# Patient Record
Sex: FEMALE | Race: WHITE | Employment: UNEMPLOYED | URBAN - METROPOLITAN AREA
[De-identification: names, ages, dates, MRNs, and addresses within clinical notes are randomized per-mention and may not be internally consistent; named-entity substitution may affect disease eponyms.]

---

## 2017-01-30 ENCOUNTER — HOSPITAL ENCOUNTER (OUTPATIENT)
Dept: LAB | Age: 62
Discharge: HOME | End: 2017-01-30

## 2017-01-30 PROCEDURE — 87624 HPV HI-RISK TYP POOLED RSLT: CPT | Performed by: OBSTETRICS & GYNECOLOGY

## 2017-01-30 PROCEDURE — 88175 CYTOPATH C/V AUTO FLUID REDO: CPT | Performed by: OBSTETRICS & GYNECOLOGY

## 2017-02-15 ENCOUNTER — HOSPITAL ENCOUNTER (OUTPATIENT)
Dept: LAB | Age: 62
Discharge: HOME | End: 2017-02-15

## 2017-02-15 LAB
ANION GAP BLD CALC-SCNC: 9 MMOL/L (ref 5–15)
BUN SERPL-MCNC: 19 MG/DL (ref 6–20)
BUN/CREAT SERPL: 28 (ref 12–20)
CALCIUM SERPL-MCNC: 10 MG/DL (ref 8.5–10.1)
CHLORIDE SERPL-SCNC: 102 MMOL/L (ref 97–108)
CO2 SERPL-SCNC: 29 MMOL/L (ref 21–32)
CREAT SERPL-MCNC: 0.69 MG/DL (ref 0.55–1.02)
GLUCOSE SERPL-MCNC: 90 MG/DL (ref 65–100)
POTASSIUM SERPL-SCNC: 4.5 MMOL/L (ref 3.5–5.1)
SODIUM SERPL-SCNC: 140 MMOL/L (ref 136–145)

## 2017-02-15 PROCEDURE — 80048 BASIC METABOLIC PNL TOTAL CA: CPT | Performed by: INTERNAL MEDICINE

## 2017-03-06 ENCOUNTER — HOSPITAL ENCOUNTER (OUTPATIENT)
Dept: LAB | Age: 62
Discharge: HOME | End: 2017-03-06

## 2017-03-06 LAB — TSH SERPL DL<=0.05 MIU/L-ACNC: 4.12 UIU/ML (ref 0.36–3.74)

## 2017-03-06 PROCEDURE — 84443 ASSAY THYROID STIM HORMONE: CPT | Performed by: INTERNAL MEDICINE

## 2017-03-06 PROCEDURE — 87493 C DIFF AMPLIFIED PROBE: CPT | Performed by: INTERNAL MEDICINE

## 2017-03-07 LAB — C DIFF TOX GENS STL QL NAA+PROBE: NEGATIVE

## 2017-11-06 DIAGNOSIS — F43.10 PTSD (POST-TRAUMATIC STRESS DISORDER): ICD-10-CM

## 2017-11-08 RX ORDER — BUPROPION HYDROCHLORIDE 150 MG/1
TABLET, EXTENDED RELEASE ORAL
Qty: 60 TAB | Refills: 3 | Status: SHIPPED | OUTPATIENT
Start: 2017-11-08 | End: 2021-08-02 | Stop reason: SDUPTHER

## 2017-11-08 RX ORDER — NICOTINE 11MG/24HR
PATCH, TRANSDERMAL 24 HOURS TRANSDERMAL
Qty: 60 CAP | Refills: 2 | Status: SHIPPED | OUTPATIENT
Start: 2017-11-08 | End: 2019-12-05 | Stop reason: ALTCHOICE

## 2017-12-20 DIAGNOSIS — G43.711 INTRACTABLE CHRONIC MIGRAINE WITHOUT AURA AND WITH STATUS MIGRAINOSUS: ICD-10-CM

## 2017-12-20 RX ORDER — TOPIRAMATE 100 MG/1
TABLET, FILM COATED ORAL
Qty: 60 TAB | Refills: 3 | Status: SHIPPED | OUTPATIENT
Start: 2017-12-20 | End: 2021-08-02 | Stop reason: SDUPTHER

## 2018-01-31 ENCOUNTER — HOSPITAL ENCOUNTER (OUTPATIENT)
Dept: LAB | Age: 63
Discharge: HOME | End: 2018-01-31

## 2018-01-31 PROCEDURE — 84443 ASSAY THYROID STIM HORMONE: CPT | Performed by: NURSE PRACTITIONER

## 2018-02-01 LAB — TSH SERPL DL<=0.05 MIU/L-ACNC: 2.37 UIU/ML (ref 0.36–3.74)

## 2019-06-19 ENCOUNTER — TELEPHONE (OUTPATIENT)
Dept: NEUROLOGY | Age: 64
End: 2019-06-19

## 2019-06-19 NOTE — TELEPHONE ENCOUNTER
----- Message from Meena Ulrich sent at 6/19/2019  3:24 PM EDT -----  Regarding: Dr Kraft/Telephone  Pt last saw Dr Dwain Cruz on 1214/16, pt is in pain with migraines and needs to be seen by a different provider.        Beset contact # [phone]

## 2019-06-21 ENCOUNTER — TELEPHONE (OUTPATIENT)
Dept: NEUROLOGY | Age: 64
End: 2019-06-21

## 2019-06-24 ENCOUNTER — OFFICE VISIT (OUTPATIENT)
Dept: PRIMARY CARE CLINIC | Age: 64
End: 2019-06-24

## 2019-06-24 VITALS
DIASTOLIC BLOOD PRESSURE: 63 MMHG | OXYGEN SATURATION: 97 % | HEART RATE: 68 BPM | RESPIRATION RATE: 17 BRPM | WEIGHT: 170.4 LBS | HEIGHT: 68 IN | TEMPERATURE: 98.4 F | SYSTOLIC BLOOD PRESSURE: 100 MMHG | BODY MASS INDEX: 25.82 KG/M2

## 2019-06-24 DIAGNOSIS — Z85.3 HISTORY OF RIGHT BREAST CANCER: ICD-10-CM

## 2019-06-24 DIAGNOSIS — Z87.820 HISTORY OF MULTIPLE CONCUSSIONS: ICD-10-CM

## 2019-06-24 DIAGNOSIS — F43.10 PTSD (POST-TRAUMATIC STRESS DISORDER): ICD-10-CM

## 2019-06-24 DIAGNOSIS — F90.9 ATTENTION DEFICIT HYPERACTIVITY DISORDER (ADHD), UNSPECIFIED ADHD TYPE: ICD-10-CM

## 2019-06-24 DIAGNOSIS — L91.8 MULTIPLE ACQUIRED SKIN TAGS: ICD-10-CM

## 2019-06-24 DIAGNOSIS — R01.1 HEART MURMUR: ICD-10-CM

## 2019-06-24 DIAGNOSIS — M79.5 FOREIGN BODY (FB) IN SOFT TISSUE: ICD-10-CM

## 2019-06-24 DIAGNOSIS — Z00.00 ANNUAL PHYSICAL EXAM: Primary | ICD-10-CM

## 2019-06-24 RX ORDER — TRAZODONE HYDROCHLORIDE 300 MG/1
300 TABLET ORAL
COMMUNITY

## 2019-06-24 RX ORDER — ARIPIPRAZOLE 5 MG/1
5 TABLET ORAL
COMMUNITY
End: 2019-07-23 | Stop reason: SDUPTHER

## 2019-06-24 RX ORDER — VILAZODONE HYDROCHLORIDE 40 MG/1
TABLET ORAL DAILY
COMMUNITY
End: 2021-04-12 | Stop reason: ALTCHOICE

## 2019-06-24 RX ORDER — LEVOTHYROXINE SODIUM 125 UG/1
TABLET ORAL
COMMUNITY
End: 2019-07-23 | Stop reason: SDUPTHER

## 2019-06-25 LAB
ALBUMIN SERPL-MCNC: 4.8 G/DL (ref 3.6–4.8)
ALBUMIN/GLOB SERPL: 2.2 {RATIO} (ref 1.2–2.2)
ALP SERPL-CCNC: 71 IU/L (ref 39–117)
ALT SERPL-CCNC: 12 IU/L (ref 0–32)
AST SERPL-CCNC: 16 IU/L (ref 0–40)
BASOPHILS # BLD AUTO: 0 X10E3/UL (ref 0–0.2)
BASOPHILS NFR BLD AUTO: 1 %
BILIRUB SERPL-MCNC: 0.3 MG/DL (ref 0–1.2)
BUN SERPL-MCNC: 18 MG/DL (ref 8–27)
BUN/CREAT SERPL: 21 (ref 12–28)
CALCIUM SERPL-MCNC: 9.8 MG/DL (ref 8.7–10.3)
CHLORIDE SERPL-SCNC: 107 MMOL/L (ref 96–106)
CHOLEST SERPL-MCNC: 180 MG/DL (ref 100–199)
CO2 SERPL-SCNC: 21 MMOL/L (ref 20–29)
CREAT SERPL-MCNC: 0.86 MG/DL (ref 0.57–1)
EOSINOPHIL # BLD AUTO: 0.1 X10E3/UL (ref 0–0.4)
EOSINOPHIL NFR BLD AUTO: 3 %
ERYTHROCYTE [DISTWIDTH] IN BLOOD BY AUTOMATED COUNT: 12.7 % (ref 12.3–15.4)
GLOBULIN SER CALC-MCNC: 2.2 G/DL (ref 1.5–4.5)
GLUCOSE SERPL-MCNC: 96 MG/DL (ref 65–99)
HCT VFR BLD AUTO: 37.4 % (ref 34–46.6)
HDLC SERPL-MCNC: 60 MG/DL
HGB BLD-MCNC: 12.8 G/DL (ref 11.1–15.9)
IMM GRANULOCYTES # BLD AUTO: 0 X10E3/UL (ref 0–0.1)
IMM GRANULOCYTES NFR BLD AUTO: 0 %
LDLC SERPL CALC-MCNC: 93 MG/DL (ref 0–99)
LYMPHOCYTES # BLD AUTO: 2.2 X10E3/UL (ref 0.7–3.1)
LYMPHOCYTES NFR BLD AUTO: 44 %
MCH RBC QN AUTO: 29.7 PG (ref 26.6–33)
MCHC RBC AUTO-ENTMCNC: 34.2 G/DL (ref 31.5–35.7)
MCV RBC AUTO: 87 FL (ref 79–97)
MONOCYTES # BLD AUTO: 0.4 X10E3/UL (ref 0.1–0.9)
MONOCYTES NFR BLD AUTO: 9 %
NEUTROPHILS # BLD AUTO: 2.1 X10E3/UL (ref 1.4–7)
NEUTROPHILS NFR BLD AUTO: 43 %
PLATELET # BLD AUTO: 229 X10E3/UL (ref 150–450)
POTASSIUM SERPL-SCNC: 4.2 MMOL/L (ref 3.5–5.2)
PROT SERPL-MCNC: 7 G/DL (ref 6–8.5)
RBC # BLD AUTO: 4.31 X10E6/UL (ref 3.77–5.28)
SODIUM SERPL-SCNC: 142 MMOL/L (ref 134–144)
TRIGL SERPL-MCNC: 135 MG/DL (ref 0–149)
TSH SERPL DL<=0.005 MIU/L-ACNC: 1.23 UIU/ML (ref 0.45–4.5)
VLDLC SERPL CALC-MCNC: 27 MG/DL (ref 5–40)
WBC # BLD AUTO: 4.8 X10E3/UL (ref 3.4–10.8)

## 2019-06-27 NOTE — PROGRESS NOTES
Divya Brannon is a 62 y.o.  female and presents with     Chief Complaint   Patient presents with   [address] Order     needs an MRI for CTR r/t concussions    Referral Request     dermatologist for skin tags    Behavioral Problem    Knee Pain    Breast Cancer     Patient is here to establish care. She has been looking for a primary care physician for for the past 6 months. She has a history of ADHD and other behavioral disorders for which she sees a psychiatrist.  She has a history of right breast cancer and she had a lumpectomy and is currently in remission. She has skin tags on her body and wants to see a dermatologist.  She reports that there is a piece of glass underneath the skin over her right knee and wants to know if could be removed. She has a history of concussions in the past.  He had different kind of injuries to her head and to her body in the past.    Past Medical History:   Diagnosis Date    Anxiety disorder     Depression     Headache      History reviewed. No pertinent surgical history. Current Outpatient Medications   Medication Sig    traZODone (DESYREL) 300 mg tablet Take 300 mg by mouth nightly.  ARIPiprazole (ABILIFY) 5 mg tablet Take 5 mg by mouth nightly.  vilazodone (VIIBRYD) 40 mg tab tablet Take  by mouth daily.  levothyroxine (SYNTHROID) 125 mcg tablet Take  by mouth Daily (before breakfast).  aspirin-acetaminophen-caffeine (EXCEDRIN ES) 250-250-65 mg per tablet Take 1 Tab by mouth.  topiramate (TOPAMAX) 100 mg tablet TAKE ONE TABLET BY MOUTH TWICE DAILY    VITAMIN D3 2,000 unit cap capsule TAKE ONE CAPSULE BY MOUTH TWICE DAILY    buPROPion SR (WELLBUTRIN SR) 150 mg SR tablet TAKE ONE TABLET BY MOUTH TWICE DAILY    diazePAM (VALIUM) 10 mg tablet Take 10 mg by mouth every six (6) hours as needed for Anxiety.  gabapentin (NEURONTIN) 300 mg capsule Take 300 mg by mouth three (3) times daily.      No current facility-administered medications for this visit. Health Maintenance   Topic Date Due    BREAST CANCER SCRN MAMMOGRAM  05/04/1974    DTaP/Tdap/Td series (1 - Tdap) 05/04/1977    Shingrix Vaccine Age 50> (1 of 2) 05/04/2006    FOBT Q 1 YEAR AGE 50-75  05/04/2006    Influenza Age 9 to Adult  08/01/2019    PAP AKA CERVICAL CYTOLOGY  01/30/2020    Hepatitis C Screening  Completed    Pneumococcal 0-64 years  Aged Out       There is no immunization history on file for this patient. No LMP recorded. Patient is postmenopausal.        Allergies and Intolerances: Allergies   Allergen Reactions    Motrin [Ibuprofen] Unknown (comments)     ulcers       Family History:   No family history on file. Social History:   She  reports that she has never smoked. She has never used smokeless tobacco.  She  reports that she does not drink alcohol.             Review of Systems:   General: negative for - chills, fatigue, fever, weight change  Psych: negative for - anxiety, depression, irritability or mood swings  ENT: negative for - headaches, hearing change, nasal congestion, oral lesions, sneezing or sore throat  Heme/ Lymph: negative for - bleeding problems, bruising, pallor or swollen lymph nodes  Endo: negative for - hot flashes, polydipsia/polyuria or temperature intolerance  Resp: negative for - cough, shortness of breath or wheezing  CV: negative for - chest pain, edema or palpitations  GI: negative for - abdominal pain, change in bowel habits, constipation, diarrhea or nausea/vomiting  : negative for - dysuria, hematuria, incontinence, pelvic pain or vulvar/vaginal symptoms  MSK: negative for - joint pain, joint swelling or muscle pain  Neuro: negative for - confusion, headaches, seizures or weakness  Derm: negative for - dry skin, hair changes, rash or skin lesion changes          Physical:   Vitals:   Vitals:    06/24/19 1420   BP: 100/63   Pulse: 68   Resp: 17   Temp: 98.4 °F (36.9 °C)   TempSrc: Oral   SpO2: 97%   Weight: 170 lb 6.4 oz (77.3 kg)   Height: 5' 8\" (1.727 m)           Exam:   HEENT- atraumatic,normocephalic, awake, oriented, well nourished  Neck - supple,no enlarged lymph nodes, no JVD, no thyromegaly  Chest- CTA, no rhonchi, no crackles  Heart- rrr, murmur heard at the left apex. Abdomen- soft,BS+,NT, no hepatosplenomegaly  Ext - no c/c/edema   Neuro- no focal deficits. Power 5/5 all extremities  Skin - warm,dry, no obvious rashes. Review of Data:   LABS:   Lab Results   Component Value Date/Time    WBC 4.8 06/24/2019 03:32 PM    HGB 12.8 06/24/2019 03:32 PM    HCT 37.4 06/24/2019 03:32 PM    PLATELET 612 04/66/6703 03:32 PM     Lab Results   Component Value Date/Time    Sodium 142 06/24/2019 03:32 PM    Potassium 4.2 06/24/2019 03:32 PM    Chloride 107 (H) 06/24/2019 03:32 PM    CO2 21 06/24/2019 03:32 PM    Glucose 96 06/24/2019 03:32 PM    BUN 18 06/24/2019 03:32 PM    Creatinine 0.86 06/24/2019 03:32 PM     Lab Results   Component Value Date/Time    Cholesterol, total 180 06/24/2019 03:32 PM    HDL Cholesterol 60 06/24/2019 03:32 PM    LDL, calculated 93 06/24/2019 03:32 PM    Triglyceride 135 06/24/2019 03:32 PM     No results found for: GPT        Impression / Plan:        ICD-10-CM ICD-9-CM    1. Annual physical exam Z00.00 V70.0 CBC WITH AUTOMATED DIFF      METABOLIC PANEL, COMPREHENSIVE      LIPID PANEL      TSH 3RD GENERATION      AMB POC EKG ROUTINE W/ 12 LEADS, INTER & REP   2. History of right breast cancer Z85.3 V10.3 FILEMON 3D VALERIO W MAMMO BI SCREENING INCL CAD   3. Attention deficit hyperactivity disorder (ADHD), unspecified ADHD type F90.9 314.01    4. PTSD (post-traumatic stress disorder) F43.10 309.81    5. Foreign body (FB) in soft tissue M79.5 729.6 XR KNEE RT MAX 2 VWS   6. Multiple acquired skin tags L91.8 701.9    7. History of multiple concussions Z87.820 V15.52    8.  Heart murmur R01.1 785.2 AMB POC EKG ROUTINE W/ 12 LEADS, INTER & REP      ECHO ADULT COMPLETE     EKG - non specific changes. Explained to patient risk benefits of the medications. Advised patient to stop meds if having any side effects. Pt verbalized understanding of the instructions. I have discussed the diagnosis with the patient and the intended plan as seen in the above orders. The patient has received an after-visit summary and questions were answered concerning future plans. I have discussed medication side effects and warnings with the patient as well. I have reviewed the plan of care with the patient, accepted their input and they are in agreement with the treatment goals. Reviewed plan of care. Patient has provided input and agrees with goals. Follow-up and Dispositions    · Return in about 3 months (around 9/24/2019).          Katherine Frank MD

## 2019-07-02 ENCOUNTER — HOSPITAL ENCOUNTER (OUTPATIENT)
Dept: MAMMOGRAPHY | Age: 64
Discharge: HOME | End: 2019-07-02
Attending: INTERNAL MEDICINE
Payer: MEDICAID

## 2019-07-02 ENCOUNTER — HOSPITAL ENCOUNTER (OUTPATIENT)
Dept: GENERAL RADIOLOGY | Age: 64
Discharge: HOME | End: 2019-07-02
Attending: INTERNAL MEDICINE
Payer: MEDICAID

## 2019-07-02 DIAGNOSIS — M79.5 FOREIGN BODY (FB) IN SOFT TISSUE: ICD-10-CM

## 2019-07-02 DIAGNOSIS — Z85.3 HISTORY OF RIGHT BREAST CANCER: ICD-10-CM

## 2019-07-02 PROCEDURE — 73562 X-RAY EXAM OF KNEE 3: CPT

## 2019-07-02 PROCEDURE — 77063 BREAST TOMOSYNTHESIS BI: CPT

## 2019-07-05 ENCOUNTER — HOSPITAL ENCOUNTER (OUTPATIENT)
Dept: NON INVASIVE DIAGNOSTICS | Age: 64
Discharge: HOME | End: 2019-07-05
Attending: INTERNAL MEDICINE
Payer: MEDICAID

## 2019-07-05 DIAGNOSIS — R01.1 HEART MURMUR: ICD-10-CM

## 2019-07-05 LAB
ECHO AO ROOT DIAM: 3.24 CM
ECHO AV AREA PEAK VELOCITY: 1.9 CM2
ECHO AV AREA/BSA PEAK VELOCITY: 1 CM2/M2
ECHO AV PEAK GRADIENT: 9.2 MMHG
ECHO AV PEAK VELOCITY: 151.35 CM/S
ECHO EST RA PRESSURE: 10 MMHG
ECHO LA VOL 4C: 47.32 ML (ref 22–52)
ECHO LV INTERNAL DIMENSION DIASTOLIC: 4.16 CM (ref 3.9–5.3)
ECHO LV INTERNAL DIMENSION SYSTOLIC: 3.48 CM
ECHO LV IVSD: 1.27 CM (ref 0.6–0.9)
ECHO LV MASS 2D: 205 G (ref 67–162)
ECHO LV POSTERIOR WALL DIASTOLIC: 1.13 CM (ref 0.6–0.9)
ECHO LV POSTERIOR WALL SYSTOLIC: 0.98 CM
ECHO LVOT DIAM: 1.81 CM
ECHO LVOT PEAK GRADIENT: 5.1 MMHG
ECHO LVOT PEAK VELOCITY: 113.45 CM/S
ECHO LVOT SV: 69.1 ML
ECHO LVOT VTI: 26.77 CM
ECHO MV A VELOCITY: 73.43 CM/S
ECHO MV AREA PHT: 4.8 CM2
ECHO MV AREA VTI: 2.2 CM2
ECHO MV E DECELERATION TIME (DT): 159.4 MS
ECHO MV E VELOCITY: 102.47 CM/S
ECHO MV E/A RATIO: 1.4
ECHO MV MAX VELOCITY: 109.09 CM/S
ECHO MV MEAN GRADIENT: 1.8 MMHG
ECHO MV PEAK GRADIENT: 4.8 MMHG
ECHO MV PRESSURE HALF TIME (PHT): 46.2 MS
ECHO MV REGURGITANT PEAK GRADIENT: 5.2 MMHG
ECHO MV REGURGITANT PEAK VELOCITY: 114.46 CM/S
ECHO MV VTI: 30.95 CM
ECHO PULMONARY ARTERY SYSTOLIC PRESSURE (PASP): 37 MMHG
ECHO RIGHT VENTRICULAR SYSTOLIC PRESSURE (RVSP): 37 MMHG
ECHO TV MAX VELOCITY: 209.21 CM/S
ECHO TV PEAK GRADIENT: 17.5 MMHG
ECHO TV REGURGITANT MAX VELOCITY: 259.63 CM/S
ECHO TV REGURGITANT PEAK GRADIENT: 27 MMHG

## 2019-07-05 PROCEDURE — 93306 TTE W/DOPPLER COMPLETE: CPT

## 2019-07-23 ENCOUNTER — OFFICE VISIT (OUTPATIENT)
Dept: NEUROLOGY | Age: 64
End: 2019-07-23

## 2019-07-23 ENCOUNTER — DOCUMENTATION ONLY (OUTPATIENT)
Dept: NEUROLOGY | Age: 64
End: 2019-07-23

## 2019-07-23 VITALS
SYSTOLIC BLOOD PRESSURE: 110 MMHG | HEART RATE: 61 BPM | BODY MASS INDEX: 25.01 KG/M2 | OXYGEN SATURATION: 99 % | HEIGHT: 68 IN | WEIGHT: 165 LBS | DIASTOLIC BLOOD PRESSURE: 62 MMHG

## 2019-07-23 DIAGNOSIS — G43.711 INTRACTABLE CHRONIC MIGRAINE WITHOUT AURA AND WITH STATUS MIGRAINOSUS: Primary | ICD-10-CM

## 2019-07-23 DIAGNOSIS — F31.75 BIPOLAR I DISORDER, IN PARTIAL REMISSION (HCC): ICD-10-CM

## 2019-07-23 DIAGNOSIS — E03.9 ACQUIRED HYPOTHYROIDISM: ICD-10-CM

## 2019-07-23 RX ORDER — ARIPIPRAZOLE 5 MG/1
5 TABLET ORAL
Qty: 90 TAB | Refills: 3 | Status: SHIPPED | OUTPATIENT
Start: 2019-07-23 | End: 2019-12-05 | Stop reason: ALTCHOICE

## 2019-07-23 RX ORDER — LEVOTHYROXINE SODIUM 125 UG/1
125 TABLET ORAL
Qty: 90 TAB | Refills: 3 | Status: SHIPPED | OUTPATIENT
Start: 2019-07-23 | End: 2021-03-30 | Stop reason: ALTCHOICE

## 2019-07-23 RX ORDER — GABAPENTIN 300 MG/1
300 CAPSULE ORAL 2 TIMES DAILY
Qty: 180 CAP | Refills: 3 | Status: SHIPPED | OUTPATIENT
Start: 2019-07-23 | End: 2019-12-05 | Stop reason: ALTCHOICE

## 2019-07-23 NOTE — PROGRESS NOTES
Name: Lynden Phalen    Chief Complaint:     Returns for a follow up visit. She has been having migraines for over 20 years. Migraines occupy 17-20 days a month. They last between 4 to 12 hours. They are associated with throbbing, nausea, and photophobia. Headaches are usually on the right side but may also be holocephalic. She has tried the following medications without success. Topamx currently taking  Proranolol  And she is currently on viibryd which precludes her from taking any other antidepressant  For abortives she has tried and failed  Maxalt, imitrex and amerge     Assesment and Plan  1. Migraines  Topamx currently taking  Proranolol  And she is currently on viibryd which precludes her from taking any other antidepressant  For abortives she has tried and failed  Maxalt, imitrex and amerge   Restart botox. 2.  Hypothyroidsm  Continue synthroid    3. Bipolar disorder   Continue welbutryn and viibryd     Allergies  Motrin [ibuprofen]     Medications  Current Outpatient Medications   Medication Sig    traZODone (DESYREL) 300 mg tablet Take 300 mg by mouth nightly.  vilazodone (VIIBRYD) 40 mg tab tablet Take  by mouth daily.  levothyroxine (SYNTHROID) 125 mcg tablet Take  by mouth Daily (before breakfast).  aspirin-acetaminophen-caffeine (EXCEDRIN ES) 250-250-65 mg per tablet Take 1 Tab by mouth.  topiramate (TOPAMAX) 100 mg tablet TAKE ONE TABLET BY MOUTH TWICE DAILY    VITAMIN D3 2,000 unit cap capsule TAKE ONE CAPSULE BY MOUTH TWICE DAILY    buPROPion SR (WELLBUTRIN SR) 150 mg SR tablet TAKE ONE TABLET BY MOUTH TWICE DAILY    diazePAM (VALIUM) 10 mg tablet Take 10 mg by mouth every six (6) hours as needed for Anxiety.  gabapentin (NEURONTIN) 300 mg capsule Take 300 mg by mouth three (3) times daily.  ARIPiprazole (ABILIFY) 5 mg tablet Take 5 mg by mouth nightly. No current facility-administered medications for this visit.          Medical History  Past Medical History: Diagnosis Date    Anxiety disorder     Breast cancer ([facility] [address]     right lumpectomy    Depression     Headache     Radiation therapy complication     late 3386 or early 2012 for radiation     Review of Systems   Constitutional: Negative for chills and fever. HENT: Negative for ear pain. Eyes: Negative for pain and discharge. Respiratory: Negative for cough and hemoptysis. Cardiovascular: Negative for chest pain and claudication. Gastrointestinal: Negative for constipation and diarrhea. Genitourinary: Negative for flank pain and hematuria. Musculoskeletal: Negative for back pain and myalgias. Skin: Negative for itching and rash. Neurological: Positive for headaches. Endo/Heme/Allergies: Negative for environmental allergies. Does not bruise/bleed easily. Psychiatric/Behavioral: Positive for depression. Negative for hallucinations. The patient is nervous/anxious and has insomnia. Exam:  Visit Vitals  /62   Pulse 61   Ht 5' 8\" (1.727 m)   Wt 165 lb (74.8 kg)   SpO2 99%   BMI 25.09 kg/m²      General: Well developed, well nourished. Patient in no apparent distress   Head: Normocephalic, atraumatic, anicteric sclera   Neck Normal ROM, No thyromegally   Lungs:  Clear to auscultation bilaterally, No wheezes or rubs   Cardiac: Regular rate and rhythm with no murmurs. Abd: Bowel sounds were audible. No tenderness on palpation   Ext: No pedal edema   Skin: Supple no rash     NeurologicExam:  Mental Status: Alert and oriented to person place and time   Speech: Fluent no aphasia or dysarthria. Cranial Nerves:  II - XII Intact   Motor:  Full and symmetric strength of upper and lower proximal and distal muscles. Normal bulk and tone. Reflexes:   Deep tendon reflexes 2+/4 and symmetric. Sensory:   Symmetric and intact with no perceived deficits modalities involving small or large fibers. Gait:  Gait is balanced and fluid with normal arm swing. Tremor:   No tremor noted. Cerebellar:  Coordination intact. Neurovascular: No carotid bruits.  No JVD           Lab Review  Lab Results   Component Value Date/Time    WBC 4.8 06/24/2019 03:32 PM    HCT 37.4 06/24/2019 03:32 PM    HGB 12.8 06/24/2019 03:32 PM    PLATELET 588 37/46/0362 03:32 PM       Lab Results   Component Value Date/Time    Sodium 142 06/24/2019 03:32 PM    Potassium 4.2 06/24/2019 03:32 PM    Chloride 107 (H) 06/24/2019 03:32 PM    CO2 21 06/24/2019 03:32 PM    Glucose 96 06/24/2019 03:32 PM    BUN 18 06/24/2019 03:32 PM    Creatinine 0.86 06/24/2019 03:32 PM    Calcium 9.8 06/24/2019 03:32 PM           Lab Results   Component Value Date/Time    Cholesterol, total 180 06/24/2019 03:32 PM    HDL Cholesterol 60 06/24/2019 03:32 PM    LDL, calculated 93 06/24/2019 03:32 PM    VLDL, calculated 27 06/24/2019 03:32 PM    Triglyceride 135 06/24/2019 03:32 PM    CHOL/HDL Ratio 2.5 12/27/2016 09:09 AM

## 2019-07-23 NOTE — PATIENT INSTRUCTIONS
A Healthy Lifestyle: Care Instructions  Your Care Instructions    A healthy lifestyle can help you feel good, stay at a healthy weight, and have plenty of energy for both work and play. A healthy lifestyle is something you can share with your whole family. A healthy lifestyle also can lower your risk for serious health problems, such as high blood pressure, heart disease, and diabetes. You can follow a few steps listed below to improve your health and the health of your family. Follow-up care is a key part of your treatment and safety. Be sure to make and go to all appointments, and call your doctor if you are having problems. It's also a good idea to know your test results and keep a list of the medicines you take. How can you care for yourself at home? · Do not eat too much sugar, fat, or fast foods. You can still have dessert and treats now and then. The goal is moderation. · Start small to improve your eating habits. Pay attention to portion sizes, drink less juice and soda pop, and eat more fruits and vegetables. ? Eat a healthy amount of food. A 3-ounce serving of meat, for example, is about the size of a deck of cards. Fill the rest of your plate with vegetables and whole grains. ? Limit the amount of soda and sports drinks you have every day. Drink more water when you are thirsty. ? Eat at least 5 servings of fruits and vegetables every day. It may seem like a lot, but it is not hard to reach this goal. A serving or helping is 1 piece of fruit, 1 cup of vegetables, or 2 cups of leafy, raw vegetables. Have an apple or some carrot sticks as an afternoon snack instead of a candy bar. Try to have fruits and/or vegetables at every meal.  · Make exercise part of your daily routine. You may want to start with simple activities, such as walking, bicycling, or slow swimming. Try to be active 30 to 60 minutes every day. You do not need to do all 30 to 60 minutes all at once.  For example, you can exercise 3 times a day for 10 or 20 minutes. Moderate exercise is safe for most people, but it is always a good idea to talk to your doctor before starting an exercise program.  · Keep moving. Abad Andujar the lawn, work in the garden, or [employer]. Take the stairs instead of the elevator at work. · If you smoke, quit. People who smoke have an increased risk for heart attack, stroke, cancer, and other lung illnesses. Quitting is hard, but there are ways to boost your chance of quitting tobacco for good. ? Use nicotine gum, patches, or lozenges. ? Ask your doctor about stop-smoking programs and medicines. ? Keep trying. In addition to reducing your risk of diseases in the future, you will notice some benefits soon after you stop using tobacco. If you have shortness of breath or asthma symptoms, they will likely get better within a few weeks after you quit. · Limit how much alcohol you drink. Moderate amounts of alcohol (up to 2 drinks a day for men, 1 drink a day for women) are okay. But drinking too much can lead to liver problems, high blood pressure, and other health problems. Family health  If you have a family, there are many things you can do together to improve your health. · Eat meals together as a family as often as possible. · Eat healthy foods. This includes fruits, vegetables, lean meats and dairy, and whole grains. · Include your family in your fitness plan. Most people think of activities such as jogging or tennis as the way to fitness, but there are many ways you and your family can be more active. Anything that makes you breathe hard and gets your heart pumping is exercise. Here are some tips:  ? Walk to do errands or to take your child to school or the bus.  ? Go for a family bike ride after dinner instead of watching TV. Where can you learn more? Go to http://gay-ana.info/. Enter M135 in the search box to learn more about \"A Healthy Lifestyle: Care Instructions. \"  Current as of: September 11, 2018  Content Version: 12.1  © 7437-0799 [employer]. Care instructions adapted under license by [employer] (which disclaims liability or warranty for this information). If you have questions about a medical condition or this instruction, always ask your healthcare professional. [address] any warranty or liability for your use of this information.

## 2019-07-24 ENCOUNTER — DOCUMENTATION ONLY (OUTPATIENT)
Dept: NEUROLOGY | Age: 64
End: 2019-07-24

## 2019-07-24 NOTE — PROGRESS NOTES
Patient left the after visit summary with the appointment information as well as the script for Gabapentin, called patient and informed of this information. Patient requested for the script to be faxed to the pharmacy on file which is [facility] on [address] as well as mailed the patient the appointment reminder for the next appointment.

## 2019-07-26 ENCOUNTER — TELEPHONE (OUTPATIENT)
Dept: NEUROLOGY | Age: 64
End: 2019-07-26

## 2019-07-30 ENCOUNTER — TELEPHONE (OUTPATIENT)
Dept: NEUROLOGY | Age: 64
End: 2019-07-30

## 2019-07-30 NOTE — TELEPHONE ENCOUNTER
----- Message from Ryan St sent at 7/30/2019  3:47 PM EDT -----  Regarding: Dr. Shanna Alcaraz / telephone  Contact: [phone]  General Message/Vendor Calls    Caller's first and last name: [place]      Reason for call:scheduling an MRI       Callback required yes/no and why:yes      Best contact number(s):[phone]      Details to clarify the request:Pt requested a return call regarding MRI appt      Ryan St

## 2019-08-01 ENCOUNTER — TELEPHONE (OUTPATIENT)
Dept: NEUROLOGY | Age: 64
End: 2019-08-01

## 2019-08-02 ENCOUNTER — TELEPHONE (OUTPATIENT)
Dept: NEUROLOGY | Age: 64
End: 2019-08-02

## 2019-08-02 NOTE — TELEPHONE ENCOUNTER
Patient stated she has had so many concussions the court and her  needs something to show that she has had some imaging done. Patient stated she would like to have a CT ordered. .    Also advised the abilify has been sent to the pharmacy   ARIPiprazole (ABILIFY) 5 mg tablet 90 Tab 3 7/23/2019     Sig - Route: Take 1 Tab by mouth nightly. - Oral    Sent to pharmacy as: ARIPiprazole (ABILIFY) 5 mg tablet    E-Prescribing Status: Receipt confirmed by pharmacy (7/23/2019  2:07 PM EDT)      Advised to the patient if nothing has been received to let the office know and it will be resent.

## 2019-08-14 ENCOUNTER — TELEPHONE (OUTPATIENT)
Dept: NEUROLOGY | Age: 64
End: 2019-08-14

## 2019-08-14 ENCOUNTER — OFFICE VISIT (OUTPATIENT)
Dept: PRIMARY CARE CLINIC | Age: 64
End: 2019-08-14

## 2019-08-14 VITALS
RESPIRATION RATE: 17 BRPM | HEIGHT: 68 IN | HEART RATE: 65 BPM | TEMPERATURE: 98.1 F | OXYGEN SATURATION: 96 % | WEIGHT: 164.6 LBS | SYSTOLIC BLOOD PRESSURE: 115 MMHG | DIASTOLIC BLOOD PRESSURE: 72 MMHG | BODY MASS INDEX: 24.95 KG/M2

## 2019-08-14 DIAGNOSIS — Z87.820 HISTORY OF MULTIPLE CONCUSSIONS: ICD-10-CM

## 2019-08-14 DIAGNOSIS — M25.519 NECK AND SHOULDER PAIN: ICD-10-CM
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
## 2019-08-14 DIAGNOSIS — M54.2 NECK AND SHOULDER PAIN: ICD-10-CM

## 2019-08-14 DIAGNOSIS — R42 DIZZINESS: ICD-10-CM

## 2019-08-14 RX ORDER — METHYLPREDNISOLONE 4 MG/1
TABLET ORAL
Qty: 1 DOSE PACK | Refills: 0 | Status: SHIPPED | OUTPATIENT
Start: 2019-08-14 | End: 2019-12-05 | Stop reason: ALTCHOICE

## 2019-08-14 NOTE — TELEPHONE ENCOUNTER
Botox  approved 8/14/19 - 2/10/2020 from [facility] via [facility]. Case [id number]. Botox 13920 sent to [facility] w/ July office notes and requested     Ref    [id number]    Tracking ID [id number]    * Please note:  I have obtained prior authorization for the  200 units  Case [id number] date range 8/14/19 - 2/10/2020 from [facility]. The procedure is scheduled for 9/26/19 - so the start date for CPT 43016 can be 9/26/19. Please add [facility] to this authorization so they can process it more efficiently. Any questions, please call me at [phone]. Thank you!

## 2019-08-14 NOTE — PROGRESS NOTES
Odessa Tate is a 62 y.o. female    Chief Complaint   Patient presents with    Fall     fell in shower on sunday, went to [address] [place] [address] ED and was dx with concussion, CT Normal    Dizziness     also feeling \"spacey\" and having trouble remembering things       1. Have you been to the ER, urgent care clinic since your last visit? Hospitalized since your last visit? See CC    2. Have you seen or consulted any other health care providers outside of the [address] [place] [address] since your last visit? Include any pap smears or colon screening. No    No flowsheet data found.      Health Maintenance Due   Topic Date Due    DTaP/Tdap/Td series (1 - Tdap) 05/04/1977    Shingrix Vaccine Age 50> (1 of 2) 05/04/2006    FOBT Q 1 YEAR AGE 50-75  05/04/2006    Influenza Age 9 to Adult  08/01/2019

## 2019-08-14 NOTE — TELEPHONE ENCOUNTER
Re: Botox P.A. (1st appt 9/26/19)    Sent  w/ 7/23/19 office note to [facility]. Once this approves, will submit [id number] to [facility].

## 2019-08-14 NOTE — PROGRESS NOTES
HPI:     Chief Complaint   Patient presents with    Fall     fell in shower on sunday, went to [address] [place] [address] ED and was dx with concussion, CT Normal    Dizziness     also feeling \"spacey\" and having trouble remembering things        Patient is a 62 y.o. female with significant history of depression, anxiety, bipolar disorder, migraine who presents for follow-up from recent fall. Patient reports 3 days ago she was taking a bath when she went to stand up and lost her balance. She hit left side of her head on the wall of the shower. Denies loss of consciousness and remembers what happened. She went to [facility] ED that day and reports CT scan was normal and was diagnosed with concussion. ED note unavailable for review during visit. Reports this is her 8th concussion. She follows with neurology. Reports that she still has headache, but improved. Continues to have neck and shoulder discomfort particularly with ROM. Denies upper extremity radiating pain, weakness, numbness, paresthesias. Also with lingering dizzy feeling at times. Denies syncope. Has increased water intake, at least 8 glasses daily. Drinks one cup of coffee daily. Denies chest pain, palpitations, dyspnea, N/V, hearing loss, tinnitus, confusion, trouble speaking, weakness, vision change, difficulty walking. Has been having trouble remembering things for past few months. Of note, patient is on several psychiatric medications including Abilify, trazodone, viibryd, valium as well as gabapentin and Topamax. She sees a psychiatrist.        There is no problem list on file for this patient. Current Outpatient Medications   Medication Sig Dispense Refill    methylPREDNISolone (MEDROL DOSEPACK) 4 mg tablet Follow package instructions. 1 Dose Pack 0    ARIPiprazole (ABILIFY) 5 mg tablet Take 1 Tab by mouth nightly. 90 Tab 3    gabapentin (NEURONTIN) 300 mg capsule Take 1 Cap by mouth two (2) times a day.  Max Daily Amount: 600 mg. 180 Cap 3    levothyroxine (SYNTHROID) 125 mcg tablet Take 1 Tab by mouth Daily (before breakfast). 90 Tab 3    onabotulinumtoxinA (BOTOX) 200 unit injection 200 units by IM route once every 12 weeks. Inject IM neck/face, 31 FDA approved sites. Indication: Migraine prevention. DX code: G43.71 1 Vial 3    traZODone (DESYREL) 300 mg tablet Take 300 mg by mouth nightly.  vilazodone (VIIBRYD) 40 mg tab tablet Take  by mouth daily.  aspirin-acetaminophen-caffeine (EXCEDRIN ES) 250-250-65 mg per tablet Take 1 Tab by mouth.  topiramate (TOPAMAX) 100 mg tablet TAKE ONE TABLET BY MOUTH TWICE DAILY 60 Tab 3    buPROPion SR (WELLBUTRIN SR) 150 mg SR tablet TAKE ONE TABLET BY MOUTH TWICE DAILY 60 Tab 3    diazePAM (VALIUM) 10 mg tablet Take 10 mg by mouth every six (6) hours as needed for Anxiety.  VITAMIN D3 2,000 unit cap capsule TAKE ONE CAPSULE BY MOUTH TWICE DAILY 60 Cap 2     Allergies   Allergen Reactions    Motrin [Ibuprofen] Unknown (comments)     ulcers     Past Medical History:   Diagnosis Date    Anxiety disorder     Breast cancer ([facility] [address]     right lumpectomy    Concussion     Depression     Headache     Radiation therapy complication     late 4329 or early 2012 for radiation          ROS:   Pertinent items are noted in HPI. Objective:     Vitals:    08/14/19 1441   BP: 115/72   Pulse: 65   Resp: 17   Temp: 98.1 °F (36.7 °C)   TempSrc: Oral   SpO2: 96%   Weight: 164 lb 9.6 oz (74.7 kg)   Height: 5' 8\" (1.727 m)        Vitals and Nurse Documentation reviewed.     Physical Examination:   General appearance - alert, well appearing, and in no distress  Mental status - alert, oriented to person, place, and time, normal mood, behavior, speech, dress, motor activity, and thought processes  Head - no obvious trauma, no bruising or hematoma, no tenderness with palpation   Eyes - pupils equal and reactive, extraocular eye movements intact  Ears - bilateral TM's and external ear canals normal  Nose - normal and patent, no erythema, discharge or polyps  Mouth - mucous membranes moist, pharynx normal without lesions  Neck - supple, no significant adenopathy  Chest - clear to auscultation, no wheezes, rales or rhonchi, symmetric air entry  Heart - normal rate, regular rhythm, normal S1, S2, no murmurs, rubs, clicks or gallops  Neurological - alert, oriented, normal speech, no focal findings or movement disorder noted, neck supple without rigidity, motor and sensory grossly normal bilaterally, normal muscle tone, no tremors, strength 5/5, finger to nose intact, Romberg sign negative, negative pronator drift, normal gait and station  Musculoskeletal - generalized tenderness of musculature of shoulder and neck. No spinal tenderness or step off. Full cervical ROM but with discomfort. No tenderness over the SC joint, clavicle, AC joint. Strength 5/5 and symmetric. Good  strength bilaterally. Extremities - peripheral pulses normal, no pedal edema, no clubbing or cyanosis      Assessment/ Plan:   Diagnoses and all orders for this visit:    1. Fall, initial encounter    2. Neck and shoulder pain  -     methylPREDNISolone (MEDROL DOSEPACK) 4 mg tablet; Follow package instructions. Medication benefits, risks, indication, dosage, potential adverse effects, and alternate medication options were discussed with patient who expressed understanding.    -     Advised ice/heat application, gentle stretching exercises and ROM. 3. History of multiple concussions    4. Dizziness    Patient presents 3 days after fall in bathtub. No loss of consciousness. Was evaluated in ED and reports CT scan was normal.  ED records unavailable during visit. She appears well today. Neurologically intact on exam.  She has history of multiple concussions.   Advised to follow-up with her neurologist.  We reviewed concussion recovery and supportive care/precautions with additional information in AVS.  Discussed that many of her medications can cause dizziness and memory issues and to discuss with her psychiatrist.  Lesia Siegelyle to drink plenty of water and avoid caffeine. We reviewed s/s that would warrant prompt follow-up or emergent evaluation in the ED. Follow-up and Dispositions    · Return if symptoms worsen or fail to improve. I have discussed the diagnosis with the patient and the intended plan as seen in the above orders. Advised prompt follow-up if symptoms worsen or fail to improve and symptoms that would warrant emergent evaluation in ED. The patient has received an after-visit summary and questions were answered concerning future plans. I have discussed medication side effects and warnings with the patient as well. Patient expressed understanding and is in agreement with the diagnosis and plan.

## 2019-08-14 NOTE — PATIENT INSTRUCTIONS
Concussion: Care Instructions  Your Care Instructions    A concussion is a kind of injury to the brain. It happens when the head receives a hard blow. The impact can jar or shake the brain against the skull. This interrupts the brain's normal activities. Although you may have cuts or bruises on your head or face, you may have no other visible signs of a brain injury. In most cases, damage to the brain from a concussion can't be seen in tests such as a CT or MRI scan. For a few weeks, you may have low energy, dizziness, trouble sleeping, a headache, ringing in your ears, or nausea. You may also feel anxious, grumpy, or depressed. You may have problems with memory and concentration. These symptoms are common after a concussion. They should slowly improve over time. Sometimes this takes weeks or even months. Someone who lives with you should know how to care for you. Please share this and all information with a caregiver who will be available to help if needed. Follow-up care is a key part of your treatment and safety. Be sure to make and go to all appointments, and call your doctor if you are having problems. It's also a good idea to know your test results and keep a list of the medicines you take. How can you care for yourself at home? Pain control  · Put ice or a cold pack on the part of your head that hurts for 10 to 20 minutes at a time. Put a thin cloth between the ice and your skin. · Be safe with medicines. Read and follow all instructions on the label. ? If the doctor gave you a prescription medicine for pain, take it as prescribed. ? If you are not taking a prescription pain medicine, ask your doctor if you can take an over-the-counter medicine. Recovery  · Follow your doctor's instructions. He or she will tell you if you need someone to watch you closely for the next 24 hours or longer. · Rest is the best way to recover from a concussion.  You need to rest your body and your brain:  ? Get plenty of sleep at night. And take rest breaks during the day. ? Avoid activities that take a lot of physical or mental work. This includes housework, exercise, schoolwork, video games, text messaging, and using the computer. ? You may need to change your school or work schedule while you recover. ? Return to your normal activities slowly. Do not try to do too much at once. · Do not drink alcohol or use illegal drugs. Alcohol and illegal drugs can slow your recovery. And they can increase your risk of a second brain injury. · Avoid activities that could lead to another concussion. Follow your doctor's instructions for a gradual return to activity and sports. · Ask your doctor when it's okay for you to drive a car, ride a bike, or operate machinery. How should you return to activity? Your return to activity can begin after 1 to 2 days of physical and mental rest. After resting, you can gradually increase your activity as long as it does not cause new symptoms or worsen your symptoms. Doctors and concussion specialists suggest steps to follow for returning to sports after a concussion. Use these steps as a guide. You should slowly progress through the following levels of activity:  1. Limited activity. You can take part in daily activities as long as the activity doesn't increase your symptoms or cause new symptoms. 2. Light aerobic activity. This can include walking, swimming, or other exercise at less than 70% of maximum heart rate. No resistance training is included in this step. 3. Sport-specific exercise. This includes running drills or skating drills (depending on the sport), but no head impact. 4. Noncontact training drills. This includes more complex training drills such as passing. The athlete may also begin light resistance training. 5. Full-contact practice. The athlete can participate in normal training. 6. Return to normal game play.  This is the final step and allows the athlete to join in normal game play. Watch and keep track of your progress. It should take at least 6 days for you to go from light activity to normal game play. Make sure that you can stay at each new level of activity for at least 24 hours without symptoms, or as long as your doctor says, before doing more. If one or more symptoms come back, return to a lower level of activity for at least 24 hours. Don't move on until all symptoms are gone. When should you call for help? Call 911 anytime you think you may need emergency care. For example, call if:    · You have a seizure.     · You passed out (lost consciousness).     · You are confused or can't stay awake.    Call your doctor now or seek immediate medical care if:    · You have new or worse vomiting.     · You feel less alert.     · You have new weakness or numbness in any part of your body.    Watch closely for changes in your health, and be sure to contact your doctor if:    · You do not get better as expected.     · You have new symptoms, such as headaches, trouble concentrating, or changes in mood. Where can you learn more? Go to http://gay-ana.info/. Enter P576 in the search box to learn more about \"Concussion: Care Instructions. \"  Current as of: March 28, 2019  Content Version: 12.1  © 9696-9476 [employer]. Care instructions adapted under license by [employer] (which disclaims liability or warranty for this information). If you have questions about a medical condition or this instruction, always ask your healthcare professional. [place] [address] any warranty or liability for your use of this information.

## 2019-08-15 ENCOUNTER — TELEPHONE (OUTPATIENT)
Dept: NEUROLOGY | Age: 64
End: 2019-08-15

## 2019-08-15 NOTE — TELEPHONE ENCOUNTER
Received a phone call from [facility]; She stated the code 65513 requires no authorization. She also stated she is going to cancel [id number]. She also stated Dr Carbajal number is [phone]. I tried to get her back to the phone for [facility] and she stated she just called and was told to speak with one on the nurses.

## 2019-08-16 NOTE — TELEPHONE ENCOUNTER
[employer] - verified they have patient in their data base from previous Botox - but Rx they have has  years ago. Verified pt cell and address on file - same. Raudel Zamora says to call  [phone] to give verbal Rx. He said they should be able to ship it  or Tuesday.

## 2019-08-19 ENCOUNTER — TELEPHONE (OUTPATIENT)
Dept: NEUROLOGY | Age: 64
End: 2019-08-19

## 2019-08-19 NOTE — TELEPHONE ENCOUNTER
----- Message from [facility] sent at 8/19/2019  9:32 AM EDT -----  Regarding: Dr Sommer Lee telephone  The pt is requesting a callback in regards to getting her MRI scheduled since no one has contacted her.        Best contact number is [phone]

## 2019-08-20 NOTE — TELEPHONE ENCOUNTER
Patient stated she has had so many concussions the court and her  needs something to show that she has had some imaging done.     Patient stated she would like to have an MRI ordered

## 2019-08-29 ENCOUNTER — TELEPHONE (OUTPATIENT)
Dept: NEUROLOGY | Age: 64
End: 2019-08-29

## 2019-08-29 NOTE — TELEPHONE ENCOUNTER
There's no reason currently to order an MRI and insurance won't approve it. She will need to get it from the hospital that she went to in [place] when she fell (or where ever it was she had her concussion).

## 2019-09-04 ENCOUNTER — TELEPHONE (OUTPATIENT)
Dept: NEUROLOGY | Age: 64
End: 2019-09-04

## 2019-09-04 NOTE — TELEPHONE ENCOUNTER
Spoke with Ray with [facility]; Zaire Harrell stated patient has an issue that has to be resolved before the Botox can be ordered.

## 2019-09-04 NOTE — TELEPHONE ENCOUNTER
Spoke with patient, advised she needs to contact [facility] to take care of an issue before the botox can be ordered. Provided the patient with the number to the pharmacy.

## 2019-09-10 ENCOUNTER — TELEPHONE (OUTPATIENT)
Dept: NEUROLOGY | Age: 64
End: 2019-09-10

## 2019-09-10 NOTE — TELEPHONE ENCOUNTER
Spoke with Kyle Landa; she stated patient has a hold on her account from billing. She would need to contact that department before any medication can be shipped out.

## 2019-09-10 NOTE — TELEPHONE ENCOUNTER
Spoke with the patient, advised will need to contact [facility] to have the billing taken care of before they will ship any Botox. Provided the patient with the number to [facility], also advised once she has taken care of the billing to call the office back so we can then order her medication    Patient understood and stated she is going to call them today.

## 2019-09-16 ENCOUNTER — TELEPHONE (OUTPATIENT)
Dept: NEUROLOGY | Age: 64
End: 2019-09-16

## 2019-09-16 NOTE — TELEPHONE ENCOUNTER
Botox - called pt - advised will need her to complete patient enrollment and medication needs to be completed and delivered by this Friday, Sept 20th.

## 2019-09-17 ENCOUNTER — TELEPHONE (OUTPATIENT)
Dept: NEUROLOGY | Age: 64
End: 2019-09-17

## 2019-09-17 NOTE — TELEPHONE ENCOUNTER
Re: Botox [id number]    Checked [employer] for documentation that CPT 92772 does not require a P. A.     Rec'd and scanned into chart.      Tracking ID [id number]    Ref [id number]

## 2019-09-19 ENCOUNTER — TELEPHONE (OUTPATIENT)
Dept: NEUROLOGY | Age: 64
End: 2019-09-19

## 2019-09-19 NOTE — TELEPHONE ENCOUNTER
Spoke with Jakob Uriarte she stated the patient still hasn't completed the new patient enrollment. Patient has already been notified that a new the enrollment needs to be completed, if not then we will have to cancel her appointment.

## 2019-09-23 ENCOUNTER — TELEPHONE (OUTPATIENT)
Dept: NEUROLOGY | Age: 64
End: 2019-09-23

## 2019-09-23 NOTE — TELEPHONE ENCOUNTER
Note      Spoke with Analy from [address] [place] [address] she stated when she runs the test claim for the patient she gets a message that says bill a different processor;     Advised will send to the  for review.

## 2019-09-23 NOTE — TELEPHONE ENCOUNTER
Spoke with Analy from [address] [place] [address] she stated when she runs the test claim for the patient she gets a message that says bill a different processor; Advised will send to the  for review.

## 2019-09-23 NOTE — TELEPHONE ENCOUNTER
----- Message from Candelaria Edwards sent at 9/20/2019  3:56 PM EDT -----  Regarding: DR Kraft/telephone  General Message/    Caller's first and last name:      Reason for call:to confirm if she can keep her botox appt      Callback required yes/no and why:yes,  Confirm if insurance is straight  her for her botox    Best contact number(s):  [phone]    Details to clarify the request:to confirm if her insurance is giving the all clear for her to keep her appt for her botox on 9/26/19, she said she only has one  insurance company and that is [employer] [address] [employer]       Candelaria Edwards

## 2019-09-23 NOTE — TELEPHONE ENCOUNTER
Spoke with the patient, advised patient the enrollment needs to be completed prior to them shipping the botox to the office. Advised to the patient if the Botox is not in the office at the 25th her appointment would have to be cancelled. Provided patient with the number to call for [facility].

## 2019-09-25 ENCOUNTER — TELEPHONE (OUTPATIENT)
Dept: NEUROLOGY | Age: 64
End: 2019-09-25

## 2019-09-25 NOTE — TELEPHONE ENCOUNTER
Patient called her insurance and they informed her that her botox would be delivered today. Her appointment is tomorrow. She wants you to call her if we don't get it so she won't come in tomorrow for nothing.

## 2019-10-04 ENCOUNTER — TELEPHONE (OUTPATIENT)
Dept: NEUROLOGY | Age: 64
End: 2019-10-04

## 2019-10-08 ENCOUNTER — DOCUMENTATION ONLY (OUTPATIENT)
Dept: NEUROLOGY | Age: 64
End: 2019-10-08

## 2019-10-08 NOTE — PROGRESS NOTES
botox came in the office: 10/08/19  MFR: allergtemo  LOT: [id number]  Exp: 12/2021  Appointment: n/a  Specialty [facility]: [facility]

## 2019-10-10 ENCOUNTER — TELEPHONE (OUTPATIENT)
Dept: NEUROLOGY | Age: 64
End: 2019-10-10

## 2019-10-16 ENCOUNTER — TELEPHONE (OUTPATIENT)
Dept: NEUROLOGY | Age: 64
End: 2019-10-16

## 2019-10-16 NOTE — TELEPHONE ENCOUNTER
Received call from patient, she stated that her Botox was received in the office on Oct 7th and wants to know why no one called to let her know.       [phone]

## 2019-10-17 NOTE — TELEPHONE ENCOUNTER
Appointment Created   Appointment Info   The following appointments have been successfully scheduled:   Date/time Friday, October 18, 2019 07:30 AM   Patient Gloria Ramos 1955 ([id number] F) #[id number]   Department [facility] OFFICE-[address]   Appointment type Follow Up   Provider CR[facility] WH[facility]     Patient has already been scheduled for an appointment.

## 2019-10-17 NOTE — TELEPHONE ENCOUNTER
Spoke with patient, advised per Dr. Kenzie Gannon he would come in early on Friday to do the patient's Botox injections. Patient stated she would be here for the injection.      Appointment Created   Appointment Info   The following appointments have been successfully scheduled:   Date/time Friday, October 18, 2019 07:30 AM   Patient Sarah Palumbo 1955 (44PY F) #[id number]   Department [facility] OFFICE-[facility] [address]   Appointment type Follow Up   Provider CROW WHITE

## 2019-10-18 ENCOUNTER — OFFICE VISIT (OUTPATIENT)
Dept: NEUROLOGY | Age: 64
End: 2019-10-18

## 2019-10-18 VITALS
HEART RATE: 64 BPM | DIASTOLIC BLOOD PRESSURE: 64 MMHG | SYSTOLIC BLOOD PRESSURE: 124 MMHG | HEIGHT: 68 IN | OXYGEN SATURATION: 97 % | BODY MASS INDEX: 25.03 KG/M2

## 2019-10-18 DIAGNOSIS — G43.711 INTRACTABLE CHRONIC MIGRAINE WITHOUT AURA AND WITH STATUS MIGRAINOSUS: Primary | ICD-10-CM

## 2019-10-18 NOTE — PROCEDURES
Procedure: Chemodenervation for Chronic Intractable Migraines    After consenting the patient and discussing the procedure and possible side effects. The chemodenervant was reconstituted as follows:  200 units of botox was mixed with 4ml of perservative free saline and withdrawn, using a into four sterile 1ml BD syringes. Sterile 30 gauge half inch needles were affixed to the syringes. Procedure   Chemodenervant was injected into the following muscles which were identified using their anatomical land marks. Each site was aspirated prior to injection to ensure that there was no vascular compromise. Muscle Units/inj No. Of injections Total   Trapezius BL 5 U 4 each 40 units   Cervical Paraspinals BL 5 U 2 each 20 units   Occipitalis BL 5 U 3 each 30 units   Temporalis BL 5 U 4 each 40 units    BL 5 U 1 each 10 units   Procerus 5 U 1 5 units   Frontalis BL 5 U 2 each  20 units         Total units  Waste 35 units 165     Nominal bleeding at injection sites. Patient tolerated the procedure well. No reported pain following the procedure.

## 2019-10-18 NOTE — PATIENT INSTRUCTIONS
A Healthy Lifestyle: Care Instructions  Your Care Instructions    A healthy lifestyle can help you feel good, stay at a healthy weight, and have plenty of energy for both work and play. A healthy lifestyle is something you can share with your whole family. A healthy lifestyle also can lower your risk for serious health problems, such as high blood pressure, heart disease, and diabetes. You can follow a few steps listed below to improve your health and the health of your family. Follow-up care is a key part of your treatment and safety. Be sure to make and go to all appointments, and call your doctor if you are having problems. It's also a good idea to know your test results and keep a list of the medicines you take. How can you care for yourself at home? · Do not eat too much sugar, fat, or fast foods. You can still have dessert and treats now and then. The goal is moderation. · Start small to improve your eating habits. Pay attention to portion sizes, drink less juice and soda pop, and eat more fruits and vegetables. ? Eat a healthy amount of food. A 3-ounce serving of meat, for example, is about the size of a deck of cards. Fill the rest of your plate with vegetables and whole grains. ? Limit the amount of soda and sports drinks you have every day. Drink more water when you are thirsty. ? Eat at least 5 servings of fruits and vegetables every day. It may seem like a lot, but it is not hard to reach this goal. A serving or helping is 1 piece of fruit, 1 cup of vegetables, or 2 cups of leafy, raw vegetables. Have an apple or some carrot sticks as an afternoon snack instead of a candy bar. Try to have fruits and/or vegetables at every meal.  · Make exercise part of your daily routine. You may want to start with simple activities, such as walking, bicycling, or slow swimming. Try to be active 30 to 60 minutes every day. You do not need to do all 30 to 60 minutes all at once.  For example, you can exercise 3 times a day for 10 or 20 minutes. Moderate exercise is safe for most people, but it is always a good idea to talk to your doctor before starting an exercise program.  · Keep moving. Sirena Glance the lawn, work in the garden, or [employer]. Take the stairs instead of the elevator at work. · If you smoke, quit. People who smoke have an increased risk for heart attack, stroke, cancer, and other lung illnesses. Quitting is hard, but there are ways to boost your chance of quitting tobacco for good. ? Use nicotine gum, patches, or lozenges. ? Ask your doctor about stop-smoking programs and medicines. ? Keep trying. In addition to reducing your risk of diseases in the future, you will notice some benefits soon after you stop using tobacco. If you have shortness of breath or asthma symptoms, they will likely get better within a few weeks after you quit. · Limit how much alcohol you drink. Moderate amounts of alcohol (up to 2 drinks a day for men, 1 drink a day for women) are okay. But drinking too much can lead to liver problems, high blood pressure, and other health problems. Family health  If you have a family, there are many things you can do together to improve your health. · Eat meals together as a family as often as possible. · Eat healthy foods. This includes fruits, vegetables, lean meats and dairy, and whole grains. · Include your family in your fitness plan. Most people think of activities such as jogging or tennis as the way to fitness, but there are many ways you and your family can be more active. Anything that makes you breathe hard and gets your heart pumping is exercise. Here are some tips:  ? Walk to do errands or to take your child to school or the bus.  ? Go for a family bike ride after dinner instead of watching TV. Where can you learn more? Go to http://gay-ana.info/. Enter N234 in the search box to learn more about \"A Healthy Lifestyle: Care Instructions. \"  Current as of: May 28, 2019  Content Version: 12.2  © 0095-9242 [employer]. Care instructions adapted under license by [employer] (which disclaims liability or warranty for this information). If you have questions about a medical condition or this instruction, always ask your healthcare professional. [address] any warranty or liability for your use of this information. OnabotulinumtoxinA (By injection)   OnabotulinumtoxinA (cv-y-nac-ma-GOQ-idl-tox-in-ay)  Treats muscle stiffness, muscle spasms, excessive sweating, overactive bladder, or loss of bladder control. Prevents chronic migraine headaches. Improves the appearance of wrinkles on the face. Brand Name(s): Botox, Botox Cosmetic   There may be other brand names for this medicine. When This Medicine Should Not Be Used: This medicine is not right for everyone. You should not receive this medicine if you had an allergic reaction to onabotulinumtoxinA or any other botulinum toxin product. How to Use This Medicine:   Injectable  · Your doctor will prescribe your exact dose and tell you how often it should be given. This medicine is given by a healthcare provider as a shot under your skin or into a muscle. · You may be given medicine to numb the area where the shot will be injected. If you receive the medicine around your eyes, you may be given eye drops or ointment to numb the area. After your injection, you may need to wear a protective contact lens or eye patch. · If you are being treated for excessive sweating, shave your underarms but do not use deodorant for 24 hours before your injection. Avoid exercise, hot foods or liquids, or anything else that could make you sweat for 30 minutes before your injection. · The recommended treatment schedule for chronic migraine is every 12 weeks. · This medicine works slowly.  Once your condition has improved, the medicine will last about 3 months, then the effects will slowly go away. You might need more injections to treat your condition. ¨ Muscle spasms in the eyelids should improve within 3 to 10 days. ¨ Eye muscle problems should improve 1 or 2 days after the injection, and the improvement should last for 2 to 6 weeks. ¨ Neck pain should improve within 2 to 6 weeks. ¨ Arm stiffness should improve within 4 to 6 weeks. ¨ Facial lines or wrinkles should improve 1 or 2 days. · This medicine should come with a Medication Guide. Ask your pharmacist for a copy if you do not have one. · Missed dose:Call your doctor or pharmacist for instructions. Drugs and Foods to Avoid:   Ask your doctor or pharmacist before using any other medicine, including over-the-counter medicines, vitamins, and herbal products. · Some foods and medicine can affect how onabotulinumtoxinA works. Tell your doctor if you are using any of the following:  ¨ Aspirin or a blood thinner (such as ticlopidine, warfarin)  ¨ Muscle relaxer  ¨ Medicine for an infection (such as amikacin, gentamicin, streptomycin, tobramycin)  · Tell your doctor if you have received an injection of any botulinum toxin product within the past 4 months. Warnings While Using This Medicine:   · Tell your doctor if you are pregnant or breastfeeding, or if you have breathing or lung problems, bleeding problems, heart or blood vessel disease, or nerve or muscle problems (such as myasthenia gravis). Tell your doctor if you have ever had face surgery or if you have a urinary tract infection or trouble urinating, diabetes, or multiple sclerosis. · This medicine may cause the following problems:  ¨ Muscle weakness, loss of bladder control, trouble swallowing, speaking, or breathing (caused by the toxin spreading to other parts of your body)  · This medicine may make your muscles weak or cause vision problems. Do not drive or do anything else that could be dangerous until you know how this medicine affects you.   · There are some warnings that only apply if you are receiving this medicine to treat the following:   ¨ Injections near the eye: This medicine may reduce blinking, which can raise the risk of eye problems such as corneal exposure and ulcers. Tell your doctor right away if you notice that you are blinking less than usual or your eyes feel dry. ¨ Urinary incontinence: This medicine may cause autonomic dysreflexia, which can be a life-threatening condition. ¨ Overactive bladder: Check with your doctor right away if you have trouble urinating or a burning sensation while urinating. · This medicine contains products from donated human blood, so it may contain viruses, although the risk is low. Human donors and blood are always tested for viruses to keep the risk low. Talk with your doctor about this risk if you are concerned. · Your doctor will check your progress and the effects of this medicine at regular visits. Keep all appointments. Possible Side Effects While Using This Medicine:   Call your doctor right away if you notice any of these side effects:  · Allergic reaction: Itching or hives, swelling in your face or hands, swelling or tingling in your mouth or throat, chest tightness, trouble breathing  · Blurred or double vision, droopy eyelids  · Change in how much or how often you urinate, trouble urinating, or painful urination  · Chest pain, slow or uneven heartbeat  · Headache, increased sweating, warmth or redness in your face, neck, or arm  · Muscle weakness  · Trouble swallowing, talking, or breathing  If you notice these less serious side effects, talk with your doctor:   · Fever, chills, cough, stuffy or runny nose, sore throat, and body aches  · Pain in your neck, back, arms, or legs  · Redness, pain, tenderness, bruising, swelling, or weakness where the shot was given  If you notice other side effects that you think are caused by this medicine, tell your doctor. Call your doctor for medical advice about side effects.  You may report side effects to FDA at 9-253-FDA-9819  © 2017 [facility] Information is for End User's use only and may not be sold, redistributed or otherwise used for commercial purposes. The above information is an  only. It is not intended as medical advice for individual conditions or treatments. Talk to your doctor, nurse or pharmacist before following any medical regimen to see if it is safe and effective for you.

## 2019-10-30 NOTE — TELEPHONE ENCOUNTER
----- Message from Chandrakant Dale sent at 7/26/2019  2:44 PM EDT -----  Regarding: Dr Jose Denis first and last name: Pt       Reason for call: Pt is following up on Rx for \"Abilify\" that was to be called in to the [facility], [address] [facility] ([facility]) [phone].  It was never called in      Callback required yes/no and why:      Best contact number(s): [phone]      Details to clarify the request:      Chandrakant Dale
Script was sent in on 07/23/19
Pt is 18y1m old  girl, identifies as bisexual, domiciled with mother and brother (19) and half brother (26) - dad lives [facility] (parents sep when pt was 4), attends [employer] school 12th grade reg ed, PMH of migraine, reported hx of depression and anxiety, no prior suicidal attempts or hospitalizations, hx of multiple ER eval to [facility] 9/20/18 and 11/9/18, hx non suicidal self harm via cutting, sent by school after endorsing to school episode of suicidal ideations and self injurious behavior last night.    Patient denies current symptoms of depression, amber, anxiety, psychosis, suicidal/homicidal ideations, intent or plans, denies auditory/visual hallucinations.  Patient does not represent an imminent threat of danger to self or others at this time.  Patient does not meet criteria for inpatient involuntary hospitalization.  Mother refused voluntary admission.  Patient will be discharged home and agrees to discharge disposition.  No acute safety concerns.

## 2019-12-05 ENCOUNTER — OFFICE VISIT (OUTPATIENT)
Dept: PRIMARY CARE CLINIC | Age: 64
End: 2019-12-05

## 2019-12-05 ENCOUNTER — HOSPITAL ENCOUNTER (OUTPATIENT)
Dept: GENERAL RADIOLOGY | Age: 64
Discharge: HOME | End: 2019-12-05
Payer: MEDICAID

## 2019-12-05 VITALS
TEMPERATURE: 97.9 F | SYSTOLIC BLOOD PRESSURE: 119 MMHG | OXYGEN SATURATION: 100 % | WEIGHT: 169 LBS | HEART RATE: 56 BPM | HEIGHT: 68 IN | RESPIRATION RATE: 16 BRPM | DIASTOLIC BLOOD PRESSURE: 74 MMHG | BODY MASS INDEX: 25.61 KG/M2

## 2019-12-05 DIAGNOSIS — Z00.00 ANNUAL PHYSICAL EXAM: Primary | ICD-10-CM

## 2019-12-05 DIAGNOSIS — E03.9 ACQUIRED HYPOTHYROIDISM: ICD-10-CM

## 2019-12-05 DIAGNOSIS — M54.2 NECK PAIN: ICD-10-CM

## 2019-12-05 DIAGNOSIS — Z78.0 POST-MENOPAUSAL: ICD-10-CM

## 2019-12-05 DIAGNOSIS — E55.9 VITAMIN D DEFICIENCY: ICD-10-CM

## 2019-12-05 DIAGNOSIS — H02.403 PTOSIS OF BOTH EYELIDS: ICD-10-CM

## 2019-12-05 PROCEDURE — 72050 X-RAY EXAM NECK SPINE 4/5VWS: CPT

## 2019-12-05 NOTE — PROGRESS NOTES
Chief Complaint   Patient presents with    Other     3 mo follow up    [address] [place] [address] Referral Request     plastic surgery and ortho     1. Have you been to the ER, urgent care clinic since your last visit? Hospitalized since your last visit? No    2. Have you seen or consulted any other health care providers outside of the [address] [place] [address] since your last visit? Include any pap smears or colon screening.  No

## 2019-12-05 NOTE — PROGRESS NOTES
Orbie Cranker is a 62 y.o.  female and presents with     Chief Complaint   Patient presents with    Other     3 mo follow up     Referral Request     plastic surgery and ortho    Neck Pain    Drooping Eyelid    Physical    Hypothyroidism     Pt  Wants to be referred to plastic surgery for bilateral ptosis . She says she cant keep her eyes open. Also has chronic neck pain and wants to see Dr Shivam Reeves who used to give her injections in lower back. Pt has h/o hypothyroidism. Denies any chest pain. Sees neurology and Psych. Past Medical History:   Diagnosis Date    Anxiety disorder     Breast cancer ([address]     right lumpectomy    Concussion     Depression     Headache     Radiation therapy complication     late 8955 or early 2012 for radiation     Past Surgical History:   Procedure Laterality Date    HX BREAST BIOPSY Left     benign 2011    HX BREAST LUMPECTOMY Right     2011     Current Outpatient Medications   Medication Sig    levothyroxine (SYNTHROID) 125 mcg tablet Take 1 Tab by mouth Daily (before breakfast).  onabotulinumtoxinA (BOTOX) 200 unit injection 200 units by IM route once every 12 weeks. Inject IM neck/face, 31 FDA approved sites. Indication: Migraine prevention. DX code: G43.71    traZODone (DESYREL) 300 mg tablet Take 300 mg by mouth nightly.  vilazodone (VIIBRYD) 40 mg tab tablet Take  by mouth daily.  topiramate (TOPAMAX) 100 mg tablet TAKE ONE TABLET BY MOUTH TWICE DAILY    buPROPion SR (WELLBUTRIN SR) 150 mg SR tablet TAKE ONE TABLET BY MOUTH TWICE DAILY (Patient taking differently: 300 mg.)    diazePAM (VALIUM) 10 mg tablet Take 10 mg by mouth every six (6) hours as needed for Anxiety. No current facility-administered medications for this visit.       Health Maintenance   Topic Date Due    DTaP/Tdap/Td series (1 - Tdap) 05/04/1967    Shingrix Vaccine Age 50> (1 of 2) 05/04/2006    FOBT Q 1 YEAR AGE 50-75  05/04/2006    Influenza Age 9 to Adult  08/01/2019  PAP AKA CERVICAL CYTOLOGY  01/30/2020    BREAST CANCER SCRN MAMMOGRAM  07/02/2020    Hepatitis C Screening  Completed    Pneumococcal 0-64 years  Aged Out       There is no immunization history on file for this patient. No LMP recorded. Patient is postmenopausal.        Allergies and Intolerances: Allergies   Allergen Reactions    Motrin [Ibuprofen] Unknown (comments)     ulcers       Family History:   Family History   Family history unknown: Yes       Social History:   She  reports that she has never smoked. She has never used smokeless tobacco.  She  reports no history of alcohol use. Review of Systems: pos symptoms as above  General: negative for - chills, fatigue, fever, weight change  Psych: negative for - anxiety, depression, irritability or mood swings  ENT: negative for - headaches, hearing change, nasal congestion, oral lesions, sneezing or sore throat  Heme/ Lymph: negative for - bleeding problems, bruising, pallor or swollen lymph nodes  Endo: negative for - hot flashes, polydipsia/polyuria or temperature intolerance  Resp: negative for - cough, shortness of breath or wheezing  CV: negative for - chest pain, edema or palpitations  GI: negative for - abdominal pain, change in bowel habits, constipation, diarrhea or nausea/vomiting  : negative for - dysuria, hematuria, incontinence, pelvic pain or vulvar/vaginal symptoms  MSK: negative for - joint pain, joint swelling or muscle pain  Neuro: negative for - confusion, headaches, seizures or weakness  Derm: negative for - dry skin, hair changes, rash or skin lesion changes          Physical:   Vitals:   Vitals:    12/05/19 1150   BP: 119/74   Pulse: (!) 56   Resp: 16   Temp: 97.9 °F (36.6 °C)   TempSrc: Oral   SpO2: 100%   Weight: 169 lb (76.7 kg)   Height: 5' 8\" (1.727 m)           Exam:   HEENT- atraumatic,normocephalic, awake, oriented, well nourished, mild bilateral ptosis ?   Neck - supple,no enlarged lymph nodes, no JVD, no thyromegaly  Chest- CTA, no rhonchi, no crackles  Heart- rrr, no murmurs / gallop/rub  Abdomen- soft,BS+,NT, no hepatosplenomegaly  Ext - no c/c/edema   Neuro- no focal deficits. Power 5/5 all extremities  Skin - warm,dry, no obvious rashes. Review of Data:   LABS:   Lab Results   Component Value Date/Time    WBC 4.8 06/24/2019 03:32 PM    HGB 12.8 06/24/2019 03:32 PM    HCT 37.4 06/24/2019 03:32 PM    PLATELET 948 18/44/1039 03:32 PM     Lab Results   Component Value Date/Time    Sodium 142 06/24/2019 03:32 PM    Potassium 4.2 06/24/2019 03:32 PM    Chloride 107 (H) 06/24/2019 03:32 PM    CO2 21 06/24/2019 03:32 PM    Glucose 96 06/24/2019 03:32 PM    BUN 18 06/24/2019 03:32 PM    Creatinine 0.86 06/24/2019 03:32 PM     Lab Results   Component Value Date/Time    Cholesterol, total 180 06/24/2019 03:32 PM    HDL Cholesterol 60 06/24/2019 03:32 PM    LDL, calculated 93 06/24/2019 03:32 PM    Triglyceride 135 06/24/2019 03:32 PM     No results found for: GPT        Impression / Plan:        ICD-10-CM ICD-9-CM    1. Annual physical exam Z00.00 V70.0    2. Neck pain M54.2 723.1 REFERRAL TO PAIN MANAGEMENT      CANCELED: XR SPINE CERV PA LAT ODONT 3 V MAX   3. Ptosis of both eyelids H02.403 374.30 MYASTHENIA GRAVIS PANEL (COMPLETE AB PROFILE)      REFERRAL TO PLASTIC SURGERY   4. Post-menopausal Z78.0 V49.81 DEXA BONE DENSITY STUDY AXIAL   5. Vitamin D deficiency E55.9 268.9 VITAMIN D, 25 HYDROXY   6. Acquired hypothyroidism E03.9 244.9 TSH 3RD GENERATION         Explained to patient risk benefits of the medications. Advised patient to stop meds if having any side effects. Pt verbalized understanding of the instructions. I have discussed the diagnosis with the patient and the intended plan as seen in the above orders. The patient has received an after-visit summary and questions were answered concerning future plans. I have discussed medication side effects and warnings with the patient as well.  I have reviewed the plan of care with the patient, accepted their input and they are in agreement with the treatment goals. Reviewed plan of care. Patient has provided input and agrees with goals. Follow-up and Dispositions    · Return in about 4 months (around 4/5/2020).          Joann Crane MD

## 2020-01-06 ENCOUNTER — DOCUMENTATION ONLY (OUTPATIENT)
Dept: NEUROLOGY | Age: 65
End: 2020-01-06

## 2020-01-14 ENCOUNTER — DOCUMENTATION ONLY (OUTPATIENT)
Dept: NEUROLOGY | Age: 65
End: 2020-01-14

## 2020-01-14 NOTE — PROGRESS NOTES
botox came in the office: 1/14/2020  MFR: allergtemo  LOT: [id number]  Exp: 6/2022  Appointment:1/23/2020  Specialty pharmacy:[facility]  Provider: Jomar Canales

## 2020-12-23 NOTE — TELEPHONE ENCOUNTER
Called [facility] spoke with [facility], was advised patient hasn't migrated over to them yet. Patient will be in their system on 10-01-19 Patient's information is still with [facility] would need to contact that pharmacy to order the patient's Botox. Gordon Hernandez transferred me to [facility]. No

## 2021-01-12 ENCOUNTER — VIRTUAL VISIT (OUTPATIENT)
Dept: NEUROLOGY | Age: 66
End: 2021-01-12

## 2021-01-21 ENCOUNTER — TELEPHONE (OUTPATIENT)
Dept: NEUROLOGY | Age: 66
End: 2021-01-21

## 2021-01-21 NOTE — TELEPHONE ENCOUNTER
----- Message from Kathleen Warren sent at 1/20/2021  1:52 PM EST -----  Regarding: Dr. Fischer Peak: [phone]  General Message/Vendor Calls    Caller's first and last name: PT      Reason for call: Patient would like a call back to discuss treatment options as she reports falling a lot, and needs to schedule an appointment for botox. The patient needs a script regarding \"synthroid\"       Callback required yes/no and why:Yes, treatment options. Best contact number(s):[phone]      Details to clarify the request: Patient has sent two messages with no response.        Kathleen Warren

## 2021-01-27 ENCOUNTER — TELEPHONE (OUTPATIENT)
Dept: NEUROLOGY | Age: 66
End: 2021-01-27

## 2021-02-02 ENCOUNTER — TELEPHONE (OUTPATIENT)
Dept: NEUROLOGY | Age: 66
End: 2021-02-02

## 2021-03-05 ENCOUNTER — TELEPHONE (OUTPATIENT)
Dept: NEUROLOGY | Age: 66
End: 2021-03-05

## 2021-03-05 NOTE — TELEPHONE ENCOUNTER
LVM for patient to return call in regards to upcoming appointment. Will need to inform patient that appointment originally scheduled for 3/16/2021 was rescheduled 4/15/2021 at 10:40am due to a scheduling error.     Early Number, LPN

## 2021-03-23 ENCOUNTER — OFFICE VISIT (OUTPATIENT)
Dept: FAMILY MEDICINE CLINIC | Age: 66
End: 2021-03-23
Payer: MEDICAID

## 2021-03-23 ENCOUNTER — HOSPITAL ENCOUNTER (OUTPATIENT)
Dept: GENERAL RADIOLOGY | Age: 66
Discharge: HOME | End: 2021-03-23
Payer: MEDICAID

## 2021-03-23 VITALS
HEIGHT: 68 IN | RESPIRATION RATE: 20 BRPM | OXYGEN SATURATION: 98 % | DIASTOLIC BLOOD PRESSURE: 62 MMHG | TEMPERATURE: 96.8 F | WEIGHT: 195 LBS | HEART RATE: 58 BPM | SYSTOLIC BLOOD PRESSURE: 131 MMHG | BODY MASS INDEX: 29.55 KG/M2

## 2021-03-23 DIAGNOSIS — E78.5 DYSLIPIDEMIA: ICD-10-CM

## 2021-03-23 DIAGNOSIS — E03.9 ACQUIRED HYPOTHYROIDISM: ICD-10-CM

## 2021-03-23 DIAGNOSIS — R01.1 HEART MURMUR: ICD-10-CM

## 2021-03-23 DIAGNOSIS — R35.0 FREQUENCY OF URINATION: ICD-10-CM

## 2021-03-23 DIAGNOSIS — Z01.818 PRE-OP EVALUATION: ICD-10-CM

## 2021-03-23 DIAGNOSIS — R06.02 SHORTNESS OF BREATH: ICD-10-CM

## 2021-03-23 DIAGNOSIS — Z76.89 ESTABLISHING CARE WITH NEW DOCTOR, ENCOUNTER FOR: Primary | ICD-10-CM

## 2021-03-23 DIAGNOSIS — E55.9 VITAMIN D DEFICIENCY: ICD-10-CM

## 2021-03-23 DIAGNOSIS — Z12.11 COLON CANCER SCREENING: ICD-10-CM

## 2021-03-23 DIAGNOSIS — R73.03 BORDERLINE DIABETES MELLITUS: ICD-10-CM

## 2021-03-23 DIAGNOSIS — Z12.31 ENCOUNTER FOR SCREENING MAMMOGRAM FOR BREAST CANCER: ICD-10-CM

## 2021-03-23 PROBLEM — F41.9 ANXIETY: Status: ACTIVE | Noted: 2018-06-19

## 2021-03-23 PROBLEM — Z85.3 HISTORY OF MALIGNANT NEOPLASM OF BREAST: Status: ACTIVE | Noted: 2018-06-19

## 2021-03-23 PROBLEM — F98.8 ATTENTION DEFICIT DISORDER: Status: ACTIVE | Noted: 2021-03-23

## 2021-03-23 PROBLEM — F31.9 BIPOLAR 1 DISORDER (HCC): Status: ACTIVE | Noted: 2018-06-19

## 2021-03-23 PROBLEM — F43.10 POSTTRAUMATIC STRESS DISORDER: Status: ACTIVE | Noted: 2021-03-23

## 2021-03-23 PROBLEM — F60.3 BORDERLINE PERSONALITY DISORDER (HCC): Status: ACTIVE | Noted: 2021-03-23

## 2021-03-23 PROBLEM — G43.909 MIGRAINE: Status: ACTIVE | Noted: 2018-06-19

## 2021-03-23 PROCEDURE — 93000 ELECTROCARDIOGRAM COMPLETE: CPT | Performed by: INTERNAL MEDICINE

## 2021-03-23 PROCEDURE — 71046 X-RAY EXAM CHEST 2 VIEWS: CPT

## 2021-03-23 PROCEDURE — 99204 OFFICE O/P NEW MOD 45 MIN: CPT | Performed by: INTERNAL MEDICINE

## 2021-03-23 NOTE — PATIENT INSTRUCTIONS
Learning About How to Prepare for Surgery  How can you prepare before surgery? You can do some things that will help you safely prepare for surgery. · Understand exactly what surgery is planned. You should know the risks, benefits, and other options. · Tell your doctors ALL the medicines, vitamins, supplements, and herbal remedies you take. Some of these can increase the risk of bleeding. Or they may interact with anesthesia. · Follow your doctor's instructions about which medicines to take or stop before your surgery. ? You may need to stop taking some medicines a week or more before surgery. ? If you take aspirin or some other blood thinner, be sure to talk to your doctor. · Follow any other instructions your doctor gave you. · If you have an advance directive, let your doctor know, and bring a copy to the hospital.   It may include a living will and a durable power of  for health care. It lets your doctor and loved ones know your health care wishes. If you don't have one, you may want to prepare one. How can you prepare on the day of surgery? Here are some tips about what to do at home before you leave for your surgery. · If your doctor told you to take your medicines on the day of surgery, take them with only a sip of water. · Follow the instructions about when to stop eating and drinking. If you don't, your surgery may be canceled. · Follow your doctor's instructions about when to bathe or shower before your surgery. · Do not shave the surgical site yourself. · Take off all jewelry and piercings. · Take out contact lenses, if you wear them. · Have a picture ID ready to take with you. Your ID will be checked before your surgery. · Know when to call your doctor. Call your doctor if you:  ? Become ill before surgery. ? Need to reschedule. ? Have changed your mind about having the surgery. What happens before surgery?   Here are some things you can expect to happen before your surgery. · Your picture ID will be checked. · The area of your body that needs surgery is often marked to make sure there are no errors. · You will be kept comfortable and safe by your anesthesia provider. The anesthesia may make you sleep. Or it may just numb the area being worked on. What happens when you are ready to go home? Be sure you have someone drive you home. Anesthesia and pain medicine make it unsafe for you to drive. You will get instructions about recovering from your surgery. This is called a discharge plan. It will cover things like diet, wound care, follow-up care, driving, and getting back to your normal routine. Follow-up care is a key part of your treatment and safety. Be sure to make and go to all appointments, and call your doctor if you are having problems. It's also a good idea to know your test results and keep a list of the medicines you take. Where can you learn more? Go to http://www.gray.com/  Enter Q270 in the search box to learn more about \"Learning About How to Prepare for Surgery. \"  Current as of: May 27, 2020               Content Version: 12.6  © 5917-8693 [employer]. Care instructions adapted under license by [employer] (which disclaims liability or warranty for this information). If you have questions about a medical condition or this instruction, always ask your healthcare professional. [address] any warranty or liability for your use of this information.

## 2021-03-23 NOTE — PROGRESS NOTES
Chief Complaint   Patient presents with    New Patient    Pre-op Exam     surgery 3/26/2021     HPI:  Li Mantilla is a 60 y.o. female with h/o hypothyroidism, anxiety/depression presents to establish care for Pre-op evaluate  Patient has a left knee surgery scheduled 3/26/2021 with Dr. Oracio Reagan. Patient had a left knee injury. She has complaint of sob  Psychiatry: Dr. Meri Figueroa. Review of Systems  Constitutional: negative for fevers, chills  Eyes:   negative for visual disturbance and irritation  ENT:   negative for tinnitus,sore throat,nasal congestion,ear pains. hoarseness  Respiratory:  positive for shortness of breath  CV:   negative for chest pain, palpitations, lower extremity edema  GI:   negative for  abdominal pain  Endo:             negative for polyuria,polydipsia,polyphagia,heat intolerance  Genitourinary: positive for frequency, no dysuria and hematuria  Integument:  negative for rash and pruritus  Hematologic:  negative for easy bruising and gum/nose bleeding  Musculoskel: negative for myalgias, arthralgias, back pain,  Neurological:  negative for headaches, dizziness  Behavl/Psych: negative for feelings of anxiety, depression, mood changes    Past Medical History:   Diagnosis Date    Anxiety disorder     Breast cancer ([facility] [address]     right lumpectomy    Concussion     Depression     Headache     Psychotic disorder ([facility] [address]     Radiation therapy complication     late 2374 or early 2012 for radiation     Past Surgical History:   Procedure Laterality Date    HX BREAST BIOPSY Left     benign 2011    HX BREAST LUMPECTOMY Right     2011    HX HYSTERECTOMY      HX ROTATOR CUFF REPAIR       Social History     Socioeconomic History    Marital status: SINGLE     Spouse name: Not on file    Number of children: Not on file    Years of education: Not on file    Highest education level: Not on file   Tobacco Use    Smoking status: Never Smoker    Smokeless tobacco: Never Used   Substance and Sexual Activity    Alcohol use: Never     Frequency: Never    Drug use: Never    Sexual activity: Not Currently     Family History   Adopted: Yes   Family history unknown: Yes     Current Outpatient Medications   Medication Sig Dispense Refill    levothyroxine (SYNTHROID) 125 mcg tablet Take 1 Tab by mouth Daily (before breakfast). 90 Tab 3    onabotulinumtoxinA (BOTOX) 200 unit injection 200 units by IM route once every 12 weeks. Inject IM neck/face, 31 FDA approved sites. Indication: Migraine prevention. DX code: G43.71 1 Vial 3    traZODone (DESYREL) 300 mg tablet Take 300 mg by mouth nightly.  vilazodone (VIIBRYD) 40 mg tab tablet Take  by mouth daily.  topiramate (TOPAMAX) 100 mg tablet TAKE ONE TABLET BY MOUTH TWICE DAILY 60 Tab 3    buPROPion SR (WELLBUTRIN SR) 150 mg SR tablet TAKE ONE TABLET BY MOUTH TWICE DAILY (Patient taking differently: 300 mg.) 60 Tab 3    diazePAM (VALIUM) 10 mg tablet Take 10 mg by mouth every six (6) hours as needed for Anxiety.       influenza vaccine 2017-18, 4 yrs+,,PF, (FLUCELVAX QUAD) syrg injection Flucelvax Quad [id number] (PF) 60 mcg (15 mcg x 4)/0.5 mL IM syringe   TO BE ADMINISTERED BY PHARMACIST       Allergies   Allergen Reactions    Motrin [Ibuprofen] Unknown (comments)     ulcers     Objective:  Visit Vitals  /62   Pulse (!) 58   Temp 96.8 °F (36 °C) (Temporal)   Resp 20   Ht 5' 8\" (1.727 m)   Wt 195 lb (88.5 kg)   SpO2 98%   BMI 29.65 kg/m²     Physical Exam:   General appearance - alert, well appearing in no distress  Mental status - alert, oriented to person, place, and time  EYE-PERRL, EOMI  ENT-ENT exam normal, no neck nodes or sinus tenderness  Nose - no congestion   Neck - supple, no significant adenopathy   Chest - clear to auscultation, no wheezes, rales or rhonchi  Heart - normal rate, regular rhythm, diastolic murmurs   Abdomen - soft, nontender, nondistended, no  organomegaly  Ext-peripheral pulses normal, no pedal edema  Skin-Warm and dry. no rash  Neuro -no focal findings   Back-full range of motion, no tenderness, palpable spasm or pain on motion   Knee-antalgic gait, reduced range of motion, tender on medial and lateral aspects     Results for orders placed or performed during the hospital encounter of 07/05/19   ECHO ADULT COMPLETE   Result Value Ref Range    Aortic Valve Systolic Peak Velocity 992.69 cm/s    Aortic Valve Area by Continuity of Peak Velocity 1.9 cm2    AoV PG 9.2 mmHg    LVIDd 4.16 3.90 - 5.30 cm    LVPWd 1.13 (A) 0.60 - 0.90 cm    LVIDs 3.48 cm    IVSd 1.27 (A) 0.60 - 0.90 cm    LVOT d 1.81 cm    LVOT Peak Velocity 113.45 cm/s    LVOT Peak Gradient 5.1 mmHg    LVOT VTI 26.77 cm    MVA (PHT) 4.8 cm2    MV A Roland 73.43 cm/s    MV E Roland 102.47 cm/s    MV E/A 1.40     MV Mean Gradient 1.8 mmHg    Mitral Valve Annulus Velocity Time Integral 30.95 cm    TV PG 17.5 mmHg    LA Vol 4C 47.32 22.00 - 52.00 mL    LV Mass .0 (A) 67.0 - 162.0 g    LVPWs 0.98 cm    RVSP 37.0 mmHg    MV Peak Gradient 4.8 mmHg    Est. RA Pressure 10.0 mmHg    Mitral Regurgitant Peak Velocity 114.46 cm/s    Mitral Valve E Wave Deceleration Time 159.4 ms    Mitral Valve Pressure Half-time 46.2 ms    Triscuspid Valve Regurgitation Peak Gradient 27.0 mmHg    Mitral Valve Max Velocity 109.09 cm/s    Tricuspid Valve Max Velocity 209.21 cm/s    MVA VTI 2.2 cm2    LVOT SV 69.1 ml    TR Max Velocity 259.63 cm/s    PASP 37.0 mmHg    MR Peak Gradient 5.2 mmHg    Ao Root D 3.24 cm    LUIS/BSA Pk Roland 1.0 cm2/m2     Assessment/Plan:  Diagnoses and all orders for this visit:    Establishing care with new doctor, encounter for  -     METABOLIC PANEL, COMPREHENSIVE; Future  -     CBC W/O DIFF; Future  -     METABOLIC PANEL, COMPREHENSIVE  -     CBC W/O DIFF    Shortness of breath  -     AMB POC EKG ROUTINE W/ 12 LEADS, INTER & REP  -     XR CHEST PA LAT; Future  -     METABOLIC PANEL, COMPREHENSIVE; Future  -     CBC W/O DIFF;  Future  -     METABOLIC PANEL, COMPREHENSIVE  -     CBC W/O DIFF  -     REFERRAL TO CARDIOLOGY    Encounter for screening mammogram for breast cancer  -     FILEMON MAMMO BI SCREENING INCL CAD; Future    Colon cancer screening  -     COLOGUARD TEST (FECAL DNA COLORECTAL CANCER SCREENING)    Pre-op evaluation  -     AMB POC EKG ROUTINE W/ 12 LEADS, INTER & REP  -     METABOLIC PANEL, COMPREHENSIVE; Future  -     CBC W/O DIFF; Future  -     PROTHROMBIN TIME + INR; Future  -     METABOLIC PANEL, COMPREHENSIVE  -     CBC W/O DIFF  -     PROTHROMBIN TIME + INR  -     REFERRAL TO CARDIOLOGY    Heart murmur  -     AMB POC EKG ROUTINE W/ 12 LEADS, INTER & REP  -     METABOLIC PANEL, COMPREHENSIVE; Future  -     CBC W/O DIFF; Future  -     METABOLIC PANEL, COMPREHENSIVE  -     CBC W/O DIFF  -     REFERRAL TO CARDIOLOGY    Acquired hypothyroidism  -     TSH 3RD GENERATION; Future  -     TSH 3RD GENERATION    Dyslipidemia  -     LIPID PANEL; Future  -     LIPID PANEL    Borderline diabetes mellitus  -     HEMOGLOBIN A1C WITH EAG; Future  -     HEMOGLOBIN A1C WITH EAG    Vitamin D deficiency  -     VITAMIN D, 25 HYDROXY; Future  -     VITAMIN D, 25 HYDROXY    Frequency of urination  -     URINALYSIS W/ RFLX MICROSCOPIC; Future  -     URINALYSIS W/ RFLX MICROSCOPIC      Patient Instructions        Learning About How to Prepare for Surgery  How can you prepare before surgery? You can do some things that will help you safely prepare for surgery. · Understand exactly what surgery is planned. You should know the risks, benefits, and other options. · Tell your doctors ALL the medicines, vitamins, supplements, and herbal remedies you take. Some of these can increase the risk of bleeding. Or they may interact with anesthesia. · Follow your doctor's instructions about which medicines to take or stop before your surgery. ? You may need to stop taking some medicines a week or more before surgery.   ? If you take aspirin or some other blood thinner, be sure to talk to your doctor. · Follow any other instructions your doctor gave you. · If you have an advance directive, let your doctor know, and bring a copy to the hospital.   It may include a living will and a durable power of  for health care. It lets your doctor and loved ones know your health care wishes. If you don't have one, you may want to prepare one. How can you prepare on the day of surgery? Here are some tips about what to do at home before you leave for your surgery. · If your doctor told you to take your medicines on the day of surgery, take them with only a sip of water. · Follow the instructions about when to stop eating and drinking. If you don't, your surgery may be canceled. · Follow your doctor's instructions about when to bathe or shower before your surgery. · Do not shave the surgical site yourself. · Take off all jewelry and piercings. · Take out contact lenses, if you wear them. · Have a picture ID ready to take with you. Your ID will be checked before your surgery. · Know when to call your doctor. Call your doctor if you:  ? Become ill before surgery. ? Need to reschedule. ? Have changed your mind about having the surgery. What happens before surgery? Here are some things you can expect to happen before your surgery. · Your picture ID will be checked. · The area of your body that needs surgery is often marked to make sure there are no errors. · You will be kept comfortable and safe by your anesthesia provider. The anesthesia may make you sleep. Or it may just numb the area being worked on. What happens when you are ready to go home? Be sure you have someone drive you home. Anesthesia and pain medicine make it unsafe for you to drive. You will get instructions about recovering from your surgery. This is called a discharge plan. It will cover things like diet, wound care, follow-up care, driving, and getting back to your normal routine.   Follow-up care is a key part of your treatment and safety. Be sure to make and go to all appointments, and call your doctor if you are having problems. It's also a good idea to know your test results and keep a list of the medicines you take. Where can you learn more? Go to http://www.gray.com/  Enter Q270 in the search box to learn more about \"Learning About How to Prepare for Surgery. \"  Current as of: May 27, 2020               Content Version: 12.6  © 2006-2020 [employer]. Care instructions adapted under license by [employer] (which disclaims liability or warranty for this information). If you have questions about a medical condition or this instruction, always ask your healthcare professional. [address] any warranty or liability for your use of this information. Follow-up and Dispositions    · Return 1-2 weeks, for f/u results.

## 2021-03-27 LAB
25(OH)D3+25(OH)D2 SERPL-MCNC: 14.2 NG/ML (ref 30–100)
ALBUMIN SERPL-MCNC: 4.5 G/DL (ref 3.8–4.8)
ALBUMIN/GLOB SERPL: 2.3 {RATIO} (ref 1.2–2.2)
ALP SERPL-CCNC: 82 IU/L (ref 39–117)
ALT SERPL-CCNC: 12 IU/L (ref 0–32)
APPEARANCE UR: CLEAR
AST SERPL-CCNC: 14 IU/L (ref 0–40)
BACTERIA #/AREA URNS HPF: ABNORMAL /[HPF]
BILIRUB SERPL-MCNC: 0.4 MG/DL (ref 0–1.2)
BILIRUB UR QL STRIP: NEGATIVE
BUN SERPL-MCNC: 23 MG/DL (ref 8–27)
BUN/CREAT SERPL: 26 (ref 12–28)
CALCIUM SERPL-MCNC: 9.7 MG/DL (ref 8.7–10.3)
CASTS URNS QL MICRO: ABNORMAL /LPF
CHLORIDE SERPL-SCNC: 106 MMOL/L (ref 96–106)
CHOLEST SERPL-MCNC: 216 MG/DL (ref 100–199)
CO2 SERPL-SCNC: 22 MMOL/L (ref 20–29)
COLOR UR: YELLOW
CREAT SERPL-MCNC: 0.87 MG/DL (ref 0.57–1)
EPI CELLS #/AREA URNS HPF: ABNORMAL /HPF (ref 0–10)
ERYTHROCYTE [DISTWIDTH] IN BLOOD BY AUTOMATED COUNT: 12.3 % (ref 11.7–15.4)
EST. AVERAGE GLUCOSE BLD GHB EST-MCNC: 105 MG/DL
GLOBULIN SER CALC-MCNC: 2 G/DL (ref 1.5–4.5)
GLUCOSE SERPL-MCNC: 87 MG/DL (ref 65–99)
GLUCOSE UR QL: NEGATIVE
HBA1C MFR BLD: 5.3 % (ref 4.8–5.6)
HCT VFR BLD AUTO: 38.5 % (ref 34–46.6)
HDLC SERPL-MCNC: 71 MG/DL
HGB BLD-MCNC: 12.9 G/DL (ref 11.1–15.9)
HGB UR QL STRIP: NEGATIVE
INR PPP: 1 (ref 0.9–1.2)
KETONES UR QL STRIP: NEGATIVE
LDLC SERPL CALC-MCNC: 123 MG/DL (ref 0–99)
LEUKOCYTE ESTERASE UR QL STRIP: ABNORMAL
MCH RBC QN AUTO: 31.2 PG (ref 26.6–33)
MCHC RBC AUTO-ENTMCNC: 33.5 G/DL (ref 31.5–35.7)
MCV RBC AUTO: 93 FL (ref 79–97)
MICRO URNS: ABNORMAL
NITRITE UR QL STRIP: NEGATIVE
PH UR STRIP: 5 [PH] (ref 5–7.5)
PLATELET # BLD AUTO: 199 X10E3/UL (ref 150–450)
POTASSIUM SERPL-SCNC: 4.4 MMOL/L (ref 3.5–5.2)
PROT SERPL-MCNC: 6.5 G/DL (ref 6–8.5)
PROT UR QL STRIP: NEGATIVE
PROTHROMBIN TIME: 10.5 SEC (ref 9.1–12)
RBC # BLD AUTO: 4.13 X10E6/UL (ref 3.77–5.28)
RBC #/AREA URNS HPF: ABNORMAL /HPF (ref 0–2)
SODIUM SERPL-SCNC: 140 MMOL/L (ref 134–144)
SP GR UR: 1.03 (ref 1–1.03)
TRIGL SERPL-MCNC: 129 MG/DL (ref 0–149)
TSH SERPL DL<=0.005 MIU/L-ACNC: 7.54 UIU/ML (ref 0.45–4.5)
UROBILINOGEN UR STRIP-MCNC: 0.2 MG/DL (ref 0.2–1)
VLDLC SERPL CALC-MCNC: 22 MG/DL (ref 5–40)
WBC # BLD AUTO: 4.9 X10E3/UL (ref 3.4–10.8)
WBC #/AREA URNS HPF: ABNORMAL /HPF (ref 0–5)

## 2021-03-30 ENCOUNTER — OFFICE VISIT (OUTPATIENT)
Dept: FAMILY MEDICINE CLINIC | Age: 66
End: 2021-03-30
Payer: MEDICAID

## 2021-03-30 VITALS
BODY MASS INDEX: 29.55 KG/M2 | HEIGHT: 68 IN | DIASTOLIC BLOOD PRESSURE: 69 MMHG | RESPIRATION RATE: 20 BRPM | WEIGHT: 195 LBS | OXYGEN SATURATION: 98 % | SYSTOLIC BLOOD PRESSURE: 127 MMHG | HEART RATE: 61 BPM | TEMPERATURE: 97.3 F

## 2021-03-30 DIAGNOSIS — E03.9 ACQUIRED HYPOTHYROIDISM: Primary | ICD-10-CM

## 2021-03-30 DIAGNOSIS — E78.5 DYSLIPIDEMIA: ICD-10-CM

## 2021-03-30 DIAGNOSIS — Z85.3 ENCOUNTER FOR FOLLOW-UP SURVEILLANCE OF BREAST CANCER: ICD-10-CM

## 2021-03-30 DIAGNOSIS — Z08 ENCOUNTER FOR FOLLOW-UP SURVEILLANCE OF BREAST CANCER: ICD-10-CM

## 2021-03-30 DIAGNOSIS — N30.00 ACUTE CYSTITIS WITHOUT HEMATURIA: ICD-10-CM

## 2021-03-30 DIAGNOSIS — E55.9 VITAMIN D DEFICIENCY: ICD-10-CM

## 2021-03-30 DIAGNOSIS — Z12.11 COLON CANCER SCREENING: ICD-10-CM

## 2021-03-30 PROBLEM — N75.0 CYST OF BARTHOLIN'S GLAND DUCT: Status: ACTIVE | Noted: 2021-03-30

## 2021-03-30 PROBLEM — G47.10 HYPERSOMNIA: Status: ACTIVE | Noted: 2021-03-30

## 2021-03-30 PROBLEM — K58.9 IRRITABLE BOWEL SYNDROME: Status: ACTIVE | Noted: 2021-03-30

## 2021-03-30 PROBLEM — F41.0 PANIC DISORDER: Status: ACTIVE | Noted: 2021-03-30

## 2021-03-30 PROCEDURE — 99214 OFFICE O/P EST MOD 30 MIN: CPT | Performed by: INTERNAL MEDICINE

## 2021-03-30 RX ORDER — CHOLECALCIFEROL (VITAMIN D3) 125 MCG
5000 CAPSULE ORAL DAILY
Qty: 90 CAP | Refills: 1 | Status: SHIPPED | OUTPATIENT
Start: 2021-03-30 | End: 2021-08-02 | Stop reason: SDUPTHER

## 2021-03-30 RX ORDER — LEVOTHYROXINE SODIUM 125 UG/1
125 TABLET ORAL
Qty: 90 TAB | Refills: 1 | Status: SHIPPED | OUTPATIENT
Start: 2021-03-30 | End: 2021-04-27

## 2021-03-30 RX ORDER — CIPROFLOXACIN 500 MG/1
500 TABLET ORAL 2 TIMES DAILY
Qty: 10 TAB | Refills: 0 | Status: SHIPPED | OUTPATIENT
Start: 2021-03-30 | End: 2021-04-04

## 2021-03-30 NOTE — PROGRESS NOTES
Chief Complaint   Patient presents with    Results     labs and xray / seeing cardio on 4/12/2021    Referral Follow Up     Cologuard    Follow-up     knee          HPI:  [place] is a 60 y.o. female presents to follow up results. CXR is normal.  Tot chol & LDL are elevated. Vit is below normal, TSH is markedly elevated. Urine shows urinary tract infection. Patient has not  Taken levothyroxine for months. Advised to resume medication. Appointment with cardiology on 4/12/2021. Clearance for knee surgery will depend on cardiology evaluation. She needs a referral to GI for colon cancer screening. Review of Systems  As per hpi    Past Medical History:   Diagnosis Date    Anxiety disorder     Breast cancer ([facility] [address]     right lumpectomy    Concussion     Depression     Headache     Psychotic disorder ([facility] [address]     Radiation therapy complication     late 1926 or early 2012 for radiation     Past Surgical History:   Procedure Laterality Date    HX BREAST BIOPSY Left     benign 2011    HX BREAST LUMPECTOMY Right     2011    HX HYSTERECTOMY      HX ROTATOR CUFF REPAIR       Social History     Socioeconomic History    Marital status: SINGLE     Spouse name: Not on file    Number of children: Not on file    Years of education: Not on file    Highest education level: Not on file   Tobacco Use    Smoking status: Never Smoker    Smokeless tobacco: Never Used   Substance and Sexual Activity    Alcohol use: Never     Frequency: Never    Drug use: Never    Sexual activity: Not Currently     Family History   Adopted: Yes   Family history unknown: Yes     Current Outpatient Medications   Medication Sig Dispense Refill    influenza vaccine 2017-18, 4 yrs+,,PF, (FLUCELVAX QUAD) syrg injection Flucelvax Quad [id number] (PF) 60 mcg (15 mcg x 4)/0.5 mL IM syringe   TO BE ADMINISTERED BY PHARMACIST      onabotulinumtoxinA (BOTOX) 200 unit injection 200 units by IM route once every 12 weeks.  Inject IM neck/face, 31 FDA approved sites. Indication: Migraine prevention. DX code: G43.71 1 Vial 3    traZODone (DESYREL) 300 mg tablet Take 300 mg by mouth nightly.  vilazodone (VIIBRYD) 40 mg tab tablet Take  by mouth daily.  topiramate (TOPAMAX) 100 mg tablet TAKE ONE TABLET BY MOUTH TWICE DAILY 60 Tab 3    buPROPion SR (WELLBUTRIN SR) 150 mg SR tablet TAKE ONE TABLET BY MOUTH TWICE DAILY (Patient taking differently: 300 mg.) 60 Tab 3    diazePAM (VALIUM) 10 mg tablet Take 10 mg by mouth every six (6) hours as needed for Anxiety. Allergies   Allergen Reactions    Motrin [Ibuprofen] Unknown (comments)     ulcers     Objective:  Visit Vitals  /69   Pulse 61   Temp 97.3 °F (36.3 °C) (Temporal)   Resp 20   Ht 5' 8\" (1.727 m)   Wt 195 lb (88.5 kg)   SpO2 98%   BMI 29.65 kg/m²     Physical Exam:   General appearance - alert, well appearing in no distress  Mental status - alert, oriented to person, place, and time  Neck - supple, no significant adenopathy   Chest - clear to auscultation, no wheezes, rales or rhonchi  Heart - normal rate, regular rhythm, no murmurs  Abdomen - soft, nontender, nondistended, no organomegaly  Ext-peripheral pulses normal, no pedal edema  Neuro - no focal findings   Knee-antalgic gait, soft tissue tenderness over lateral aspect of left knee. Results for orders placed or performed in visit on 44/65/27   METABOLIC PANEL, COMPREHENSIVE   Result Value Ref Range    Glucose 87 65 - 99 mg/dL    BUN 23 8 - 27 mg/dL    Creatinine 0.87 0.57 - 1.00 mg/dL    GFR est non-AA 71 >59 mL/min/1.73    GFR est AA 81 >59 mL/min/1.73    BUN/Creatinine ratio 26 12 - 28    Sodium 140 134 - 144 mmol/L    Potassium 4.4 3.5 - 5.2 mmol/L    Chloride 106 96 - 106 mmol/L    CO2 22 20 - 29 mmol/L    Calcium 9.7 8.7 - 10.3 mg/dL    Protein, total 6.5 6.0 - 8.5 g/dL    Albumin 4.5 3.8 - 4.8 g/dL    GLOBULIN, TOTAL 2.0 1.5 - 4.5 g/dL    A-G Ratio 2.3 (H) 1.2 - 2.2    Bilirubin, total 0.4 0.0 - 1.2 mg/dL    Alk. phosphatase 82 39 - 117 IU/L    AST (SGOT) 14 0 - 40 IU/L    ALT (SGPT) 12 0 - 32 IU/L   CBC W/O DIFF   Result Value Ref Range    WBC 4.9 3.4 - 10.8 x10E3/uL    RBC 4.13 3.77 - 5.28 x10E6/uL    HGB 12.9 11.1 - 15.9 g/dL    HCT 38.5 34.0 - 46.6 %    MCV 93 79 - 97 fL    MCH 31.2 26.6 - 33.0 pg    MCHC 33.5 31.5 - 35.7 g/dL    RDW 12.3 11.7 - 15.4 %    PLATELET 199 150 - 450 x10E3/uL   LIPID PANEL   Result Value Ref Range    Cholesterol, total 216 (H) 100 - 199 mg/dL    Triglyceride 129 0 - 149 mg/dL    HDL Cholesterol 71 >39 mg/dL    VLDL, calculated 22 5 - 40 mg/dL    LDL, calculated 123 (H) 0 - 99 mg/dL   HEMOGLOBIN A1C WITH EAG   Result Value Ref Range    Hemoglobin A1c 5.3 4.8 - 5.6 %    Estimated average glucose 105 mg/dL   TSH 3RD GENERATION   Result Value Ref Range    TSH 7.540 (H) 0.450 - 4.500 uIU/mL   PROTHROMBIN TIME + INR   Result Value Ref Range    INR 1.0 0.9 - 1.2    Prothrombin time 10.5 9.1 - 12.0 sec   VITAMIN D, 25 HYDROXY   Result Value Ref Range    VITAMIN D, 25-HYDROXY 14.2 (L) 30.0 - 100.0 ng/mL   URINALYSIS W/ RFLX MICROSCOPIC   Result Value Ref Range    Specific Gravity 1.027 1.005 - 1.030    pH (UA) 5.0 5.0 - 7.5    Color Yellow Yellow    Appearance Clear Clear    Leukocyte Esterase 2+ (A) Negative    Protein Negative Negative/Trace    Glucose Negative Negative    Ketone Negative Negative    Blood Negative Negative    Bilirubin Negative Negative    Urobilinogen 0.2 0.2 - 1.0 mg/dL    Nitrites Negative Negative    Microscopic Examination See additional order    MICROSCOPIC EXAMINATION   Result Value Ref Range    WBC 6-10 (A) 0 - 5 /hpf    RBC 0-2 0 - 2 /hpf    Epithelial cells 0-10 0 - 10 /hpf    Casts None seen None seen /lpf    Bacteria Moderate (A) None seen/Few     Assessment/Plan:  Diagnoses and all orders for this visit:    Acquired hypothyroidism  -     levothyroxine (SYNTHROID) 125 mcg tablet; Take 1 Tab by mouth Daily (before breakfast)., Normal, Disp-90 Tab,  R-1    Dyslipidemia    Acute cystitis without hematuria  -     ciprofloxacin HCl (CIPRO) 500 mg tablet; Take 1 Tab by mouth two (2) times a day for 5 days. , Normal, Disp-10 Tab, R-0    Vitamin D deficiency  -     cholecalciferol (VITAMIN D3) (5000 Units /125 mcg) capsule; Take 1 Cap by mouth daily. , Normal, Disp-90 Cap, R-1    Colon cancer screening  -     Cancel: COLOGUARD TEST (FECAL DNA COLORECTAL CANCER SCREENING)  -     REFERRAL TO GASTROENTEROLOGY    Encounter for follow-up surveillance of breast cancer  -     FILEMON MAMMO BI DX INCL CAD; Future      Patient Instructions          Heart-Healthy Diet: Care Instructions  Your Care Instructions     A heart-healthy diet has lots of vegetables, fruits, nuts, beans, and whole grains, and is low in salt. It limits foods that are high in saturated fat, such as meats, cheeses, and fried foods. It may be hard to change your diet, but even small changes can lower your risk of heart attack and heart disease. Follow-up care is a key part of your treatment and safety. Be sure to make and go to all appointments, and call your doctor if you are having problems. It's also a good idea to know your test results and keep a list of the medicines you take. How can you care for yourself at home? Watch your portions  · Learn what a serving is. A \"serving\" and a \"portion\" are not always the same thing. Make sure that you are not eating larger portions than are recommended. For example, a serving of pasta is ½ cup. A serving size of meat is 2 to 3 ounces. A 3-ounce serving is about the size of a deck of cards. Measure serving sizes until you are good at [place]" them. Keep in mind that restaurants often serve portions that are 2 or 3 times the size of one serving. · To keep your energy level up and keep you from feeling hungry, eat often but in smaller portions. · Eat only the number of calories you need to stay at a healthy weight.  If you need to lose weight, eat fewer calories than your body burns (through exercise and other physical activity). Eat more fruits and vegetables  · Eat a variety of fruit and vegetables every day. Dark green, deep orange, red, or yellow fruits and vegetables are especially good for you. Examples include spinach, carrots, peaches, and berries. · Keep carrots, celery, and other veggies handy for snacks. Buy fruit that is in season and store it where you can see it so that you will be tempted to eat it. · Cook dishes that have a lot of veggies in them, such as stir-fries and soups. Limit saturated and trans fat  · Read food labels, and try to avoid saturated and trans fats. They increase your risk of heart disease. · Use olive or canola oil when you cook. · Bake, broil, grill, or steam foods instead of frying them. · Choose lean meats instead of high-fat meats such as hot dogs and sausages. Cut off all visible fat when you prepare meat. · Eat fish, skinless poultry, and meat alternatives such as soy products instead of high-fat meats. Soy products, such as tofu, may be especially good for your heart. · Choose low-fat or fat-free milk and dairy products. Eat foods high in fiber  · Eat a variety of grain products every day. Include whole-grain foods that have lots of fiber and nutrients. Examples of whole-grain foods include oats, whole wheat bread, and brown rice. · Buy whole-grain breads and cereals, instead of white bread or pastries. Limit salt and sodium  · Limit how much salt and sodium you eat to help lower your blood pressure. · Taste food before you salt it. Add only a little salt when you think you need it. With time, your taste buds will adjust to less salt. · Eat fewer snack items, fast foods, and other high-salt, processed foods. Check food labels for the amount of sodium in packaged foods. · Choose low-sodium versions of canned goods (such as soups, vegetables, and beans). Limit sugar  · Limit drinks and foods with added sugar.  These include candy, desserts, and soda pop. Limit alcohol  · Limit alcohol to no more than 2 drinks a day for men and 1 drink a day for women. Too much alcohol can cause health problems. When should you call for help? Watch closely for changes in your health, and be sure to contact your doctor if:    · You would like help planning heart-healthy meals. Where can you learn more? Go to http://www.gudino.com/  Enter V137 in the search box to learn more about \"Heart-Healthy Diet: Care Instructions. \"  Current as of: August 22, 2019               Content Version: 12.6  © 7862-2169 [employer]. Care instructions adapted under license by [employer] (which disclaims liability or warranty for this information). If you have questions about a medical condition or this instruction, always ask your healthcare professional. [address] any warranty or liability for your use of this information. Follow-up and Dispositions    · Return in about 3 months (around 6/30/2021), or if symptoms worsen or fail to improve, for routine follow up.

## 2021-03-30 NOTE — LETTER
3/30/2021 Ms. Li Mantilla [phone] Dear Li Mantilla: 
 
Please find your most recent results below. Tot chol & LDL are up. Vit is below normal, TSH is markedly up; Urine shows urinary tract infection. Resulted Orders METABOLIC PANEL, COMPREHENSIVE Result Value Ref Range Glucose 87 65 - 99 mg/dL BUN 23 8 - 27 mg/dL Creatinine 0.87 0.57 - 1.00 mg/dL GFR est non-AA 71 >59 mL/min/1.73 GFR est AA 81 >59 mL/min/1.73  
 BUN/Creatinine ratio 26 12 - 28 Sodium 140 134 - 144 mmol/L Potassium 4.4 3.5 - 5.2 mmol/L Chloride 106 96 - 106 mmol/L  
 CO2 22 20 - 29 mmol/L Calcium 9.7 8.7 - 10.3 mg/dL Protein, total 6.5 6.0 - 8.5 g/dL Albumin 4.5 3.8 - 4.8 g/dL GLOBULIN, TOTAL 2.0 1.5 - 4.5 g/dL A-G Ratio 2.3 (H) 1.2 - 2.2 Bilirubin, total 0.4 0.0 - 1.2 mg/dL Alk. phosphatase 82 39 - 117 IU/L  
 AST (SGOT) 14 0 - 40 IU/L  
 ALT (SGPT) 12 0 - 32 IU/L Narrative Performed at:  [address]  [id number] : Palomo Pickett MD, Phone:  [phone] CBC W/O DIFF Result Value Ref Range WBC 4.9 3.4 - 10.8 x10E3/uL  
 RBC 4.13 3.77 - 5.28 x10E6/uL HGB 12.9 11.1 - 15.9 g/dL HCT 38.5 34.0 - 46.6 % MCV 93 79 - 97 fL  
 MCH 31.2 26.6 - 33.0 pg  
 MCHC 33.5 31.5 - 35.7 g/dL  
 RDW 12.3 11.7 - 15.4 % PLATELET 606 495 - 384 x10E3/uL Narrative Performed at:  [address]  [id number] : Palomo Pickett MD, Phone:  [phone] LIPID PANEL Result Value Ref Range Cholesterol, total 216 (H) 100 - 199 mg/dL Triglyceride 129 0 - 149 mg/dL HDL Cholesterol 71 >39 mg/dL VLDL, calculated 22 5 - 40 mg/dL LDL, calculated 123 (H) 0 - 99 mg/dL Narrative Performed at:  [address]  [id number] : Palomo Pickett MD, Phone:  [phone] HEMOGLOBIN A1C WITH EAG  
Result Value Ref Range  
 Hemoglobin A1c 5.3 4.8 - 5.6 %  
 Estimated average glucose 105 mg/dL  
 Narrative  
 Performed at:  [address] [place] [address]  [id number] 
: Carolee Reyna MD, Phone:  [phone]  
TSH 3RD GENERATION  
Result Value Ref Range  
 TSH 7.540 (H) 0.450 - 4.500 uIU/mL  
 Narrative  
 Performed at:  [address] [place] [address]  [id number] 
: Carolee Reyna MD, Phone:  [phone]  
PROTHROMBIN TIME + INR  
Result Value Ref Range  
 INR 1.0 0.9 - 1.2  
 Prothrombin time 10.5 9.1 - 12.0 sec  
 Narrative  
 Performed at:  [address] [place] [address]  [id number] 
: Carolee Reyna MD, Phone:  [phone]  
VITAMIN D, 25 HYDROXY  
Result Value Ref Range  
 VITAMIN D, 25-HYDROXY 14.2 (L) 30.0 - 100.0 ng/mL  
 Narrative  
 Performed at:  [address] [place] [address]  [id number] 
: Carolee Reyna MD, Phone:  [phone]  
URINALYSIS W/ RFLX MICROSCOPIC  
Result Value Ref Range  
 Specific Gravity 1.027 1.005 - 1.030  
 pH (UA) 5.0 5.0 - 7.5  
 Color Yellow Yellow  
 Appearance Clear Clear  
 Leukocyte Esterase 2+ (A) Negative  
 Protein Negative Negative/Trace  
 Glucose Negative Negative  
 Ketone Negative Negative  
 Blood Negative Negative  
 Bilirubin Negative Negative  
 Urobilinogen 0.2 0.2 - 1.0 mg/dL  
 Nitrites Negative Negative  
 Microscopic Examination See additional order   
 Narrative  
 Performed at:  [address] [place] [address]  [id number] 
: Carolee Reyna MD, Phone:  [phone]  
MICROSCOPIC EXAMINATION  
Result Value Ref Range  
 WBC 6-10 (A) 0 - 5 /hpf  
 RBC 0-2 0 - 2 /hpf  
 Epithelial cells 0-10 0 - 10 /hpf  
 Casts None seen None seen /lpf  
 Bacteria Moderate (A) None seen/Few  
 Narrative  
 Performed at:  [address] [place] [address]  [id number]  : Chandler Astudillo MD, Phone:  [phone] RECOMMENDATIONS: 
Work on diet and exercise. Prescription for thyroid treatment, vit D, Continue with current  medications. Please call me if you have any questions: [phone] Sincerely, Dede Amos MD

## 2021-03-30 NOTE — PATIENT INSTRUCTIONS
Heart-Healthy Diet: Care Instructions  Your Care Instructions     A heart-healthy diet has lots of vegetables, fruits, nuts, beans, and whole grains, and is low in salt. It limits foods that are high in saturated fat, such as meats, cheeses, and fried foods. It may be hard to change your diet, but even small changes can lower your risk of heart attack and heart disease. Follow-up care is a key part of your treatment and safety. Be sure to make and go to all appointments, and call your doctor if you are having problems. It's also a good idea to know your test results and keep a list of the medicines you take. How can you care for yourself at home? Watch your portions  · Learn what a serving is. A \"serving\" and a \"portion\" are not always the same thing. Make sure that you are not eating larger portions than are recommended. For example, a serving of pasta is ½ cup. A serving size of meat is 2 to 3 ounces. A 3-ounce serving is about the size of a deck of cards. Measure serving sizes until you are good at [place]" them. Keep in mind that restaurants often serve portions that are 2 or 3 times the size of one serving. · To keep your energy level up and keep you from feeling hungry, eat often but in smaller portions. · Eat only the number of calories you need to stay at a healthy weight. If you need to lose weight, eat fewer calories than your body burns (through exercise and other physical activity). Eat more fruits and vegetables  · Eat a variety of fruit and vegetables every day. Dark green, deep orange, red, or yellow fruits and vegetables are especially good for you. Examples include spinach, carrots, peaches, and berries. · Keep carrots, celery, and other veggies handy for snacks. Buy fruit that is in season and store it where you can see it so that you will be tempted to eat it. · Cook dishes that have a lot of veggies in them, such as stir-fries and soups.   Limit saturated and trans fat  · Read food labels, and try to avoid saturated and trans fats. They increase your risk of heart disease. · Use olive or canola oil when you cook. · Bake, broil, grill, or steam foods instead of frying them. · Choose lean meats instead of high-fat meats such as hot dogs and sausages. Cut off all visible fat when you prepare meat. · Eat fish, skinless poultry, and meat alternatives such as soy products instead of high-fat meats. Soy products, such as tofu, may be especially good for your heart. · Choose low-fat or fat-free milk and dairy products. Eat foods high in fiber  · Eat a variety of grain products every day. Include whole-grain foods that have lots of fiber and nutrients. Examples of whole-grain foods include oats, whole wheat bread, and brown rice. · Buy whole-grain breads and cereals, instead of white bread or pastries. Limit salt and sodium  · Limit how much salt and sodium you eat to help lower your blood pressure. · Taste food before you salt it. Add only a little salt when you think you need it. With time, your taste buds will adjust to less salt. · Eat fewer snack items, fast foods, and other high-salt, processed foods. Check food labels for the amount of sodium in packaged foods. · Choose low-sodium versions of canned goods (such as soups, vegetables, and beans). Limit sugar  · Limit drinks and foods with added sugar. These include candy, desserts, and soda pop. Limit alcohol  · Limit alcohol to no more than 2 drinks a day for men and 1 drink a day for women. Too much alcohol can cause health problems. When should you call for help? Watch closely for changes in your health, and be sure to contact your doctor if:    · You would like help planning heart-healthy meals. Where can you learn more? Go to http://www.OQO.com/  Enter V137 in the search box to learn more about \"Heart-Healthy Diet: Care Instructions. \"  Current as of: August 22, 2019               Content Version: 12.6  © 3380-1392 [employer]. Care instructions adapted under license by [employer] (which disclaims liability or warranty for this information). If you have questions about a medical condition or this instruction, always ask your healthcare professional. [address] any warranty or liability for your use of this information.

## 2021-04-06 ENCOUNTER — TELEPHONE (OUTPATIENT)
Dept: NEUROLOGY | Age: 66
End: 2021-04-06

## 2021-04-06 NOTE — TELEPHONE ENCOUNTER
F/u appt made on Botox day of 4/15. Patient has not been in office since 2019. This will be a f/u appt to address future Botox.

## 2021-04-12 ENCOUNTER — OFFICE VISIT (OUTPATIENT)
Dept: FAMILY MEDICINE CLINIC | Age: 66
End: 2021-04-12

## 2021-04-12 ENCOUNTER — OFFICE VISIT (OUTPATIENT)
Dept: CARDIOLOGY CLINIC | Age: 66
End: 2021-04-12
Payer: MEDICAID

## 2021-04-12 VITALS
SYSTOLIC BLOOD PRESSURE: 118 MMHG | HEART RATE: 68 BPM | WEIGHT: 195.77 LBS | TEMPERATURE: 98.6 F | HEIGHT: 68 IN | DIASTOLIC BLOOD PRESSURE: 72 MMHG | OXYGEN SATURATION: 93 % | RESPIRATION RATE: 16 BRPM | BODY MASS INDEX: 29.67 KG/M2

## 2021-04-12 VITALS
HEART RATE: 68 BPM | OXYGEN SATURATION: 93 % | BODY MASS INDEX: 29.67 KG/M2 | SYSTOLIC BLOOD PRESSURE: 118 MMHG | DIASTOLIC BLOOD PRESSURE: 72 MMHG | WEIGHT: 195.8 LBS | HEIGHT: 68 IN | RESPIRATION RATE: 15 BRPM

## 2021-04-12 DIAGNOSIS — R07.89 ATYPICAL CHEST PAIN: Primary | ICD-10-CM

## 2021-04-12 DIAGNOSIS — Z01.818 PRE-OPERATIVE CLEARANCE: ICD-10-CM

## 2021-04-12 DIAGNOSIS — S83.401D SPRAIN OF COLLATERAL LIGAMENT OF RIGHT KNEE, SUBSEQUENT ENCOUNTER: ICD-10-CM

## 2021-04-12 DIAGNOSIS — R01.1 HEART MURMUR: ICD-10-CM

## 2021-04-12 DIAGNOSIS — E78.2 MIXED HYPERLIPIDEMIA: ICD-10-CM

## 2021-04-12 DIAGNOSIS — R06.09 DOE (DYSPNEA ON EXERTION): ICD-10-CM

## 2021-04-12 DIAGNOSIS — H53.8 BLURRED VISION, RIGHT EYE: Primary | ICD-10-CM

## 2021-04-12 DIAGNOSIS — S00.83XD FACIAL HEMATOMA, SUBSEQUENT ENCOUNTER: ICD-10-CM

## 2021-04-12 DIAGNOSIS — R55 SYNCOPE, UNSPECIFIED SYNCOPE TYPE: ICD-10-CM

## 2021-04-12 PROCEDURE — 99204 OFFICE O/P NEW MOD 45 MIN: CPT | Performed by: INTERNAL MEDICINE

## 2021-04-12 PROCEDURE — 99214 OFFICE O/P EST MOD 30 MIN: CPT | Performed by: INTERNAL MEDICINE

## 2021-04-12 PROCEDURE — 93000 ELECTROCARDIOGRAM COMPLETE: CPT | Performed by: INTERNAL MEDICINE

## 2021-04-12 RX ORDER — ACETAMINOPHEN AND CODEINE PHOSPHATE 300; 30 MG/1; MG/1
1 TABLET ORAL
Qty: 18 TAB | Refills: 0 | Status: SHIPPED | OUTPATIENT
Start: 2021-04-12 | End: 2022-06-06 | Stop reason: SDUPTHER

## 2021-04-12 RX ORDER — NALOXONE HYDROCHLORIDE 4 MG/.1ML
SPRAY NASAL
Qty: 2 EACH | Refills: 0 | Status: SHIPPED | OUTPATIENT
Start: 2021-04-12

## 2021-04-12 NOTE — PROGRESS NOTES
Chief Complaint   Patient presents with    Follow-up     ER Visit [facility] 4/10/21   1. Have you been to the ER, urgent care clinic since your last visit? Hospitalized since your last visit? Yes Where: [facility]    2. Have you seen or consulted any other health care providers outside of the [address] [place] [address] since your last visit? Include any pap smears or colon screening. No    Seen in ER [facility] xrays and CT scan negative according to ER Anitha Morris) Will fax over discharge summary. Contusion face and knee .  Referred to Ortho and Opthamalogy  Facial bruising

## 2021-04-12 NOTE — PATIENT INSTRUCTIONS
Knee Sprain: Care Instructions  Your Care Instructions     A knee sprain is one or more stretched, partly torn, or completely torn knee ligaments. Ligaments are bands of ropelike tissue that connect bone to bone and make the knee stable. The knee has four main ligaments. Knee sprains often happen because of a twisting or bending injury from sports such as skiing, basketball, soccer, or football. The knee turns one way while the lower or upper leg goes another way. A sprain also can happen when the knee is hit from the side or the front. If a knee ligament is slightly stretched, you will probably need only home treatment. You may need a splint or brace (immobilizer) for a partly torn ligament. A complete tear may need surgery. A minor knee sprain may take up to 6 weeks to heal, while a severe sprain may take months. Follow-up care is a key part of your treatment and safety. Be sure to make and go to all appointments, and call your doctor if you are having problems. It's also a good idea to know your test results and keep a list of the medicines you take. How can you care for yourself at home? · Follow instructions about how much weight you can put on your leg and how to walk with crutches. · Prop up your leg on a pillow when you ice it or anytime you sit or lie down for the next 3 days. Try to keep it above the level of your heart. This will help reduce swelling. · Put ice or a cold pack on your knee for 10 to 20 minutes at a time. Try to do this every 1 to 2 hours for the next 3 days (when you are awake) or until the swelling goes down. Put a thin cloth between the ice and your skin. Do not get the splint wet. · If you have an elastic bandage, make sure it is snug but not so tight that your leg is numb, tingles, or swells below the bandage. You can loosen the bandage if it is too tight. · Your doctor may recommend a brace (immobilizer) to support your knee while it heals. Wear it as directed.   · Ask your doctor if you can take an over-the-counter pain medicine, such as acetaminophen (Tylenol), ibuprofen (Advil, Motrin), or naproxen (Aleve). Be safe with medicines. Read and follow all instructions on the label. When should you call for help? Call 911 anytime you think you may need emergency care. For example, call if:    · You have sudden chest pain and shortness of breath, or you cough up blood. Call your doctor now or seek immediate medical care if:    · You have increased or severe pain.     · You cannot move your toes or ankle.     · Your foot is cool or pale or changes color.     · You have tingling, weakness, or numbness in your foot or leg.     · Your splint or brace feels too tight.     · You are unable to straighten the knee, or the knee \"locks. \"     · You have signs of a blood clot in your leg, such as:  ? Pain in your calf, back of the knee, thigh, or groin. ? Redness and swelling in your leg. Watch closely for changes in your health, and be sure to contact your doctor if:    · Your pain is not getting better or is getting worse. Where can you learn more? Go to http://www.gray.com/  Enter N406 in the search box to learn more about \"Knee Sprain: Care Instructions. \"  Current as of: November 16, 2020               Content Version: 12.8  © 0253-2458 [employer]. Care instructions adapted under license by [employer] (which disclaims liability or warranty for this information). If you have questions about a medical condition or this instruction, always ask your healthcare professional. [place] [address] any warranty or liability for your use of this information.

## 2021-04-12 NOTE — PROGRESS NOTES
Chief Complaint   Patient presents with    Follow-up     ER Visit [facility] 4/10/21     HPI:  Estelle Quiroz is a 60 y.o.  female with medical history below presents for ER follow-up. (ER Visit [facility] 4/10/21)  Patient was seen in [address] [place] [address] for contusion of face and knee following a fall. X-rays and CT scan head were negative according to ER Norma Sprague). Will fax over discharge summary. Needing referred to Ortho and Opthalmology. Patient was evaluated by  Cardiologist prior to this visit. Review of Systems  As per hpi    Past Medical History:   Diagnosis Date    Anxiety disorder     Breast cancer ([facility] [address]     right lumpectomy    Concussion     Depression     Headache     Murmur     Psychotic disorder ([facility] [address]     Radiation therapy complication     late 2997 or early 2012 for radiation     Past Surgical History:   Procedure Laterality Date    HX BREAST BIOPSY Left     benign 2011    HX BREAST LUMPECTOMY Right     2011    HX HYSTERECTOMY      HX ROTATOR CUFF REPAIR       Social History     Socioeconomic History    Marital status: SINGLE     Spouse name: Not on file    Number of children: Not on file    Years of education: Not on file    Highest education level: Not on file   Tobacco Use    Smoking status: Never Smoker    Smokeless tobacco: Never Used   Substance and Sexual Activity    Alcohol use: Yes     Frequency: Monthly or less    Drug use: Never    Sexual activity: Not Currently     Family History   Adopted: Yes   Problem Relation Age of Onset    No Known Problems Mother     No Known Problems Father      Current Outpatient Medications   Medication Sig Dispense Refill    cholecalciferol (VITAMIN D3) (5000 Units /125 mcg) capsule Take 1 Cap by mouth daily. 90 Cap 1    levothyroxine (SYNTHROID) 125 mcg tablet Take 1 Tab by mouth Daily (before breakfast). 90 Tab 1    onabotulinumtoxinA (BOTOX) 200 unit injection 200 units by IM route once every 12 weeks.  Inject IM neck/face, 31 FDA approved sites. Indication: Migraine prevention. DX code: G43.71 1 Vial 3    traZODone (DESYREL) 300 mg tablet Take 300 mg by mouth nightly.  topiramate (TOPAMAX) 100 mg tablet TAKE ONE TABLET BY MOUTH TWICE DAILY 60 Tab 3    buPROPion SR (WELLBUTRIN SR) 150 mg SR tablet TAKE ONE TABLET BY MOUTH TWICE DAILY (Patient taking differently: 300 mg.) 60 Tab 3    diazePAM (VALIUM) 10 mg tablet Take 10 mg by mouth every six (6) hours as needed for Anxiety. Allergies   Allergen Reactions    Motrin [Ibuprofen] Unknown (comments)     ulcers       Objective:  Visit Vitals  /72   Pulse 68   Temp 98.6 °F (37 °C) (Temporal)   Resp 16   Ht 5' 8\" (1.727 m)   Wt 195 lb 12.3 oz (88.8 kg)   SpO2 93%   BMI 29.77 kg/m²     Physical Exam:   General appearance - black eyes, R>L,alert, in moderate distress  Mental status - alert, oriented to person, place, and time  EYE-, swelling over riorbit  Neck - supple, no significant adenopathy   Chest - clear to auscultation, no wheezes, rales or rhonchi  Heart - normal rate, regular rhythm, no murmurs  Abdomen - soft, nontender, nondistended, no masses or organomegaly  Knee-antalgic gait, tenderness over right knee and swelling    Results for orders placed or performed in visit on 38/42/90   METABOLIC PANEL, COMPREHENSIVE   Result Value Ref Range    Glucose 87 65 - 99 mg/dL    BUN 23 8 - 27 mg/dL    Creatinine 0.87 0.57 - 1.00 mg/dL    GFR est non-AA 71 >59 mL/min/1.73    GFR est AA 81 >59 mL/min/1.73    BUN/Creatinine ratio 26 12 - 28    Sodium 140 134 - 144 mmol/L    Potassium 4.4 3.5 - 5.2 mmol/L    Chloride 106 96 - 106 mmol/L    CO2 22 20 - 29 mmol/L    Calcium 9.7 8.7 - 10.3 mg/dL    Protein, total 6.5 6.0 - 8.5 g/dL    Albumin 4.5 3.8 - 4.8 g/dL    GLOBULIN, TOTAL 2.0 1.5 - 4.5 g/dL    A-G Ratio 2.3 (H) 1.2 - 2.2    Bilirubin, total 0.4 0.0 - 1.2 mg/dL    Alk.  phosphatase 82 39 - 117 IU/L    AST (SGOT) 14 0 - 40 IU/L    ALT (SGPT) 12 0 - 32 IU/L   CBC W/O DIFF   Result Value Ref Range    WBC 4.9 3.4 - 10.8 x10E3/uL    RBC 4.13 3.77 - 5.28 x10E6/uL    HGB 12.9 11.1 - 15.9 g/dL    HCT 38.5 34.0 - 46.6 %    MCV 93 79 - 97 fL    MCH 31.2 26.6 - 33.0 pg    MCHC 33.5 31.5 - 35.7 g/dL    RDW 12.3 11.7 - 15.4 %    PLATELET 526 349 - 128 x10E3/uL   LIPID PANEL   Result Value Ref Range    Cholesterol, total 216 (H) 100 - 199 mg/dL    Triglyceride 129 0 - 149 mg/dL    HDL Cholesterol 71 >39 mg/dL    VLDL, calculated 22 5 - 40 mg/dL    LDL, calculated 123 (H) 0 - 99 mg/dL   HEMOGLOBIN A1C WITH EAG   Result Value Ref Range    Hemoglobin A1c 5.3 4.8 - 5.6 %    Estimated average glucose 105 mg/dL   TSH 3RD GENERATION   Result Value Ref Range    TSH 7.540 (H) 0.450 - 4.500 uIU/mL   PROTHROMBIN TIME + INR   Result Value Ref Range    INR 1.0 0.9 - 1.2    Prothrombin time 10.5 9.1 - 12.0 sec   VITAMIN D, 25 HYDROXY   Result Value Ref Range    VITAMIN D, 25-HYDROXY 14.2 (L) 30.0 - 100.0 ng/mL   URINALYSIS W/ RFLX MICROSCOPIC   Result Value Ref Range    Specific Gravity 1.027 1.005 - 1.030    pH (UA) 5.0 5.0 - 7.5    Color Yellow Yellow    Appearance Clear Clear    Leukocyte Esterase 2+ (A) Negative    Protein Negative Negative/Trace    Glucose Negative Negative    Ketone Negative Negative    Blood Negative Negative    Bilirubin Negative Negative    Urobilinogen 0.2 0.2 - 1.0 mg/dL    Nitrites Negative Negative    Microscopic Examination See additional order    MICROSCOPIC EXAMINATION   Result Value Ref Range    WBC 6-10 (A) 0 - 5 /hpf    RBC 0-2 0 - 2 /hpf    Epithelial cells 0-10 0 - 10 /hpf    Casts None seen None seen /lpf    Bacteria Moderate (A) None seen/Few     Assessment/Plan:  Diagnoses and all orders for this visit:    Blurred vision, right eye    Facial hematoma, subsequent encounter  -     REFERRAL TO OPHTHALMOLOGY    Sprain of collateral ligament of right knee, subsequent encounter  -     REFERRAL TO ORTHOPEDIC SURGERY  -     acetaminophen-codeine (Tylenol-Codeine #3) 300-30 mg per tablet; Take 1 Tab by mouth every four (4) hours as needed for Pain for up to 3 days. Max Daily Amount: 6 Tabs., Normal, Disp-18 Tab, R-0  -     naloxone (NARCAN) 4 mg/actuation nasal spray; Use 1 spray intranasally, then discard. Repeat with new spray every 2 min as needed for opioid overdose symptoms, alternating nostrils. , Normal, Disp-2 Each, R-0      Patient Instructions          Knee Sprain: Care Instructions  Your Care Instructions     A knee sprain is one or more stretched, partly torn, or completely torn knee ligaments. Ligaments are bands of ropelike tissue that connect bone to bone and make the knee stable. The knee has four main ligaments. Knee sprains often happen because of a twisting or bending injury from sports such as skiing, basketball, soccer, or football. The knee turns one way while the lower or upper leg goes another way. A sprain also can happen when the knee is hit from the side or the front. If a knee ligament is slightly stretched, you will probably need only home treatment. You may need a splint or brace (immobilizer) for a partly torn ligament. A complete tear may need surgery. A minor knee sprain may take up to 6 weeks to heal, while a severe sprain may take months. Follow-up care is a key part of your treatment and safety. Be sure to make and go to all appointments, and call your doctor if you are having problems. It's also a good idea to know your test results and keep a list of the medicines you take. How can you care for yourself at home? · Follow instructions about how much weight you can put on your leg and how to walk with crutches. · Prop up your leg on a pillow when you ice it or anytime you sit or lie down for the next 3 days. Try to keep it above the level of your heart. This will help reduce swelling. · Put ice or a cold pack on your knee for 10 to 20 minutes at a time.  Try to do this every 1 to 2 hours for the next 3 days (when you are awake) or until the swelling goes down. Put a thin cloth between the ice and your skin. Do not get the splint wet. · If you have an elastic bandage, make sure it is snug but not so tight that your leg is numb, tingles, or swells below the bandage. You can loosen the bandage if it is too tight. · Your doctor may recommend a brace (immobilizer) to support your knee while it heals. Wear it as directed. · Ask your doctor if you can take an over-the-counter pain medicine, such as acetaminophen (Tylenol), ibuprofen (Advil, Motrin), or naproxen (Aleve). Be safe with medicines. Read and follow all instructions on the label. When should you call for help? Call 911 anytime you think you may need emergency care. For example, call if:    · You have sudden chest pain and shortness of breath, or you cough up blood. Call your doctor now or seek immediate medical care if:    · You have increased or severe pain.     · You cannot move your toes or ankle.     · Your foot is cool or pale or changes color.     · You have tingling, weakness, or numbness in your foot or leg.     · Your splint or brace feels too tight.     · You are unable to straighten the knee, or the knee \"locks. \"     · You have signs of a blood clot in your leg, such as:  ? Pain in your calf, back of the knee, thigh, or groin. ? Redness and swelling in your leg. Watch closely for changes in your health, and be sure to contact your doctor if:    · Your pain is not getting better or is getting worse. Where can you learn more? Go to http://www.gray.com/  Enter N406 in the search box to learn more about \"Knee Sprain: Care Instructions. \"  Current as of: November 16, 2020               Content Version: 12.8  © 7303-4677 [employer]. Care instructions adapted under license by [employer] (which disclaims liability or warranty for this information).  If you have questions about a medical condition or this instruction, always ask your healthcare professional. [place] [address] any warranty or liability for your use of this information. Follow-up and Dispositions    · Return if symptoms worsen or fail to improve, for keep routine appointment.

## 2021-04-12 NOTE — PROGRESS NOTES
1. Have you been to the ER, urgent care clinic since your last visit? Hospitalized since your last visit? Seen at [facility] yesterday for a fall. 2. Have you seen or consulted any other health care providers outside of the [address] [place] [address] since your last visit? Include any pap smears or colon screening. Seen at [facility] yesterday. Seen by PCP.       Chief Complaint   Patient presents with    New Patient     referred by PCP for sob and heart murmur and abn EKG- chest pain on lf side by breast    Surgical Clearance      on RT knee- not date set yet

## 2021-04-12 NOTE — PROGRESS NOTES
[address], [address] [place] [address] Ne, [address] [facility]  [phone]     Subjective:      Leigh San is a 60 y.o. female is here for new patient consultation: abnormal EKG and cardiac murmur. She is also seeking cardiac clearance for pending left knee surgery with Dr Ainsley Flores, date to be determined. She has pmhx HLD, hypothyroidism, migraine, panic d/o / bipolar d/o and right breast cancer s/p lumpectomy/radiation. Denies hx smoking/ illegal drug use. Occasionally drinks alcohol. UNKNOWN family hx. Symptoms wise, she reports intermittent left sided sharp stabbing pain, lasts less than a minute and goes away. She also reports sob even with mild activity  Such as walking from her bedroom to bathroom. Also reports syncopal episode last Tuesday--she was at a restaurant, had a glass of wine with dinner,  Next thing she knows she was already home. No recollection of the event. She went to [address] [place] [address] Friday to evaluate her facial bruising, no records to review. She had 3 falls this year but only syncopal event was last Tuesday. No palpitation/dizziness prior to event. The patient denies orthopnea, PND, LE edema, palpitations. ECHO 7/19  · Left Ventricle: Normal cavity size, systolic function (ejection fraction normal) and diastolic function. Mild concentric hypertrophy. Estimated left ventricular ejection fraction is 56 - 60%. · Mitral Valve: Trace mitral valve regurgitation. · Tricuspid Valve: Mild tricuspid valve regurgitation is present.     Patient Active Problem List    Diagnosis Date Noted    Mixed hyperlipidemia 04/12/2021    Cyst of Bartholin's gland duct 03/30/2021    Hypersomnia 03/30/2021    Irritable bowel syndrome 03/30/2021    Panic disorder 03/30/2021    Attention deficit disorder 03/23/2021    Borderline personality disorder ([facility] [address] 03/23/2021    Posttraumatic stress disorder 03/23/2021    Anxiety 06/19/2018    [facility] 06/19/2018    History of malignant neoplasm of breast 06/19/2018    Hypothyroidism 06/19/2018    Migraine 06/19/2018      Mariluz Yanez MD  Past Medical History:   Diagnosis Date    Anxiety disorder     Breast cancer ([facility] [address]     right lumpectomy    Concussion     Depression     Headache     Murmur     Psychotic disorder ([facility]     Radiation therapy complication     late 9979 or early 2012 for radiation      Past Surgical History:   Procedure Laterality Date    HX BREAST BIOPSY Left     benign 2011    HX BREAST LUMPECTOMY Right     2011    HX HYSTERECTOMY      HX ROTATOR CUFF REPAIR       Allergies   Allergen Reactions    Motrin [Ibuprofen] Unknown (comments)     ulcers      Family History   Adopted: Yes   Problem Relation Age of Onset    No Known Problems Mother     No Known Problems Father       Social History     Socioeconomic History    Marital status: SINGLE     Spouse name: Not on file    Number of children: Not on file    Years of education: Not on file    Highest education level: Not on file   Occupational History    Not on file   Social Needs    Financial resource strain: Not on file    Food insecurity     Worry: Not on file     Inability: Not on file    Transportation needs     Medical: Not on file     Non-medical: Not on file   Tobacco Use    Smoking status: Never Smoker    Smokeless tobacco: Never Used   Substance and Sexual Activity    Alcohol use: Yes     Frequency: Monthly or less    Drug use: Never    Sexual activity: Not Currently   Lifestyle    Physical activity     Days per week: Not on file     Minutes per session: Not on file    Stress: Not on file   Relationships    Social connections     Talks on phone: Not on file     Gets together: Not on file     Attends Latter-day service: Not on file     Active member of club or organization: Not on file     Attends meetings of clubs or organizations: Not on file     Relationship status: Not on file    Intimate partner violence     Fear of current or ex partner: Not on file     Emotionally abused: Not on file     Physically abused: Not on file     Forced sexual activity: Not on file   Other Topics Concern    Not on file   Social History Narrative    Not on file      Current Outpatient Medications   Medication Sig    cholecalciferol (VITAMIN D3) (5000 Units /125 mcg) capsule Take 1 Cap by mouth daily.  levothyroxine (SYNTHROID) 125 mcg tablet Take 1 Tab by mouth Daily (before breakfast).  influenza vaccine 2017-18, 4 yrs+,,PF, (FLUCELVAX QUAD) syrg injection Flucelvax Quad 5927-4231 (PF) 60 mcg (15 mcg x 4)/0.5 mL IM syringe   TO BE ADMINISTERED BY PHARMACIST    onabotulinumtoxinA (BOTOX) 200 unit injection 200 units by IM route once every 12 weeks. Inject IM neck/face, 31 FDA approved sites. Indication: Migraine prevention. DX code: G43.71    traZODone (DESYREL) 300 mg tablet Take 300 mg by mouth nightly.  topiramate (TOPAMAX) 100 mg tablet TAKE ONE TABLET BY MOUTH TWICE DAILY    buPROPion SR (WELLBUTRIN SR) 150 mg SR tablet TAKE ONE TABLET BY MOUTH TWICE DAILY (Patient taking differently: 300 mg.)    diazePAM (VALIUM) 10 mg tablet Take 10 mg by mouth every six (6) hours as needed for Anxiety.  vilazodone (VIIBRYD) 40 mg tab tablet Take  by mouth daily. No current facility-administered medications for this visit. Review of Symptoms:  11 systems reviewed, negative other than as stated in the HPI    Physical ExamPhysical Exam:    Vitals:    04/12/21 0944   BP: 118/72   Pulse: 68   Resp: 15   SpO2: 93%   Weight: 195 lb 12.8 oz (88.8 kg)   Height: 5' 8\" (1.727 m)     Body mass index is 29.77 kg/m². General PE  Gen:  Diffused facial ecchymosis  Mental Status - Alert. General Appearance - Not in acute distress. HEENT:  PERRL, no carotid bruits or JVD  Chest and Lung Exam   Inspection: Accessory muscles - No use of accessory muscles in breathing.    Auscultation:   Breath sounds: - Normal.   Cardiovascular   Inspection: Jugular vein - Bilateral - Inspection Normal.   Palpation/Percussion:   Apical Impulse: - Normal.   Auscultation: Rhythm - Regular. Heart Sounds - S1 WNL and S2 WNL. No S3 or S4. Murmurs & Other Heart Sounds: Auscultation of the heart reveals - No Murmurs. Peripheral Vascular   Upper Extremity: Inspection - Bilateral - No Cyanotic nailbeds or Digital clubbing. Lower Extremity:   Palpation: Edema - Bilateral - No edema. Abdomen:   Soft, non-tender, bowel sounds are active. Neuro: A&O times 3, CN and motor grossly WNL    Labs:   Lab Results   Component Value Date/Time    Cholesterol, total 216 (H) 03/26/2021 01:41 PM    Cholesterol, total 180 06/24/2019 03:32 PM    Cholesterol, total 195 12/27/2016 09:09 AM    HDL Cholesterol 71 03/26/2021 01:41 PM    HDL Cholesterol 60 06/24/2019 03:32 PM    HDL Cholesterol 78 12/27/2016 09:09 AM    LDL, calculated 123 (H) 03/26/2021 01:41 PM    LDL, calculated 93 06/24/2019 03:32 PM    LDL, calculated 94.2 12/27/2016 09:09 AM    Triglyceride 129 03/26/2021 01:41 PM    Triglyceride 135 06/24/2019 03:32 PM    Triglyceride 114 12/27/2016 09:09 AM    CHOL/HDL Ratio 2.5 12/27/2016 09:09 AM     No results found for: CPK, CPKX, CPX  Lab Results   Component Value Date/Time    Sodium 140 03/26/2021 01:41 PM    Potassium 4.4 03/26/2021 01:41 PM    Chloride 106 03/26/2021 01:41 PM    CO2 22 03/26/2021 01:41 PM    Anion gap 9 02/15/2017 02:24 PM    Glucose 87 03/26/2021 01:41 PM    BUN 23 03/26/2021 01:41 PM    Creatinine 0.87 03/26/2021 01:41 PM    BUN/Creatinine ratio 26 03/26/2021 01:41 PM    GFR est AA 81 03/26/2021 01:41 PM    GFR est non-AA 71 03/26/2021 01:41 PM    Calcium 9.7 03/26/2021 01:41 PM    Bilirubin, total 0.4 03/26/2021 01:41 PM    Alk.  phosphatase 82 03/26/2021 01:41 PM    Protein, total 6.5 03/26/2021 01:41 PM    Albumin 4.5 03/26/2021 01:41 PM    Globulin 2.6 12/27/2016 09:09 AM    A-G Ratio 2.3 (H) 03/26/2021 01:41 PM    ALT (SGPT) 12 03/26/2021 01:41 PM       EKG:  SR Assessment:     Assessment:      1. Atypical chest pain    2. Mixed hyperlipidemia    3. Pre-operative clearance    4. Heart murmur    5. KEVIN (dyspnea on exertion)    6. Syncope, unspecified syncope type        Orders Placed This Encounter    AMB POC EKG ROUTINE W/ 12 LEADS, INTER & REP     Order Specific Question:   Reason for Exam:     Answer:   routine        Plan:     1. Cardiac murmur/kevin/syncope  Echo 7/19 noted. Will repeat echo, obtain 1 mos event    2. Abn EKG/Atypical cp/kevin  Obtain Flower    3. BP controlled, not on any pharmacotherapy    4. HLD  3/2021  NOT on statin    5. Pre-op clearance for left knee repain, pending until testing complete    Continue current care and f/u     Annabelle Lei NP       Patient seen and examined by me with nurse practitioner. I personally performed all components of the history, physical, and medical decision making and agree with the assessment and plan as noted. Syncope most likely due to combination of fall secondary to knee pathology. Per pt she just had once glass of wine. Not driving due to previous h/o DUI. Work up as above. No murmur on exam today. D/w patient.      Anjelica Fernandes MD

## 2021-04-15 ENCOUNTER — OFFICE VISIT (OUTPATIENT)
Dept: NEUROLOGY | Age: 66
End: 2021-04-15
Payer: MEDICAID

## 2021-04-15 VITALS
HEIGHT: 69 IN | OXYGEN SATURATION: 95 % | BODY MASS INDEX: 28.14 KG/M2 | WEIGHT: 190 LBS | DIASTOLIC BLOOD PRESSURE: 72 MMHG | SYSTOLIC BLOOD PRESSURE: 112 MMHG | HEART RATE: 61 BPM | RESPIRATION RATE: 16 BRPM | TEMPERATURE: 97.8 F

## 2021-04-15 DIAGNOSIS — G43.711 INTRACTABLE CHRONIC MIGRAINE WITHOUT AURA AND WITH STATUS MIGRAINOSUS: Primary | ICD-10-CM

## 2021-04-15 PROCEDURE — 64615 CHEMODENERV MUSC MIGRAINE: CPT | Performed by: PSYCHIATRY & NEUROLOGY

## 2021-04-15 NOTE — PROGRESS NOTES
Chief Complaint   Patient presents with    Follow-up     patient present to discuss botox.   she has an appointment with dermatology tomorrow to discuss if hematoma can be drained,

## 2021-04-17 NOTE — PROCEDURES
Procedure: Chemodenervation for Chronic Intractable Migraines    After consenting the patient and discussing the procedure and possible side effects. The chemodenervant was reconstituted as follows:  200 units of botox was mixed with 4ml of perservative free saline and withdrawn, using a into four sterile 1ml BD syringes. Sterile 30 gauge half inch needles were affixed to the syringes. Procedure   Chemodenervant was injected into the following muscles which were identified using their anatomical land marks. Each site was aspirated prior to injection to ensure that there was no vascular compromise. Muscle Units/inj No. Of injections Total   Trapezius BL 5 U 3each 30 units   Cervical Paraspinals BL 5 U 2 each 20 units   Occipitalis BL 5 U 3 each 30 units   Temporalis BL 5 U 4 each 40 units    BL 5 U 1 each 10 units   Procerus 5 U 1 5 units   Frontalis BL 5 U 2 each  20 units         Total units  Waste 35 units 155     Nominal bleeding at injection sites. Patient tolerated the procedure well. No reported pain following the procedure.

## 2021-04-22 ENCOUNTER — TELEPHONE (OUTPATIENT)
Dept: NEUROLOGY | Age: 66
End: 2021-04-22

## 2021-04-22 NOTE — TELEPHONE ENCOUNTER
Patient states that she did see the orthopedic doctor that  recommended.  The doctor would like to know if pt can have some test run on her due to the fall she had a few weeks ago

## 2021-04-23 NOTE — TELEPHONE ENCOUNTER
Are you asking me to run a test on her or if I agree that she have a test run her by the orthopedic doctor?

## 2021-04-26 ENCOUNTER — TELEPHONE (OUTPATIENT)
Dept: NEUROLOGY | Age: 66
End: 2021-04-26

## 2021-04-26 NOTE — TELEPHONE ENCOUNTER
Pt called in asking if she needs more testing on her head. Pt stated she had a severe fall and has a bump the size of a baseball, she's already saw the ortho doctor and they are redirecting here, pt would like to be advised on next steps. The ER has been reaching out to her and she's confused on who to see and where to go. PT can be reached at [phone].   [facility]

## 2021-04-27 ENCOUNTER — OFFICE VISIT (OUTPATIENT)
Dept: NEUROLOGY | Age: 66
End: 2021-04-27

## 2021-04-27 ENCOUNTER — OFFICE VISIT (OUTPATIENT)
Dept: ENDOCRINOLOGY | Age: 66
End: 2021-04-27
Payer: MEDICAID

## 2021-04-27 VITALS
OXYGEN SATURATION: 96 % | HEART RATE: 68 BPM | TEMPERATURE: 98.4 F | BODY MASS INDEX: 27.28 KG/M2 | WEIGHT: 180 LBS | SYSTOLIC BLOOD PRESSURE: 154 MMHG | DIASTOLIC BLOOD PRESSURE: 4 MMHG | HEIGHT: 68 IN

## 2021-04-27 VITALS
HEART RATE: 63 BPM | WEIGHT: 195 LBS | RESPIRATION RATE: 14 BRPM | SYSTOLIC BLOOD PRESSURE: 150 MMHG | BODY MASS INDEX: 29.65 KG/M2 | DIASTOLIC BLOOD PRESSURE: 80 MMHG

## 2021-04-27 DIAGNOSIS — G43.711 INTRACTABLE CHRONIC MIGRAINE WITHOUT AURA AND WITH STATUS MIGRAINOSUS: ICD-10-CM

## 2021-04-27 DIAGNOSIS — E03.9 ACQUIRED HYPOTHYROIDISM: ICD-10-CM

## 2021-04-27 DIAGNOSIS — F31.75 BIPOLAR I DISORDER, IN PARTIAL REMISSION (HCC): ICD-10-CM

## 2021-04-27 DIAGNOSIS — F07.81 POSTCONCUSSION SYNDROME: Primary | ICD-10-CM

## 2021-04-27 DIAGNOSIS — E03.9 ACQUIRED HYPOTHYROIDISM: Primary | ICD-10-CM

## 2021-04-27 PROCEDURE — 99214 OFFICE O/P EST MOD 30 MIN: CPT | Performed by: PSYCHIATRY & NEUROLOGY

## 2021-04-27 PROCEDURE — 99204 OFFICE O/P NEW MOD 45 MIN: CPT | Performed by: INTERNAL MEDICINE

## 2021-04-27 RX ORDER — LEVOTHYROXINE SODIUM 150 UG/1
150 TABLET ORAL
Qty: 30 TAB | Refills: 3 | Status: SHIPPED | OUTPATIENT
Start: 2021-04-27 | End: 2021-11-29 | Stop reason: SDUPTHER

## 2021-04-27 RX ORDER — ASPIRIN 81 MG/1
TABLET ORAL DAILY
COMMUNITY
End: 2021-07-28

## 2021-04-27 NOTE — PROGRESS NOTES
Chief Complaint   Patient presents with    Thyroid Problem     pcp and pharmacy verified. History of Present Illness: Margi Kim is a 60 y.o. female who I was asked to see in consult for hypothyroidism. \"I was seeing another endocrinologist Dr. Bhavna Toledo but she does not take my insurance any longer, so I have not seen her in 3-4 years. I will request records from Dr. Bhavna Toledo. I had been taking lithium in the past, for bipolar d/0 and it killed my thyroid. Pt is currently taking LT4 125mcg daily. \"I have been on this dose for a long time, but I am not sure this dose is correct any longer. I used to be on 175mcg in the past.  She has been on LT4 since 2005. Pt is also seeing Dr. Shaneka Haq of Neurology for issues of migraines. Pt's TSH was drawn in March 2021 and it was 7.5. She takes her LT4 125mcg in the morning with her welbutrin, topamax and vitamin D. She will take these with water, but does not wait to eat. Pt notes she is trying to lose weight and is only eating 2 meals per day. She notes she was leaving a restaurant and fell and broke her right knee, hit her head and had a concussion and tore her right rotator cuff. Because of this she has not been able to work or exercise. She has follow up with Luci Allison of [facility]. She denies issues of palpitations, or CP, she notes she has had issues of KEVIN, but she saw Dr. Roger Hollis said my heart was fine\". She denies issues of tremors, heat or cold intolerance, diarrhea or constipation. She was having some constipation when she was on T#3, but when she stopped, the constipation stopped. She denies issues of dysphagia, dysphonia or chocking issues. Pt is adopted, she does not know any family hx. Pt has hx of breast cancer in 2011, which was treated with lumpectomy and XRT and she was on Letrozole for 5 years.     Past Medical History:   Diagnosis Date    Anxiety disorder     Breast cancer ([facility] [address]     right lumpectomy    Concussion     Depression  Headache     Murmur     [facility] ([facility]     Radiation therapy complication     late 2622 or early 2012 for radiation     Past Surgical History:   Procedure Laterality Date    HX BREAST BIOPSY Left     benign 2011    HX BREAST LUMPECTOMY Right     2011    HX HYSTERECTOMY      HX ROTATOR CUFF REPAIR       Current Outpatient Medications   Medication Sig    aspirin delayed-release 81 mg tablet Take  by mouth daily. Not started yet    levothyroxine (SYNTHROID) 150 mcg tablet Take 1 Tab by mouth Daily (before breakfast).  naloxone (NARCAN) 4 mg/actuation nasal spray Use 1 spray intranasally, then discard. Repeat with new spray every 2 min as needed for opioid overdose symptoms, alternating nostrils.  cholecalciferol (VITAMIN D3) (5000 Units /125 mcg) capsule Take 1 Cap by mouth daily.  onabotulinumtoxinA (BOTOX) 200 unit injection 200 units by IM route once every 12 weeks. Inject IM neck/face, 31 FDA approved sites. Indication: Migraine prevention. DX code: G43.71    traZODone (DESYREL) 300 mg tablet Take 300 mg by mouth nightly.  topiramate (TOPAMAX) 100 mg tablet TAKE ONE TABLET BY MOUTH TWICE DAILY    buPROPion SR (WELLBUTRIN SR) 150 mg SR tablet TAKE ONE TABLET BY MOUTH TWICE DAILY (Patient taking differently: 300 mg. Takes 2 tabs in AM)    diazePAM (VALIUM) 10 mg tablet Take 10 mg by mouth every six (6) hours as needed for Anxiety. No current facility-administered medications for this visit.       Allergies   Allergen Reactions    Motrin [Ibuprofen] Unknown (comments)     ulcers     Family History   Adopted: Yes   Problem Relation Age of Onset    Alcohol abuse Mother      Social History     Socioeconomic History    Marital status: SINGLE     Spouse name: Not on file    Number of children: Not on file    Years of education: Not on file    Highest education level: Not on file   Occupational History    Not on file   Social Needs    Financial resource strain: Not on file  Food insecurity     Worry: Not on file     Inability: Not on file    Transportation needs     Medical: Not on file     Non-medical: Not on file   Tobacco Use    Smoking status: Never Smoker    Smokeless tobacco: Never Used   Substance and Sexual Activity    Alcohol use: Yes     Frequency: Monthly or less     Comment: social    Drug use: Not Currently     Types: Cocaine     Comment: \"back in the day\"    Sexual activity: Not Currently   Lifestyle    Physical activity     Days per week: Not on file     Minutes per session: Not on file    Stress: Not on file   Relationships    Social connections     Talks on phone: Not on file     Gets together: Not on file     Attends Taoist service: Not on file     Active member of club or organization: Not on file     Attends meetings of clubs or organizations: Not on file     Relationship status: Not on file    Intimate partner violence     Fear of current or ex partner: Not on file     Emotionally abused: Not on file     Physically abused: Not on file     Forced sexual activity: Not on file   Other Topics Concern    Not on file   Social History Narrative    Not on file     Review of Systems:  - Constitutional Symptoms: no fevers, chills, weight loss  - Eyes: no blurry vision or double vision  - Cardiovascular: no chest pain or palpitations  - Respiratory: no cough or shortness of breath  - Gastrointestinal: no dysphagia or abdominal pain  - Musculoskeletal: right leg pain  - Integumentary: no rashes  - Neurological: + HAs  - Psychiatric: no depression or anxiety  - Endocrine: no heat or cold intolerance, no polyuria or polydipsia    Physical Examination:  Blood pressure (!) 150/80, pulse 63, resp. rate 14, weight 195 lb (88.5 kg).   - General: pleasant, no distress, good eye contact  - HEENT: no exopthalmos, no periorbital edema, no scleral/conjunctival injection, EOMI, no lid lag or stare  - Neck: supple, no thyromegaly, masses, lymph nodes, or carotid bruits, no supraclavicular or dorsocervical fat pads  - Cardiovascular: regular, normal rate, normal S1 and S2, no murmurs/rubs/gallops, 2+ dorsalis pedis pulses bilaterally  - Respiratory: clear to auscultation bilaterally  - Gastrointestinal: soft, nontender, nondistended, no masses, no hepatosplenomegaly  - Musculoskeletal: no proximal muscle weakness in upper or lower extremities Right leg in a brace  - Integumentary: no acanthosis nigricans, no rashes, no edema,   - Neurological: reflexes 2+ at biceps, no tremor  - Psychiatric: normal mood and affect    Data Reviewed:   Component      Latest Ref Rng & Units 3/26/2021 3/26/2021 3/26/2021 3/26/2021           1:41 PM  1:41 PM  1:41 PM  1:41 PM   Cholesterol, total      100 - 199 mg/dL    216 (H)   Triglyceride      0 - 149 mg/dL    129   HDL Cholesterol      >39 mg/dL    71   VLDL, calculated      5 - 40 mg/dL    22   LDL, calculated      0 - 99 mg/dL    123 (H)   Hemoglobin A1c, (calculated)      4.8 - 5.6 %   5.3    Estimated average glucose      mg/dL   105    TSH      0.450 - 4.500 uIU/mL  7.540 (H)     VITAMIN D, 25-HYDROXY      30.0 - 100.0 ng/mL 14.2 (L)        Component      Latest Ref Rng & Units 3/26/2021 3/26/2021           1:41 PM  1:41 PM   Glucose      65 - 99 mg/dL  87   BUN      8 - 27 mg/dL  23   Creatinine      0.57 - 1.00 mg/dL  0.87   GFR est non-AA      >59 mL/min/1.73  71   GFR est AA      >59 mL/min/1.73  81   BUN/Creatinine ratio      12 - 28  26   Sodium      134 - 144 mmol/L  140   Potassium      3.5 - 5.2 mmol/L  4.4   Chloride      96 - 106 mmol/L  106   CO2      20 - 29 mmol/L  22   Calcium      8.7 - 10.3 mg/dL  9.7   Protein, total      6.0 - 8.5 g/dL  6.5   Albumin      3.8 - 4.8 g/dL  4.5   GLOBULIN, TOTAL      1.5 - 4.5 g/dL  2.0   A-G Ratio      1.2 - 2.2  2.3 (H)   Bilirubin, total      0.0 - 1.2 mg/dL  0.4   Alk.  phosphatase      39 - 117 IU/L  82   AST      0 - 40 IU/L  14   ALT      0 - 32 IU/L  12   WBC      3.4 - 10.8 x10E3/uL 4.9 RBC      3.77 - 5.28 x10E6/uL 4.13    HGB      11.1 - 15.9 g/dL 12.9    HCT      34.0 - 46.6 % 38.5    MCV      79 - 97 fL 93    MCH      26.6 - 33.0 pg 31.2    MCHC      31.5 - 35.7 g/dL 33.5    RDW      11.7 - 15.4 % 12.3    PLATELET      741 - 712 x10E3/uL 199      Assessment/Plan:   1. Acquired hypothyroidism    1) Her TSH in March 2021 was 7.5 will increase her LT4 to 150mcg every day. Pt instructed to take her LT4 by itself, on an empty stomach with a full glass of water and wait 30 minutes before she takes any other medications or eats. Pt voices understanding and agreement with the plan.     RTC 3 months    Copy sent to:  Dr. Roberta Carroll

## 2021-04-27 NOTE — PROGRESS NOTES
Follow-up Visit    Name Jaci Libman Age 60 y.o. MRN [id number]  1955       Chief Complaint: concussion    Patient fell down three weeks ago. She remembers waking in the street. She fell down but does not remember the circumstances. She woke up at a friends house. She had a CT scan and it did not show any contusions. She has been forgetful and she has been emotional. She broke her right patella developed a contusion over the right eye. She has trouble keeping the right eye open. It crusts over all she is sleeping. She has no redness. She has no excessive lacrimation. Assesment and Plan  1. Concussion  · Discussed the overwhelmingly favorable prognosis of concussion and the transient nature of the symptoms in well over 90% of the cases  · Concerning that she has had 10 concussions over the years. · Discussed the importance of avoiding head trauma during the recovery    Her main symptoms now are memory loss and labile emotions. I reassured her that these should improve over the next coming months. 2.  Migraine headaches  This complicates recovery from concussion. She has great outlook. 3.  Bipolar 1  Under control on current medications    Allergies  Motrin [ibuprofen]     Medications  Current Outpatient Medications   Medication Sig    cholecalciferol (VITAMIN D3) (5000 Units /125 mcg) capsule Take 1 Cap by mouth daily.  levothyroxine (SYNTHROID) 125 mcg tablet Take 1 Tab by mouth Daily (before breakfast).  onabotulinumtoxinA (BOTOX) 200 unit injection 200 units by IM route once every 12 weeks. Inject IM neck/face, 31 FDA approved sites. Indication: Migraine prevention. DX code: G43.71    traZODone (DESYREL) 300 mg tablet Take 300 mg by mouth nightly.     topiramate (TOPAMAX) 100 mg tablet TAKE ONE TABLET BY MOUTH TWICE DAILY    buPROPion SR (WELLBUTRIN SR) 150 mg SR tablet TAKE ONE TABLET BY MOUTH TWICE DAILY (Patient taking differently: 300 mg.)    diazePAM (VALIUM) 10 mg tablet Take 10 mg by mouth every six (6) hours as needed for Anxiety.  naloxone (NARCAN) 4 mg/actuation nasal spray Use 1 spray intranasally, then discard. Repeat with new spray every 2 min as needed for opioid overdose symptoms, alternating nostrils. No current facility-administered medications for this visit. Medical History  Past Medical History:   Diagnosis Date    Anxiety disorder     Breast cancer [facility] [address]     right lumpectomy    Concussion     Depression     Headache     Murmur     Psychotic disorder ([facility] [address]     Radiation therapy complication     late 7977 or early 2012 for radiation       Review of Systems   Eyes: Negative for blurred vision and double vision. Respiratory: Negative for cough and shortness of breath. Cardiovascular: Negative for chest pain, palpitations and orthopnea. Gastrointestinal: Negative for nausea and vomiting. Neurological: Positive for headaches. Negative for dizziness. Psychiatric/Behavioral: Positive for depression and memory loss. Negative for suicidal ideas. The patient is nervous/anxious. Exam:  Visit Vitals  BP (!) 154/4   Pulse 68   Temp 98.4 °F (36.9 °C)   Ht 5' 8\" (1.727 m)   Wt 180 lb (81.6 kg)   SpO2 96%   BMI 27.37 kg/m²        General: Well developed, well nourished. Patient in no apparent distress   Head: Normocephalic, atraumatic, anicteric sclera  Bruise above right eye. Healing   Neck Normal ROM, No thyromegally   Lungs:  Clear to auscultation    Cardiac: Regular rate and rhythm with no murmurs. Abd: Bowel sounds were audible. Ext: No pedal edema  Right leg in brace for broken patella   Skin: Supple no rash     NeurologicExam:  Mental Status: Alert and oriented to person place and time   Speech: Fluent no aphasia or dysarthria. Cranial Nerves:  II - XII Intact   Motor:  Full and symmetric strength of upper and lower extremities. Normal bulk and tone. Reflexes:   Deep tendon reflexes 2+/4 and symmetric.    Sensory:   Symmetric and intact with no perceived deficits . Gait:  Crutch supported. Tremor:   No tremor noted. Cerebellar:  Coordination intact. Neurovascular: No carotid bruits.  No JVD          Lab Review  Lab Results   Component Value Date/Time    WBC 4.9 03/26/2021 01:41 PM    HCT 38.5 03/26/2021 01:41 PM    HGB 12.9 03/26/2021 01:41 PM    PLATELET 227 10/23/8973 01:41 PM       Lab Results   Component Value Date/Time    Sodium 140 03/26/2021 01:41 PM    Potassium 4.4 03/26/2021 01:41 PM    Chloride 106 03/26/2021 01:41 PM    CO2 22 03/26/2021 01:41 PM    Glucose 87 03/26/2021 01:41 PM    BUN 23 03/26/2021 01:41 PM    Creatinine 0.87 03/26/2021 01:41 PM    Calcium 9.7 03/26/2021 01:41 PM         Lab Results   Component Value Date/Time    Hemoglobin A1c 5.3 03/26/2021 01:41 PM          Lab Results   Component Value Date/Time    Cholesterol, total 216 (H) 03/26/2021 01:41 PM    HDL Cholesterol 71 03/26/2021 01:41 PM    LDL, calculated 123 (H) 03/26/2021 01:41 PM    LDL, calculated 93 06/24/2019 03:32 PM    VLDL, calculated 22 03/26/2021 01:41 PM    VLDL, calculated 27 06/24/2019 03:32 PM    Triglyceride 129 03/26/2021 01:41 PM    CHOL/HDL Ratio 2.5 12/27/2016 09:09 AM

## 2021-04-27 NOTE — LETTER
4/27/2021 Patient: [place] YOB: 1955 Date of Visit: 4/27/2021 Lynnette Fields MD 
[address] [facility] [address] [address] [address] [facility] [address] Dear Lynnette Fields MD, Thank you for referring Ms. Hi to [address] [place] [address] DIABETES AND ENDOCRINOLOGY for evaluation. My notes for this consultation are attached. If you have questions, please do not hesitate to call me. I look forward to following your patient along with you. Sincerely, Freddy Nunes MD

## 2021-04-28 ENCOUNTER — HOSPITAL ENCOUNTER (OUTPATIENT)
Dept: MAMMOGRAPHY | Age: 66
Discharge: HOME | End: 2021-04-28
Attending: INTERNAL MEDICINE
Payer: MEDICAID

## 2021-04-28 DIAGNOSIS — Z85.3 ENCOUNTER FOR FOLLOW-UP SURVEILLANCE OF BREAST CANCER: ICD-10-CM

## 2021-04-28 DIAGNOSIS — Z08 ENCOUNTER FOR FOLLOW-UP SURVEILLANCE OF BREAST CANCER: ICD-10-CM

## 2021-04-28 LAB — COLOGUARD TEST, EXTERNAL: NEGATIVE

## 2021-04-28 PROCEDURE — 77067 SCR MAMMO BI INCL CAD: CPT

## 2021-04-29 ENCOUNTER — HOSPITAL ENCOUNTER (OUTPATIENT)
Dept: NON INVASIVE DIAGNOSTICS | Age: 66
Discharge: HOME | End: 2021-04-29
Attending: NURSE PRACTITIONER
Payer: MEDICAID

## 2021-04-29 ENCOUNTER — HOSPITAL ENCOUNTER (OUTPATIENT)
Dept: NUCLEAR MEDICINE | Age: 66
Discharge: HOME | End: 2021-04-29
Attending: NURSE PRACTITIONER
Payer: MEDICAID

## 2021-04-29 ENCOUNTER — TELEPHONE (OUTPATIENT)
Dept: CARDIOLOGY CLINIC | Age: 66
End: 2021-04-29

## 2021-04-29 VITALS
DIASTOLIC BLOOD PRESSURE: 80 MMHG | HEIGHT: 68 IN | BODY MASS INDEX: 29.57 KG/M2 | WEIGHT: 195.11 LBS | SYSTOLIC BLOOD PRESSURE: 150 MMHG

## 2021-04-29 DIAGNOSIS — R55 SYNCOPE, UNSPECIFIED SYNCOPE TYPE: ICD-10-CM

## 2021-04-29 DIAGNOSIS — R01.1 HEART MURMUR: ICD-10-CM

## 2021-04-29 DIAGNOSIS — R06.09 DOE (DYSPNEA ON EXERTION): ICD-10-CM

## 2021-04-29 DIAGNOSIS — R07.89 ATYPICAL CHEST PAIN: ICD-10-CM

## 2021-04-29 DIAGNOSIS — Z01.818 PRE-OPERATIVE CLEARANCE: ICD-10-CM

## 2021-04-29 DIAGNOSIS — E78.2 MIXED HYPERLIPIDEMIA: ICD-10-CM

## 2021-04-29 LAB
AV R PG: 80.56 MMHG
ECHO AR MAX VEL PISA: 448.78 CM/S
ECHO AV AREA PEAK VELOCITY: 2.28 CM2
ECHO AV AREA/BSA PEAK VELOCITY: 1.1 CM2/M2
ECHO AV PEAK GRADIENT: 8.84 MMHG
ECHO AV PEAK VELOCITY: 148.68 CM/S
ECHO AV REGURGITANT PHT: 895.31 MS
ECHO EST RA PRESSURE: 10 MMHG
ECHO LA AREA 4C: 12.34 CM2
ECHO LA TO AORTIC ROOT RATIO: 1.8
ECHO LA VOL 4C: 24.9 ML (ref 22–52)
ECHO LA VOLUME INDEX A4C: 12.31 ML/M2 (ref 16–28)
ECHO LV E' LATERAL VELOCITY: 10.04 CM/S
ECHO LV E' SEPTAL VELOCITY: 9.45 CM/S
ECHO LV INTERNAL DIMENSION DIASTOLIC MMODE: 4.73 CM
ECHO LV INTERNAL DIMENSION SYSTOLIC MMODE: 2.7 CM
ECHO LV IVSD MMODE: 1.08 CM
ECHO LV IVSS MMODE: 1.89 CM
ECHO LV POSTERIOR WALL DIASTOLIC MMODE: 0.95 CM
ECHO LV POSTERIOR WALL SYSTOLIC MMODE: 1.35 CM
ECHO LVOT DIAM: 1.96 CM
ECHO LVOT PEAK GRADIENT: 5 MMHG
ECHO LVOT PEAK VELOCITY: 111.85 CM/S
ECHO MV A VELOCITY: 69.57 CM/S
ECHO MV E DECELERATION TIME (DT): 277.12 MS
ECHO MV E VELOCITY: 74.17 CM/S
ECHO MV E/A RATIO: 1.07
ECHO MV E/E' LATERAL: 7.39
ECHO MV E/E' RATIO (AVERAGED): 7.62
ECHO MV E/E' SEPTAL: 7.85
ECHO PV PEAK INSTANTANEOUS GRADIENT SYSTOLIC: 1.94 MMHG
ECHO PV REGURGITANT MAX VELOCITY: 69.6 CM/S
ECHO RIGHT VENTRICULAR SYSTOLIC PRESSURE (RVSP): 30.31 MMHG
ECHO RV INTERNAL DIMENSION: 2.82 CM
ECHO TV A WAVE: 36.87 CM/S
ECHO TV E WAVE: 38.09 CM/S
ECHO TV REGURGITANT MAX VELOCITY: 225.32 CM/S
ECHO TV REGURGITANT MAX VELOCITY: 395.83 CM/S
ECHO TV REGURGITANT PEAK GRADIENT: 20.31 MMHG

## 2021-04-29 PROCEDURE — 93306 TTE W/DOPPLER COMPLETE: CPT

## 2021-04-29 PROCEDURE — 93017 CV STRESS TEST TRACING ONLY: CPT

## 2021-04-29 PROCEDURE — A9500 TC99M SESTAMIBI: HCPCS

## 2021-04-29 NOTE — TELEPHONE ENCOUNTER
----- Message from Yifan Perkins MD sent at 4/29/2021  3:22 PM EDT -----  Inform her Echo is k      Called pt and left message to call me back regarding test results.

## 2021-04-30 ENCOUNTER — HOSPITAL ENCOUNTER (OUTPATIENT)
Dept: NON INVASIVE DIAGNOSTICS | Age: 66
Discharge: HOME | End: 2021-04-30
Attending: NURSE PRACTITIONER
Payer: MEDICAID

## 2021-04-30 ENCOUNTER — TELEPHONE (OUTPATIENT)
Dept: CARDIOLOGY CLINIC | Age: 66
End: 2021-04-30

## 2021-04-30 LAB
STRESS BASELINE DIAS BP: 69 MMHG
STRESS BASELINE HR: 56 BPM
STRESS BASELINE SYS BP: 137 MMHG
STRESS ESTIMATED WORKLOAD: 1 METS
STRESS EXERCISE DUR MIN: NORMAL
STRESS O2 SAT PEAK: 99 %
STRESS O2 SAT REST: 95 %
STRESS PEAK DIAS BP: 71 MMHG
STRESS PEAK SYS BP: 159 MMHG
STRESS PERCENT HR ACHIEVED: 58 %
STRESS POST PEAK HR: 90 BPM
STRESS RATE PRESSURE PRODUCT: NORMAL BPM*MMHG
STRESS TARGET HR: 156 BPM

## 2021-04-30 PROCEDURE — 74011250636 HC RX REV CODE- 250/636: Performed by: NURSE PRACTITIONER

## 2021-04-30 RX ORDER — SODIUM CHLORIDE 9 MG/ML
10 INJECTION INTRAMUSCULAR; INTRAVENOUS; SUBCUTANEOUS AS NEEDED
Status: DISCONTINUED | OUTPATIENT
Start: 2021-04-30 | End: 2021-05-04 | Stop reason: HOSPADM

## 2021-04-30 RX ADMIN — SODIUM CHLORIDE 10 ML: 9 INJECTION INTRAMUSCULAR; INTRAVENOUS; SUBCUTANEOUS at 10:25

## 2021-04-30 RX ADMIN — REGADENOSON 0.4 MG: 0.08 INJECTION, SOLUTION INTRAVENOUS at 10:25

## 2021-05-03 NOTE — TELEPHONE ENCOUNTER
Spoke with patient. Verified with two patient identifiers. Advised that per Dr. Lary Coello her stress test was ok. Patient verbalized understanding.

## 2021-05-03 NOTE — TELEPHONE ENCOUNTER
Spoke with patient. Verified with two patient identifiers. Advised that EF is 55-60% which is normal and per Dr. Qi Harrell her echo was ok. Patient verbalized understanding.

## 2021-06-16 ENCOUNTER — OFFICE VISIT (OUTPATIENT)
Dept: FAMILY MEDICINE CLINIC | Age: 66
End: 2021-06-16
Payer: MEDICAID

## 2021-06-16 VITALS
HEART RATE: 68 BPM | SYSTOLIC BLOOD PRESSURE: 126 MMHG | BODY MASS INDEX: 29.35 KG/M2 | WEIGHT: 193 LBS | TEMPERATURE: 96 F | DIASTOLIC BLOOD PRESSURE: 80 MMHG | OXYGEN SATURATION: 95 % | RESPIRATION RATE: 16 BRPM

## 2021-06-16 DIAGNOSIS — M25.562 CHRONIC PAIN OF LEFT KNEE: Primary | ICD-10-CM

## 2021-06-16 DIAGNOSIS — G89.29 CHRONIC PAIN OF LEFT KNEE: Primary | ICD-10-CM

## 2021-06-16 DIAGNOSIS — Z01.818 PREOPERATIVE CLEARANCE: ICD-10-CM

## 2021-06-16 PROCEDURE — 99213 OFFICE O/P EST LOW 20 MIN: CPT | Performed by: FAMILY MEDICINE

## 2021-06-16 RX ORDER — VILAZODONE HYDROCHLORIDE 20 MG/1
20 TABLET ORAL DAILY
COMMUNITY
End: 2021-07-28

## 2021-06-16 NOTE — PROGRESS NOTES
1. Have you been to the ER, urgent care clinic since your last visit? Hospitalized since your last visit? No    2. Have you seen or consulted any other health care providers outside of the [address] [place] [address] since your last visit? Include any pap smears or colon screening. No     Chief Complaint   Patient presents with    Form Completion     Preop surgical clearance for R knee arthroscopy.

## 2021-06-16 NOTE — PATIENT INSTRUCTIONS
Learning About How to Prepare for Surgery  How can you prepare before surgery? You can do some things that will help you safely prepare for surgery. · Understand exactly what surgery is planned. You should know the risks, benefits, and other options. · Tell your doctors ALL the medicines, vitamins, supplements, and herbal remedies you take. Some of these can increase the risk of bleeding. Or they may interact with anesthesia. · Follow your doctor's instructions about which medicines to take or stop before your surgery. ? You may need to stop taking some medicines a week or more before surgery. ? If you take aspirin or some other blood thinner, be sure to talk to your doctor. · Follow any other instructions your doctor gave you. · If you have an advance directive, let your doctor know, and bring a copy to the hospital.   It may include a living will and a durable power of  for health care. It lets your doctor and loved ones know your health care wishes. If you don't have one, you may want to prepare one. How can you prepare on the day of surgery? Here are some tips about what to do at home before you leave for your surgery. · If your doctor told you to take your medicines on the day of surgery, take them with only a sip of water. · Follow the instructions about when to stop eating and drinking. If you don't, your surgery may be canceled. · Follow your doctor's instructions about when to bathe or shower before your surgery. · Do not shave the surgical site yourself. · Take off all jewelry and piercings. · Take out contact lenses, if you wear them. · Have a picture ID ready to take with you. Your ID will be checked before your surgery. · Know when to call your doctor. Call your doctor if you:  ? Become ill before surgery. ? Need to reschedule. ? Have changed your mind about having the surgery. What happens before surgery?   Here are some things you can expect to happen before your surgery. · Your picture ID will be checked. · The area of your body that needs surgery is often marked to make sure there are no errors. · You will be kept comfortable and safe by your anesthesia provider. The anesthesia may make you sleep. Or it may just numb the area being worked on. What happens when you are ready to go home? Be sure you have someone drive you home. Anesthesia and pain medicine make it unsafe for you to drive. You will get instructions about recovering from your surgery. This is called a discharge plan. It will cover things like diet, wound care, follow-up care, driving, and getting back to your normal routine. Follow-up care is a key part of your treatment and safety. Be sure to make and go to all appointments, and call your doctor if you are having problems. It's also a good idea to know your test results and keep a list of the medicines you take. Where can you learn more? Go to http://www.gray.com/  Enter Q270 in the search box to learn more about \"Learning About How to Prepare for Surgery. \"  Current as of: May 27, 2020               Content Version: 12.8  © 8730-5726 [employer]. Care instructions adapted under license by [employer] (which disclaims liability or warranty for this information). If you have questions about a medical condition or this instruction, always ask your healthcare professional. [address] any warranty or liability for your use of this information. Learning About What to Expect at Home After Surgery  What do you need to know when you leave the hospital after surgery? Each person recovers from surgery at a different pace. Your discharge plan will help you leave the hospital safely. It will outline the care you need. And it will give you information about the things you'll need to do at home. Make sure you get your plan in writing.  Look for information on:  · What your medicines are and how to take them. · When you need to see the doctor again or get any follow-up tests. · How and when to change bandages and dressings. · How active you can be. This may include physical therapy. · How to prevent falls. · What you can eat and can't eat. What do you need to know about taking medicines? Your doctor will talk with you about restarting your medicines. He or she will also tell you about taking any new medicines. · If you take aspirin or some other blood thinner, be sure to talk to your doctor. He or she will tell you if and when to start taking those medicines again. · If your doctor gave you a prescription medicine for pain, take it as prescribed. · If you aren't taking a prescription pain medicine, ask your doctor if you can take an over-the-counter medicine. How can you take care of your incision? If you have a cut (incision), follow your doctor's instructions to care for it. If you did not get instructions, follow this general advice:  · You will have a dressing over the cut. A dressing helps the incision heal and protects it. ? Change the bandage every day. ? If you have strips of tape on the cut, leave them on for a week or until they fall off.  ? If you have stitches or staples, your doctor will tell you when to come back to have them removed. ? If you have skin glue (liquid stitches) on the cut, leave it on until it falls off. · Wash the area daily with warm water, and pat it dry. Don't use hydrogen peroxide or alcohol. · You may shower 24 to 48 hours after surgery. Pat the incision dry. Don't swim or take a bath for the first 2 weeks, or until your doctor tells you it's okay. When can you be active again? One of the most important things you can do for yourself after surgery is to find ways to move.  When you move as much as possible, even in bed, you are helping your body heal.  Here are some tips:  · Don't move quickly or lift anything heavy until you feel better. · Taking short walks is a good way to help your body heal.  · Rest when you feel tired. Your doctor may give you instructions on when you can do your normal activities again, such as driving and going back to work. What do you need to know about eating? If your doctor told you when you can start eating and what foods you can eat, follow your doctor's instructions. If you did not get instructions, follow this general advice:  · You can eat your normal diet when you feel well. If your stomach is upset, try bland, low-fat foods. These include plain rice, broiled chicken, toast, and yogurt. · If your bowel movements aren't regular right after surgery, try to avoid constipation and straining. Drink plenty of water. Your doctor may suggest fiber, a stool softener, or a mild laxative. What do you do if you have infection or pain? If you have signs of infection, call your doctor. These signs include:  · Increased pain, swelling, warmth, or redness. · Red streaks leading from the incision. · Pus draining from the incision. · A fever. Also call your doctor if you have pain that does not get better after you take pain medicine. Follow-up care is a key part of your treatment and safety. Be sure to make and go to all appointments, and call your doctor if you are having problems. It's also a good idea to know your test results and keep a list of the medicines you take. Where can you learn more? Go to http://www.gray.com/  Enter Z718 in the search box to learn more about \"Learning About What to Expect at Home After Surgery. \"  Current as of: May 27, 2020               Content Version: 12.8  © 7488-2881 [employer]. Care instructions adapted under license by [employer] (which disclaims liability or warranty for this information).  If you have questions about a medical condition or this instruction, always ask your healthcare professional. [employer] disclaims any warranty or liability for your use of this information.

## 2021-06-17 ENCOUNTER — TELEPHONE (OUTPATIENT)
Dept: NEUROLOGY | Age: 66
End: 2021-06-17

## 2021-06-17 NOTE — TELEPHONE ENCOUNTER
I need updated trials/fails for Botox renewal.     Please update chart to identify prohylactics.        Botox appt 7/1/21

## 2021-06-18 ENCOUNTER — TELEPHONE (OUTPATIENT)
Dept: NEUROLOGY | Age: 66
End: 2021-06-18

## 2021-06-18 NOTE — TELEPHONE ENCOUNTER
Mo,     I sent Dr. Ritchie Massed note yesterday -     I need updated trials/fails for Botox renewal.      Please update chart to identify prohylactics.          Botox appt 7/1/21

## 2021-06-19 NOTE — PROGRESS NOTES
Preoperative Evaluation    Date of Exam: 2021    Norma Tinajero is a 73 y.o. female (:1955) who presents for preoperative evaluation. who presents for preoperative evaluation with current medical hx of eye cataract disorder for the last couple of years pt has been in a stable conditions and  stating that the current eyedrops prescription and the glasses are not helping  The vision any more and seeking alternative surgical correction for current medical condition at this time,      pt's physical functioning is capable of doing >5 blocks of walking w/out getting shortness of breath, at this time there is no assistive devices used physically,   Patient with a good social support which include living at home, the pt is self cared, and the pt's other primary care giver is  at home,  no recent major trauma, does not have any history of blood clots, patient currently on daily baby dose aspirin as the only blood thinner for many years stating that the pt has had no hx of abnormal bleeding or easy bruisabiltity. In addition pt is currently not taking any herbal supplements, nor any illicit drugs, current status of  for low pt is stable      Latex Allergy: no    Medical History:     Past Medical History:   Diagnosis Date    Anxiety disorder     Breast cancer ([address]     right lumpectomy    Concussion     Depression     Headache     Ill-defined condition     migraine HA     Ill-defined condition     concussion 2021 post fall    Murmur     Psychotic disorder ([facility] [address]     Radiation therapy complication     late  or early  for radiation    Thyroid disease      thyroid no longer working post lithium med     Allergies: Allergies   Allergen Reactions    Motrin [Ibuprofen] Unknown (comments)     ulcers      Medications:     Current Outpatient Medications   Medication Sig    vilazodone (Viibryd) 20 mg tab tablet Take 20 mg by mouth daily.     levothyroxine (SYNTHROID) 150 mcg tablet Take 1 Tab by mouth Daily (before breakfast).  naloxone (NARCAN) 4 mg/actuation nasal spray Use 1 spray intranasally, then discard. Repeat with new spray every 2 min as needed for opioid overdose symptoms, alternating nostrils.  cholecalciferol (VITAMIN D3) (5000 Units /125 mcg) capsule Take 1 Cap by mouth daily.  onabotulinumtoxinA (BOTOX) 200 unit injection 200 units by IM route once every 12 weeks. Inject IM neck/face, 31 FDA approved sites. Indication: Migraine prevention. DX code: G43.71    traZODone (DESYREL) 300 mg tablet Take 300 mg by mouth nightly.  topiramate (TOPAMAX) 100 mg tablet TAKE ONE TABLET BY MOUTH TWICE DAILY (Patient taking differently: 100 mg two (2) times a day.)    buPROPion SR (WELLBUTRIN SR) 150 mg SR tablet TAKE ONE TABLET BY MOUTH TWICE DAILY (Patient taking differently: 300 mg. Takes 2 tabs in AM)    diazePAM (VALIUM) 10 mg tablet Take 10 mg by mouth every six (6) hours as needed for Anxiety.  aspirin delayed-release 81 mg tablet Take  by mouth daily. Not started yet (Patient not taking: Reported on 6/16/2021)     No current facility-administered medications for this visit.      Surgical History:     Past Surgical History:   Procedure Laterality Date    HX ANKLE FRACTURE TX      left surgical repair 2008    HX BREAST BIOPSY Left     benign 2011    HX BREAST LUMPECTOMY Right     2011    HX HEENT      tonsillectomy    HX HYSTERECTOMY      HX ORTHOPAEDIC       left meniscus repair x 2    HX OTHER SURGICAL      barthalomew cyst rupture repair    HX ROTATOR CUFF REPAIR      right 2015     Social History:     Social History     Socioeconomic History    Marital status: SINGLE     Spouse name: Not on file    Number of children: Not on file    Years of education: Not on file    Highest education level: Not on file   Tobacco Use    Smoking status: Never Smoker    Smokeless tobacco: Never Used   Vaping Use    Vaping Use: Never used   Substance and Sexual Activity  Alcohol use: Yes     Comment: social    Drug use: Not Currently     Types: Cocaine     Comment: \"back in the day\"    Sexual activity: Not Currently     Social Determinants of Health     Financial Resource Strain:     Difficulty of Paying Living Expenses:    Food Insecurity:     Worried About Running Out of Food in the Last Year:     [address] in the Last Year:    Transportation Needs:     Lack of Transportation (Medical):  Lack of Transportation (Non-Medical):    Physical Activity:     Days of Exercise per Week:     Minutes of Exercise per Session:    Stress:     Feeling of Stress :    Social Connections:     Frequency of Communication with Friends and Family:     Frequency of Social Gatherings with Friends and Family:     Attends [employer] Services:     Active Member of Clubs or Organizations:     Attends Club or Organization Meetings:     Marital Status:        Anesthesia Complications: None  History of abnormal bleeding : None  History of Blood Transfusions: no  Health Care Directive or Living Will: no    Objective:       Constitutional: no chills and fever, obese, nad     HENT: no ear head pain and nosebleeds. No blurred vision, pain and discharge. Respiratory: no shortness of breath, wheezing cough nor sore throat. Cardiovascular: Has no chest pain, leg swelling ,and racing heart . Gastrointestinal: No constipation, diarrhea, nausea and vomiting. Genitourinary: No frequency. Musculoskeletal: Negative for joint pain. Skin: no itching, pimples or acne rash. Neurological: Negative for dizziness, no tremors  Psychiatric/Behavioral: Negative for depression has normal interest to do things and not depressed the patient is not nervous/anxious.         General: Patient alert cooperative appears stated for the age  [de-identified]; normocephalic and atraumatic PERRLA extraocular motor intact normal tympanic membrane normal external ear bilaterally normal sinuses with mucosal normal no drainage  Neck: supple no adenopathy no JVD no bruit  Lungs: Clear to auscultation bilaterally no rales rhonchi or wheezes  Heart: Normal regular S1-S2 no gallops rubs or clicks no murmur  Breast exam deferred  Abdomen: Soft nontender normal bowel sounds no organomegaly  Extremities: Normal range of motion no point tenderness normal pulses no edema  Pelvic/: No lesion, no lymphadenopathy  Skin: Normal no lesion no rash        DIAGNOSTICS:   1. EKG: EKG FINDINGS -not indicated  2. CXR: Not indicated  3.  Labs:   Lab Results   Component Value Date/Time    WBC 4.9 03/26/2021 01:41 PM    HGB 12.9 03/26/2021 01:41 PM    HCT 38.5 03/26/2021 01:41 PM    PLATELET 582 59/08/4565 01:41 PM    MCV 93 03/26/2021 01:41 PM     Lab Results   Component Value Date/Time    Hemoglobin A1c 5.3 03/26/2021 01:41 PM    Glucose 87 03/26/2021 01:41 PM    LDL, calculated 123 (H) 03/26/2021 01:41 PM    LDL, calculated 93 06/24/2019 03:32 PM    Creatinine 0.87 03/26/2021 01:41 PM      Lab Results   Component Value Date/Time    Cholesterol, total 216 (H) 03/26/2021 01:41 PM    HDL Cholesterol 71 03/26/2021 01:41 PM    LDL, calculated 123 (H) 03/26/2021 01:41 PM    LDL, calculated 93 06/24/2019 03:32 PM    Triglyceride 129 03/26/2021 01:41 PM    CHOL/HDL Ratio 2.5 12/27/2016 09:09 AM       IMPRESSION:   Low risk for planned surgery  No contraindications to planned surgery      Karen Hagan MD   6/16/2021

## 2021-06-22 ENCOUNTER — TELEPHONE (OUTPATIENT)
Dept: NEUROLOGY | Age: 66
End: 2021-06-22

## 2021-06-22 DIAGNOSIS — G43.711 INTRACTABLE CHRONIC MIGRAINE WITHOUT AURA AND WITH STATUS MIGRAINOSUS: ICD-10-CM

## 2021-06-22 RX ORDER — ONABOTULINUMTOXINA 200 [USP'U]/1
INJECTION, POWDER, LYOPHILIZED, FOR SOLUTION INTRADERMAL; INTRAMUSCULAR
Qty: 1 VIAL | Refills: 3 | Status: SHIPPED | OUTPATIENT
Start: 2021-06-22 | End: 2022-02-10 | Stop reason: SDUPTHER

## 2021-06-22 NOTE — TELEPHONE ENCOUNTER
Botox  - sent as a renewal to [facility] w/ the last two office visits in April attached. Key: [id number] - PA Case ID: [id number] via  KEKAILYN MORRIS.  Approved Varinder Barbosa  [id number]    6/22/2021 -  6/22/2022      CPT 26968  - PENDING   sent to [facility]  Ref [id number] and requested for start date of 6/23/21 to allow for [facility] to process and set up delivery. Status is pending.

## 2021-06-22 NOTE — TELEPHONE ENCOUNTER
Botox    PA Case: [id number]    6/22/2021  - 6/22/2022  By [place] [employer]. CPT 06822 per [facility] chat today    Precertification is not required for [address] for in-network providers. The pharmacy vendor is [facility].

## 2021-06-29 ENCOUNTER — DOCUMENTATION ONLY (OUTPATIENT)
Dept: NEUROLOGY | Age: 66
End: 2021-06-29

## 2021-06-29 NOTE — PROGRESS NOTES
Received for 7/1 appointment from [facility]  Lot number: [id number]  Expiration date: 3/31/2024

## 2021-07-01 ENCOUNTER — TELEPHONE (OUTPATIENT)
Dept: NEUROLOGY | Age: 66
End: 2021-07-01

## 2021-07-19 DIAGNOSIS — E03.9 ACQUIRED HYPOTHYROIDISM: ICD-10-CM

## 2021-07-28 ENCOUNTER — OFFICE VISIT (OUTPATIENT)
Dept: ENDOCRINOLOGY | Age: 66
End: 2021-07-28
Payer: MEDICAID

## 2021-07-28 VITALS
SYSTOLIC BLOOD PRESSURE: 108 MMHG | BODY MASS INDEX: 28.89 KG/M2 | HEART RATE: 69 BPM | HEIGHT: 68 IN | WEIGHT: 190.6 LBS | DIASTOLIC BLOOD PRESSURE: 69 MMHG

## 2021-07-28 DIAGNOSIS — E03.9 ACQUIRED HYPOTHYROIDISM: Primary | ICD-10-CM

## 2021-07-28 PROCEDURE — 99213 OFFICE O/P EST LOW 20 MIN: CPT | Performed by: INTERNAL MEDICINE

## 2021-07-28 RX ORDER — VILAZODONE HYDROCHLORIDE 40 MG/1
TABLET ORAL
COMMUNITY
Start: 2021-06-14 | End: 2021-08-02 | Stop reason: ALTCHOICE

## 2021-07-28 NOTE — LETTER
7/28/2021    Patient: Heidi Jenkins   YOB: 1955   Date of Visit: 7/28/2021     Minerva Herr, [address] [place] [address]    Dear Minerva Herr MD,      Thank you for referring Ms. Heidi Jenkins to [facility] for evaluation. My notes for this consultation are attached. If you have questions, please do not hesitate to call me. I look forward to following your patient along with you.       Sincerely,    Ehsan Fatima MD

## 2021-07-28 NOTE — PROGRESS NOTES
Chief Complaint   Patient presents with    Thyroid Problem     pcp and pharmacy verified     History of Present Illness: [place] is a 73 y.o. female here for follow up of hypothyroidism. \"I had been taking lithium in the past, for bipolar d/0 and it killed my thyroid. \"  At our initial visit in April 2021 her TSH was 7.5 so I increased her LT4 to 150mcg every day. Pt instructed to take her LT4 by itself, on an empty stomach with a full glass of water and wait 30 minutes before she takes any other medications or eats. Pt reports she had a fall in April 2021, \"I fell and I was in a coma for 3 days. \"  \"I hit my head, tore my roator cuff and chipped my left knee. I have had surgery on my knee and I will have surgery for my left shoulder next week. \"    Pt is taking the LT4 150mcg every day. She takes this first thing in the morning, on an empty stomach, with a full glass of water by itself and waits 30-60 minutes before she eats. Pt is still seeing Dr. Jax Davila of Neurology for issues of migraines. \"Becaue I can not walk right now I am having more trouble with my weight. \"  Her weight today was 190 pounds, which is down 5 pounds from April 2021. She has been taking GoLo and \"I have been watching what I eat\". She denies issues of palpitations, or CP, SOB, tremors, heat or cold intolerance, diarrhea or constipation. She denies issues of dysphagia, dysphonia or chocking issues. Current Outpatient Medications   Medication Sig    vilazodone (Viibryd) 40 mg tab tablet TAKE 1 & 1 2 (ONE & ONE HALF) TABLETS BY MOUTH ONCE DAILY    onabotulinumtoxinA (Botox) 200 unit injection 200 units by IM route once every 12 weeks. Inject IM neck/face, 31 FDA approved sites. Indication: Migraine prevention. DX code: G37.69    levothyroxine (SYNTHROID) 150 mcg tablet Take 1 Tab by mouth Daily (before breakfast).  naloxone (NARCAN) 4 mg/actuation nasal spray Use 1 spray intranasally, then discard.  Repeat with new spray every 2 min as needed for opioid overdose symptoms, alternating nostrils.  cholecalciferol (VITAMIN D3) (5000 Units /125 mcg) capsule Take 1 Cap by mouth daily.  traZODone (DESYREL) 300 mg tablet Take 300 mg by mouth nightly.  topiramate (TOPAMAX) 100 mg tablet TAKE ONE TABLET BY MOUTH TWICE DAILY (Patient taking differently: 100 mg two (2) times a day.)    buPROPion SR (WELLBUTRIN SR) 150 mg SR tablet TAKE ONE TABLET BY MOUTH TWICE DAILY (Patient taking differently: 300 mg. Takes 2 tabs in AM)    diazePAM (VALIUM) 10 mg tablet Take 10 mg by mouth every six (6) hours as needed for Anxiety. No current facility-administered medications for this visit. Allergies   Allergen Reactions    Motrin [Ibuprofen] Unknown (comments)     ulcers     Review of Systems:  - Cardiovascular: no chest pain  - Neurological: no tremors  - Integumentary: skin is normal    Physical Examination:  Blood pressure 108/69, pulse 69, height 5' 8\" (1.727 m), weight 190 lb 9.6 oz (86.5 kg). - General: pleasant, no distress, good eye contact   - Neck: no thyromegaly or thyroid bruits  - Cardiovascular: regular, normal rate, nl s1 and s2, no m/r/g   - Integumentary: skin is normal, no edema  - Neurological: reflexes 2+ at biceps, no tremors  - Psychiatric: normal mood and affect    Data Reviewed:   - none new for review    Assessment/Plan:   1) Hypothyroidism > Pt is clinically euthyroid on LT4 150mcg daily. Will order TFTs and adjust her dose as needed. .    Pt voices understanding and agreement with the plan. RTC 3 months    Copy sent to:  Dr. Tejeda Form  There are no Patient Instructions on file for this visit. Follow-up and Dispositions    · Return in about 3 months (around 10/28/2021).

## 2021-07-29 ENCOUNTER — TELEPHONE (OUTPATIENT)
Dept: ENDOCRINOLOGY | Age: 66
End: 2021-07-29

## 2021-07-29 LAB
T4 FREE SERPL-MCNC: 1.16 NG/DL (ref 0.82–1.77)
TSH SERPL DL<=0.005 MIU/L-ACNC: 1.46 UIU/ML (ref 0.45–4.5)

## 2021-07-29 NOTE — TELEPHONE ENCOUNTER
----- Message from Pierre Chau sent at 7/29/2021  2:03 PM EDT -----  Regarding: Dr. Randall Post  Pt is requesting a call with lab results from 07/29/2021. Best contact number [phone].

## 2021-08-02 ENCOUNTER — OFFICE VISIT (OUTPATIENT)
Dept: FAMILY MEDICINE CLINIC | Age: 66
End: 2021-08-02
Payer: MEDICAID

## 2021-08-02 VITALS
HEIGHT: 68 IN | TEMPERATURE: 97.7 F | HEART RATE: 68 BPM | RESPIRATION RATE: 20 BRPM | OXYGEN SATURATION: 95 % | DIASTOLIC BLOOD PRESSURE: 69 MMHG | BODY MASS INDEX: 29.25 KG/M2 | WEIGHT: 193 LBS | SYSTOLIC BLOOD PRESSURE: 114 MMHG

## 2021-08-02 DIAGNOSIS — E03.9 ACQUIRED HYPOTHYROIDISM: ICD-10-CM

## 2021-08-02 DIAGNOSIS — R35.0 FREQUENCY OF URINATION: ICD-10-CM

## 2021-08-02 DIAGNOSIS — M81.0 POSTMENOPAUSAL OSTEOPOROSIS: ICD-10-CM

## 2021-08-02 DIAGNOSIS — Z23 ENCOUNTER FOR IMMUNIZATION: Primary | ICD-10-CM

## 2021-08-02 DIAGNOSIS — E55.9 VITAMIN D DEFICIENCY: ICD-10-CM

## 2021-08-02 DIAGNOSIS — Z01.818 PRE-OP EVALUATION: ICD-10-CM

## 2021-08-02 DIAGNOSIS — G43.711 INTRACTABLE CHRONIC MIGRAINE WITHOUT AURA AND WITH STATUS MIGRAINOSUS: ICD-10-CM

## 2021-08-02 DIAGNOSIS — F43.10 PTSD (POST-TRAUMATIC STRESS DISORDER): ICD-10-CM

## 2021-08-02 PROCEDURE — 99214 OFFICE O/P EST MOD 30 MIN: CPT | Performed by: INTERNAL MEDICINE

## 2021-08-02 RX ORDER — TRAZODONE HYDROCHLORIDE 300 MG/1
300 TABLET ORAL
Qty: 90 TABLET | Refills: 0 | Status: CANCELLED | OUTPATIENT
Start: 2021-08-02

## 2021-08-02 RX ORDER — DIAZEPAM 10 MG/1
10 TABLET ORAL
Qty: 45 TABLET | Refills: 0 | Status: CANCELLED | OUTPATIENT
Start: 2021-08-02

## 2021-08-02 RX ORDER — BUPROPION HYDROCHLORIDE 150 MG/1
300 TABLET, EXTENDED RELEASE ORAL DAILY
Qty: 60 TABLET | Refills: 0 | Status: SHIPPED | OUTPATIENT
Start: 2021-08-02

## 2021-08-02 RX ORDER — TOPIRAMATE 100 MG/1
TABLET, FILM COATED ORAL
Qty: 60 TABLET | Refills: 3 | Status: SHIPPED | OUTPATIENT
Start: 2021-08-02

## 2021-08-02 RX ORDER — CHOLECALCIFEROL (VITAMIN D3) 125 MCG
5000 CAPSULE ORAL DAILY
Qty: 90 CAPSULE | Refills: 1 | Status: SHIPPED | OUTPATIENT
Start: 2021-08-02 | End: 2022-07-20 | Stop reason: ALTCHOICE

## 2021-08-02 NOTE — PROGRESS NOTES
Chief Complaint   Patient presents with   Nazanine Son is a 73 y.o.  female with history of hypothyroidism, depression and anxiety, PTSD presents for pre-operative evaluation. .  Patient has a right shoulder arthroscopic rotator cuff repair/debridment surgery scheduled with Terri Guo 8/6/2021. Blood pressure is at goal. Patient has been doing well. She has no complaints. Most recent EKG done 4/12/2021 showed normal sinus rhythm.     Review of Systems  General: negative for - chills, fever or malaise  ENT : negative for - nasal congestion or sore throat  Respiratory: negative for - cough, shortness of breath or wheezing  Cardiovascular: negative for - chest pain, dyspnea on exertion or shortness of breath    Past Medical History:   Diagnosis Date    Anxiety disorder     Breast cancer ([facility] [address]     right lumpectomy    Concussion     Depression     Fall 04/2021    Headache     Ill-defined condition     migraine HA     Ill-defined condition     concussion March 2021 post fall    Murmur     Psychotic disorder ([facility] [address]     Radiation therapy complication     late 0941 or early 2012 for radiation    Thyroid disease     2005 thyroid no longer working post lithium med     Past Surgical History:   Procedure Laterality Date    HX ANKLE FRACTURE TX      left surgical repair 2008    HX BREAST BIOPSY Left     benign 2011    HX BREAST LUMPECTOMY Right     2011    HX HEENT      tonsillectomy    HX HYSTERECTOMY      HX ORTHOPAEDIC       left meniscus repair x 2    HX OTHER SURGICAL      barthalomew cyst rupture repair    HX ROTATOR CUFF REPAIR      right 2015     Social History     Socioeconomic History    Marital status: SINGLE     Spouse name: Not on file    Number of children: Not on file    Years of education: Not on file    Highest education level: Not on file   Tobacco Use    Smoking status: Never Smoker    Smokeless tobacco: Never Used   Vaping Use    Vaping Use: Never used   Substance and Sexual Activity    Alcohol use: Yes     Comment: social    Drug use: Not Currently     Types: Cocaine     Comment: \"back in the day\"    Sexual activity: Not Currently     Social Determinants of Health     Financial Resource Strain:     Difficulty of Paying Living Expenses:    Food Insecurity:     Worried About Running Out of Food in the Last Year:     [address] in the Last Year:    Transportation Needs:     Lack of Transportation (Medical):  Lack of Transportation (Non-Medical):    Physical Activity:     Days of Exercise per Week:     Minutes of Exercise per Session:    Stress:     Feeling of Stress :    Social Connections:     Frequency of Communication with Friends and Family:     Frequency of Social Gatherings with Friends and Family:     Attends Denominational Services:     Active Member of Clubs or Organizations:     Attends Club or Organization Meetings:     Marital Status:      Family History   Adopted: Yes   Problem Relation Age of Onset    Alcohol abuse Mother      Current Outpatient Medications   Medication Sig Dispense Refill    vilazodone (Viibryd) 40 mg tab tablet TAKE 1 & 1 2 (ONE & ONE HALF) TABLETS BY MOUTH ONCE DAILY      onabotulinumtoxinA (Botox) 200 unit injection 200 units by IM route once every 12 weeks. Inject IM neck/face, 31 FDA approved sites. Indication: Migraine prevention. DX code: G43.71 1 Vial 3    levothyroxine (SYNTHROID) 150 mcg tablet Take 1 Tab by mouth Daily (before breakfast). 30 Tab 3    naloxone (NARCAN) 4 mg/actuation nasal spray Use 1 spray intranasally, then discard. Repeat with new spray every 2 min as needed for opioid overdose symptoms, alternating nostrils. 2 Each 0    cholecalciferol (VITAMIN D3) (5000 Units /125 mcg) capsule Take 1 Cap by mouth daily. 90 Cap 1    traZODone (DESYREL) 300 mg tablet Take 300 mg by mouth nightly.       topiramate (TOPAMAX) 100 mg tablet TAKE ONE TABLET BY MOUTH TWICE DAILY (Patient taking differently: 100 mg two (2) times a day.) 60 Tab 3    buPROPion SR (WELLBUTRIN SR) 150 mg SR tablet TAKE ONE TABLET BY MOUTH TWICE DAILY (Patient taking differently: 300 mg. Takes 2 tabs in AM) 60 Tab 3    diazePAM (VALIUM) 10 mg tablet Take 10 mg by mouth every six (6) hours as needed for Anxiety. Allergies   Allergen Reactions    Motrin [Ibuprofen] Unknown (comments)     ulcers       Objective:  Visit Vitals  /69   Pulse 68   Temp 97.7 °F (36.5 °C) (Oral)   Resp 20   Ht 5' 8\" (1.727 m)   Wt 193 lb (87.5 kg)   SpO2 95%   BMI 29.35 kg/m²     Physical Exam:   General appearance - alert, well appearing in no distress  Mental status - alert, oriented to person, place, and time  Mouth - mucous membranes moist, pharynx normal without lesions  Neck - supple, no JVD    Chest - clear to auscultation, no wheezes, rales or rhonchi  Heart - normal rate, regular rhythm, no murmurs  Abdomen - soft, nontender, nondistended, no organomegaly  Ext-peripheral pulses normal, no pedal edema  Skin-Warm and dry. no rash or suspicious lesions  Neuro -no focal findings  Back-full range of motion, no tenderness, palpable spasm or pain on motion     Results for orders placed or performed in visit on 07/28/21   TSH 3RD GENERATION   Result Value Ref Range    TSH 1.460 0.450 - 4.500 uIU/mL   T4, FREE   Result Value Ref Range    T4, Free 1.16 0.82 - 1.77 ng/dL     Assessment/Plan:  Diagnoses and all orders for this visit:    Encounter for immunization    PTSD (post-traumatic stress disorder)  -     buPROPion SR (WELLBUTRIN SR) 150 mg SR tablet; Take 2 Tablets by mouth daily. Takes 2 tabs in AM, Normal, Disp-60 Tablet, R-0Please consider 90 day supplies to promote better adherence  -     METABOLIC PANEL, COMPREHENSIVE; Future  -     METABOLIC PANEL, COMPREHENSIVE    Vitamin D deficiency  -     cholecalciferol (VITAMIN D3) (5000 Units /125 mcg) capsule; Take 1 Capsule by mouth daily. , Normal, Disp-90 Capsule, R-1  -     VITAMIN D, 25 HYDROXY; Future  -     VITAMIN D, 25 HYDROXY    Intractable chronic migraine without aura and with status migrainosus  -     topiramate (TOPAMAX) 100 mg tablet; TAKE ONE TABLET BY MOUTH TWICE DAILY, Normal, Disp-60 Tablet, R-3Please consider 90 day supplies to promote better adherence  -     METABOLIC PANEL, COMPREHENSIVE; Future  -     METABOLIC PANEL, COMPREHENSIVE    Postmenopausal osteoporosis  -     DEXA BONE DENSITY STUDY AXIAL; Future    Pre-op evaluation  -     METABOLIC PANEL, COMPREHENSIVE; Future  -     CBC W/O DIFF; Future  -     METABOLIC PANEL, COMPREHENSIVE  -     CBC W/O DIFF    Frequency of urination  -     URINALYSIS W/ RFLX MICROSCOPIC; Future  -     URINALYSIS W/ RFLX MICROSCOPIC    Acquired hypothyroidism  -     TSH 3RD GENERATION; Future  -     TSH 3RD GENERATION    Other orders  -     Cancel: diazePAM (Valium) 10 mg tablet; Take 1 Tablet by mouth every six (6) hours as needed for Anxiety. Max Daily Amount: 40 mg., Normal, Disp-45 Tablet, R-0  -     Cancel: traZODone (DESYREL) 300 mg tablet; Take 1 Tablet by mouth nightly., Normal, Disp-90 Tablet, R-0      Patient Instructions   Form will be completed and faxed as soon as I get the lab results    Follow-up and Dispositions    · Return in about 3 months (around 11/2/2021) for routine follow up.

## 2021-08-03 LAB
25(OH)D3+25(OH)D2 SERPL-MCNC: 39.7 NG/ML (ref 30–100)
ALBUMIN SERPL-MCNC: 4.7 G/DL (ref 3.8–4.8)
ALBUMIN/GLOB SERPL: 2.4 {RATIO} (ref 1.2–2.2)
ALP SERPL-CCNC: 111 IU/L (ref 48–121)
ALT SERPL-CCNC: 13 IU/L (ref 0–32)
APPEARANCE UR: CLEAR
AST SERPL-CCNC: 16 IU/L (ref 0–40)
BILIRUB SERPL-MCNC: 0.3 MG/DL (ref 0–1.2)
BILIRUB UR QL STRIP: NEGATIVE
BUN SERPL-MCNC: 14 MG/DL (ref 8–27)
BUN/CREAT SERPL: 15 (ref 12–28)
CALCIUM SERPL-MCNC: 9.7 MG/DL (ref 8.7–10.3)
CHLORIDE SERPL-SCNC: 106 MMOL/L (ref 96–106)
CO2 SERPL-SCNC: 23 MMOL/L (ref 20–29)
COLOR UR: YELLOW
CREAT SERPL-MCNC: 0.94 MG/DL (ref 0.57–1)
ERYTHROCYTE [DISTWIDTH] IN BLOOD BY AUTOMATED COUNT: 12.4 % (ref 11.7–15.4)
GLOBULIN SER CALC-MCNC: 2 G/DL (ref 1.5–4.5)
GLUCOSE SERPL-MCNC: 101 MG/DL (ref 65–99)
GLUCOSE UR QL: NEGATIVE
HCT VFR BLD AUTO: 40.4 % (ref 34–46.6)
HGB BLD-MCNC: 13.3 G/DL (ref 11.1–15.9)
HGB UR QL STRIP: NEGATIVE
KETONES UR QL STRIP: NEGATIVE
LEUKOCYTE ESTERASE UR QL STRIP: NEGATIVE
MCH RBC QN AUTO: 30.4 PG (ref 26.6–33)
MCHC RBC AUTO-ENTMCNC: 32.9 G/DL (ref 31.5–35.7)
MCV RBC AUTO: 92 FL (ref 79–97)
MICRO URNS: NORMAL
NITRITE UR QL STRIP: NEGATIVE
PH UR STRIP: 6 [PH] (ref 5–7.5)
PLATELET # BLD AUTO: 236 X10E3/UL (ref 150–450)
POTASSIUM SERPL-SCNC: 4.1 MMOL/L (ref 3.5–5.2)
PROT SERPL-MCNC: 6.7 G/DL (ref 6–8.5)
PROT UR QL STRIP: NEGATIVE
RBC # BLD AUTO: 4.37 X10E6/UL (ref 3.77–5.28)
SODIUM SERPL-SCNC: 141 MMOL/L (ref 134–144)
SP GR UR: 1.01 (ref 1–1.03)
TSH SERPL DL<=0.005 MIU/L-ACNC: 2.17 UIU/ML (ref 0.45–4.5)
UROBILINOGEN UR STRIP-MCNC: 0.2 MG/DL (ref 0.2–1)
WBC # BLD AUTO: 5.4 X10E3/UL (ref 3.4–10.8)

## 2021-10-19 ENCOUNTER — TELEPHONE (OUTPATIENT)
Dept: NEUROLOGY | Age: 66
End: 2021-10-19

## 2021-10-27 ENCOUNTER — TELEPHONE (OUTPATIENT)
Dept: NEUROLOGY | Age: 66
End: 2021-10-27

## 2021-11-01 ENCOUNTER — TELEPHONE (OUTPATIENT)
Dept: NEUROLOGY | Age: 66
End: 2021-11-01

## 2021-11-01 NOTE — TELEPHONE ENCOUNTER
Called and spoke with [facility] specialty pharmacy. Patient Botox scheduled to arrive on 11/5/2021.

## 2021-11-05 ENCOUNTER — TELEPHONE (OUTPATIENT)
Dept: NEUROLOGY | Age: 66
End: 2021-11-05

## 2021-11-05 NOTE — TELEPHONE ENCOUNTER
Botox arrived on 11/5/2021.  200 units/vial  Allergen  [place]  Lot: [id number]  Exp: 06/2024  Appt: 11/18/2021

## 2021-11-18 ENCOUNTER — OFFICE VISIT (OUTPATIENT)
Dept: NEUROLOGY | Age: 66
End: 2021-11-18
Payer: MEDICAID

## 2021-11-18 VITALS — SYSTOLIC BLOOD PRESSURE: 114 MMHG | HEART RATE: 68 BPM | DIASTOLIC BLOOD PRESSURE: 68 MMHG | OXYGEN SATURATION: 96 %

## 2021-11-18 DIAGNOSIS — G43.719 INTRACTABLE CHRONIC MIGRAINE WITHOUT AURA AND WITHOUT STATUS MIGRAINOSUS: ICD-10-CM

## 2021-11-18 PROCEDURE — 64615 CHEMODENERV MUSC MIGRAINE: CPT | Performed by: PSYCHIATRY & NEUROLOGY

## 2021-11-23 NOTE — PROCEDURES
Procedure: Chemodenervation for Chronic Intractable Migraines    After consenting the patient and discussing the procedure and possible side effects. The chemodenervant was reconstituted as follows:  200 units of botox was mixed with 4ml of perservative free saline and withdrawn, using a into four sterile 1ml BD syringes. Sterile 30 gauge half inch needles were affixed to the syringes. Procedure   Chemodenervant was injected into the following muscles which were identified using their anatomical land marks. Each site was aspirated prior to injection to ensure that there was no vascular compromise. Muscle Units/inj No. Of injections Total   Trapezius BL 5 U 4 each 40 units   Cervical Paraspinals BL 5 U 2 each 20 units   Occipitalis BL 5 U 3 each 30 units   Temporalis BL 5 U 4 each 40 units    BL 5 U 1 each 10 units   Procerus 5 U 1 5 units   Frontalis BL 5 U 2 each  20 units         Total units  Waste 35 units 165     Nominal bleeding at injection sites. Patient tolerated the procedure well. No reported pain following the procedure.      botox   Lot number: [id number]  Expiration date: 3/31/2024

## 2021-11-24 ENCOUNTER — OFFICE VISIT (OUTPATIENT)
Dept: ENDOCRINOLOGY | Age: 66
End: 2021-11-24
Payer: MEDICAID

## 2021-11-24 VITALS
SYSTOLIC BLOOD PRESSURE: 155 MMHG | HEART RATE: 73 BPM | BODY MASS INDEX: 28.91 KG/M2 | HEIGHT: 69 IN | WEIGHT: 195.2 LBS | DIASTOLIC BLOOD PRESSURE: 77 MMHG

## 2021-11-24 DIAGNOSIS — E03.9 ACQUIRED HYPOTHYROIDISM: Primary | ICD-10-CM

## 2021-11-24 PROCEDURE — 99213 OFFICE O/P EST LOW 20 MIN: CPT | Performed by: INTERNAL MEDICINE

## 2021-11-24 RX ORDER — VILAZODONE HYDROCHLORIDE 40 MG/1
40 TABLET ORAL DAILY
COMMUNITY
Start: 2021-10-27

## 2021-11-24 NOTE — PROGRESS NOTES
Chief Complaint   Patient presents with    Thyroid Problem     pcp anf pharmacy confirmed     History of Present Illness: [place] is a 73 y.o. female here for follow up of hypothyroidism. \"I had been taking lithium in the past, for bipolar d/0 and it killed my thyroid. \"  At our initial visit in April 2021 her TSH was 7.5 so I increased her LT4 to 150mcg every day. Pt instructed to take her LT4 by itself, on an empty stomach with a full glass of water and wait 30 minutes before she takes any other medications or eats. At our last visit in July 2021 she was clinically and biochemically euthyroid on the LT4 150mcg daily and pt was instructed to continue this dose. She has surgery on her Right shoulder in September and right knee in June 2021. She has healed welled. She was doing PT at home. She is ambulating well. Pt denies any recent illnesses, injuries or hospitalizations. Pt has received the [employer] COVID vaccinations. Pt is taking the LT4 150mcg every day. She takes this first thing in the morning, on an empty stomach, with a full glass of water by itself and waits 30-60 minutes before she eats. Pt is still seeing Dr. Dianelys Nichols of Neurology for issues of migraines. \"He is moving to [place] and I will be seeing someone new. \"    She denies issues of palpitations, or CP, SOB, tremors, heat or cold intolerance, diarrhea or constipation. She denies issues of dysphagia, dysphonia or chocking issues. Current Outpatient Medications   Medication Sig    Viibryd 40 mg tab tablet Take 40 mg by mouth daily.  buPROPion SR (WELLBUTRIN SR) 150 mg SR tablet Take 2 Tablets by mouth daily. Takes 2 tabs in AM    cholecalciferol (VITAMIN D3) (5000 Units /125 mcg) capsule Take 1 Capsule by mouth daily.  topiramate (TOPAMAX) 100 mg tablet TAKE ONE TABLET BY MOUTH TWICE DAILY    onabotulinumtoxinA (Botox) 200 unit injection 200 units by IM route once every 12 weeks.  Inject IM neck/face, 31 FDA approved sites. Indication: Migraine prevention. DX code: G37.69    levothyroxine (SYNTHROID) 150 mcg tablet Take 1 Tab by mouth Daily (before breakfast).  naloxone (NARCAN) 4 mg/actuation nasal spray Use 1 spray intranasally, then discard. Repeat with new spray every 2 min as needed for opioid overdose symptoms, alternating nostrils.  traZODone (DESYREL) 300 mg tablet Take 300 mg by mouth nightly.  diazePAM (VALIUM) 10 mg tablet Take 10 mg by mouth every six (6) hours as needed for Anxiety. No current facility-administered medications for this visit. Allergies   Allergen Reactions    Motrin [Ibuprofen] Unknown (comments)     ulcers     Review of Systems:  - Cardiovascular: no chest pain  - Neurological: no tremors  - Integumentary: skin is normal    Physical Examination:  Blood pressure (!) 155/77, pulse 73, height 5' 8.8\" (1.748 m), weight 195 lb 3.2 oz (88.5 kg). - General: pleasant, no distress, good eye contact   - Neck: no thyromegaly or thyroid bruits  - Cardiovascular: regular, normal rate, nl s1 and s2, no m/r/g   - Integumentary: skin is normal, no edema  - Neurological: reflexes 2+ at biceps, no tremors  - Psychiatric: normal mood and affect    Data Reviewed:   - none new for review    Assessment/Plan:   1) Hypothyroidism > Pt is clinically euthyroid on LT4 150mcg daily. Will order TFTs and adjust her dose as needed. She will need 30 day refills once her labs are back. Pt voices understanding and agreement with the plan. RTC 6 months    Copy sent to:  Dr. Jessica Guallpa    Follow-up and Dispositions    · Return in about 6 months (around 5/24/2022).

## 2021-11-24 NOTE — LETTER
11/24/2021    Patient: Efra Kimbrough   YOB: 1955   Date of Visit: 11/24/2021     Heather Lomax, [address]    Dear Heather Lomax MD,      Thank you for referring Ms. Efra Kimbrough to [facility] for evaluation. My notes for this consultation are attached. If you have questions, please do not hesitate to call me. I look forward to following your patient along with you.       Sincerely,    Razia Hong MD

## 2021-11-25 LAB
T4 FREE SERPL-MCNC: 1.24 NG/DL (ref 0.82–1.77)
TSH SERPL DL<=0.005 MIU/L-ACNC: 1.84 UIU/ML (ref 0.45–4.5)

## 2021-11-29 DIAGNOSIS — E03.9 ACQUIRED HYPOTHYROIDISM: ICD-10-CM

## 2021-11-29 RX ORDER — LEVOTHYROXINE SODIUM 150 UG/1
150 TABLET ORAL
Qty: 30 TABLET | Refills: 6 | Status: SHIPPED | OUTPATIENT
Start: 2021-11-29 | End: 2022-05-27 | Stop reason: SDUPTHER

## 2022-01-12 NOTE — TELEPHONE ENCOUNTER
Patient is new to Botox. She needs to call [facility] to complete enrollment.      (I called [place] [facility] just now) Return from [place]

## 2022-02-10 DIAGNOSIS — G43.711 INTRACTABLE CHRONIC MIGRAINE WITHOUT AURA AND WITH STATUS MIGRAINOSUS: ICD-10-CM

## 2022-02-10 RX ORDER — ONABOTULINUMTOXINA 200 [USP'U]/1
INJECTION, POWDER, LYOPHILIZED, FOR SOLUTION INTRADERMAL; INTRAMUSCULAR
Qty: 1 EACH | Refills: 3 | Status: SHIPPED | OUTPATIENT
Start: 2022-02-10 | End: 2022-09-21 | Stop reason: SDUPTHER

## 2022-02-10 NOTE — TELEPHONE ENCOUNTER
Last refill for botox was done by Dr Smyth Annette. June and sent to [facility].   Therese needs it printed as [facility] is no longer doing the botox for her insurance and needs to be faxed to [employer]

## 2022-02-14 ENCOUNTER — DOCUMENTATION ONLY (OUTPATIENT)
Dept: NEUROLOGY | Age: 67
End: 2022-02-14

## 2022-02-17 ENCOUNTER — TELEPHONE (OUTPATIENT)
Dept: NEUROLOGY | Age: 67
End: 2022-02-17

## 2022-02-28 ENCOUNTER — OFFICE VISIT (OUTPATIENT)
Dept: NEUROLOGY | Age: 67
End: 2022-02-28
Payer: MEDICAID

## 2022-02-28 VITALS
HEIGHT: 68 IN | SYSTOLIC BLOOD PRESSURE: 126 MMHG | WEIGHT: 195 LBS | HEART RATE: 88 BPM | BODY MASS INDEX: 29.55 KG/M2 | TEMPERATURE: 97.4 F | DIASTOLIC BLOOD PRESSURE: 80 MMHG | RESPIRATION RATE: 16 BRPM | OXYGEN SATURATION: 98 %

## 2022-02-28 DIAGNOSIS — Z92.29 S/P BOTOX INJECTION: Primary | ICD-10-CM

## 2022-02-28 DIAGNOSIS — G43.711 INTRACTABLE CHRONIC MIGRAINE WITHOUT AURA AND WITH STATUS MIGRAINOSUS: ICD-10-CM

## 2022-02-28 PROCEDURE — 64615 CHEMODENERV MUSC MIGRAINE: CPT | Performed by: PSYCHIATRY & NEUROLOGY

## 2022-02-28 NOTE — PROCEDURES
Botox Injection Note       Indication: patient has chronic recurrent migraine. Procedure:   Botox concentration: 200 units in 4 ml of preservative-free normal saline. [address]: [id number]  Lot number:  [id number]  Expiration date: 6/24      31 sites injections, distribution as follow      Units/site  Sites Sides Subtotal    Procerus 5 1 1 5    5 1 2 10   Frontalis 5 2 2 20   Temporalis 5 4 2 40   Occipitalis 5 3 2 30   Upper cervical paraspinalis 5 2 2 20   Trapezius 5 3 2 30         200 units Botox were reconstituted, 155 units injected as above and the remainder was unavoidably wasted.      Patient tolerated procedure well.       _____________________________   Oscar Lehman NP

## 2022-02-28 NOTE — PATIENT INSTRUCTIONS
Office Policies    o Phone calls/patient messages:  Please allow up to 24 hours for someone in the office to contact you about your call or message. Be mindful your provider may be out of the office or your message may require further review. We encourage you to use [employer] for your messages as this is a faster, more efficient way to communicate with our office    o Medication Refills:  Prescription medications require up to 48 business hours to process. We encourage you to use [employer] for your refills. For controlled medications: Please allow up to 72 business hours to process. Certain medications may require you to  a written prescription at our office. NO narcotic/controlled medications will be prescribed after 4pm Monday through Friday or on weekends    o Form/Paperwork Completion:  We ask that you allow 7-14 business days. You may also download your forms to [employer] to have your doctor print off.

## 2022-02-28 NOTE — PROGRESS NOTES
Preventative medications previously tried   Topamax   Trazodone   Propranolol   Patient is under the care of mental health and is presently on Viibryd which prevents her from using any other form of antidepressant medication    Rescue medications tried   Fiorinal   Maxalt   Imitrex   Amerge    Baseline headache and migraine days: 20/month  Current headache and migraine days since starting Botox: 1-2 days per month

## 2022-03-03 ENCOUNTER — OFFICE VISIT (OUTPATIENT)
Dept: FAMILY MEDICINE CLINIC | Age: 67
End: 2022-03-03
Payer: MEDICAID

## 2022-03-03 VITALS
WEIGHT: 194 LBS | HEIGHT: 68 IN | RESPIRATION RATE: 20 BRPM | HEART RATE: 82 BPM | OXYGEN SATURATION: 98 % | BODY MASS INDEX: 29.4 KG/M2 | TEMPERATURE: 96.8 F | DIASTOLIC BLOOD PRESSURE: 64 MMHG | SYSTOLIC BLOOD PRESSURE: 121 MMHG

## 2022-03-03 DIAGNOSIS — F31.75 BIPOLAR I DISORDER, IN PARTIAL REMISSION (HCC): ICD-10-CM

## 2022-03-03 DIAGNOSIS — M81.0 POSTMENOPAUSAL OSTEOPOROSIS: ICD-10-CM

## 2022-03-03 DIAGNOSIS — Z01.818 PRE-OP EVALUATION: Primary | ICD-10-CM

## 2022-03-03 DIAGNOSIS — Z12.4 PAP SMEAR FOR CERVICAL CANCER SCREENING: ICD-10-CM

## 2022-03-03 DIAGNOSIS — Z23 ENCOUNTER FOR IMMUNIZATION: ICD-10-CM

## 2022-03-03 PROCEDURE — 90670 PCV13 VACCINE IM: CPT | Performed by: INTERNAL MEDICINE

## 2022-03-03 PROCEDURE — 99214 OFFICE O/P EST MOD 30 MIN: CPT | Performed by: INTERNAL MEDICINE

## 2022-03-03 NOTE — PATIENT INSTRUCTIONS
Pneumococcal 13-Valent Vaccine, Diphtheria Conjugate (Prevnar 15) - (By injection)   Why this medicine is used:   Prevents infections, such as pneumonia and meningitis. Contact a nurse or doctor right away if you have:  · Itching or hives, swelling in your face or hands, swelling or tingling in your mouth or throat, chest tightness, trouble breathing  · High fever     Common side effects:  · Crying, irritability, or fussiness  · Joint or muscle pain  · Mild skin rash  · Pain, burning, redness, or swelling where the shot was given  · Poor appetite  · Sleep changes  © 2017 [facility] Information is for End User's use only and may not be sold, redistributed or otherwise used for commercial purposes.

## 2022-03-03 NOTE — PROGRESS NOTES
Chief Complaint   Patient presents with    Pre-op Exam     cataract surgery 3/7/2022     HPI:  Humaira Ortiz is a 73 y.o.  female with hypothyroidism, depression and anxiety presents for Pre-op evaluation. Patient has a cataract surgery scheduled 3/7/2022. Blood pressure is at goal. She has no new complaints. Review of Systems  As per hpi    Past Medical History:   Diagnosis Date    Anxiety disorder     Breast cancer ([facility] [address]     right lumpectomy    Concussion     Depression     Fall 04/2021    Headache     Ill-defined condition     migraine HA     Ill-defined condition     concussion March 2021 post fall    Murmur     Psychotic disorder ([facility] [address]     Radiation therapy complication     late 4724 or early 2012 for radiation    Thyroid disease     2005 thyroid no longer working post lithium med     Past Surgical History:   Procedure Laterality Date    HX ANKLE FRACTURE [address]      left surgical repair 2008    HX BREAST BIOPSY Left     benign 2011    HX BREAST LUMPECTOMY Right     2011    HX HEENT      tonsillectomy    HX HYSTERECTOMY      HX ORTHOPAEDIC       left meniscus repair x 2    HX OTHER SURGICAL      barthalomew cyst rupture repair    HX ROTATOR CUFF REPAIR      right 2015     Social History     Socioeconomic History    Marital status: SINGLE   Tobacco Use    Smoking status: Never Smoker    Smokeless tobacco: Never Used   Vaping Use    Vaping Use: Never used   Substance and Sexual Activity    Alcohol use: Yes     Comment: social    Drug use: Not Currently     Types: Cocaine     Comment: \"back in the day\"    Sexual activity: Not Currently     Family History   Adopted: Yes   Problem Relation Age of Onset    Alcohol abuse Mother      Current Outpatient Medications   Medication Sig Dispense Refill    onabotulinumtoxinA (Botox) 200 unit injection 200 units by IM route once every 12 weeks. Inject IM neck/face, 31 FDA approved sites. Indication: Migraine prevention.  DX code: G37.69 1 Each 3    levothyroxine (SYNTHROID) 150 mcg tablet Take 1 Tablet by mouth Daily (before breakfast). 30 Tablet 6    Viibryd 40 mg tab tablet Take 40 mg by mouth daily.  buPROPion SR (WELLBUTRIN SR) 150 mg SR tablet Take 2 Tablets by mouth daily. Takes 2 tabs in AM 60 Tablet 0    cholecalciferol (VITAMIN D3) (5000 Units /125 mcg) capsule Take 1 Capsule by mouth daily. 90 Capsule 1    topiramate (TOPAMAX) 100 mg tablet TAKE ONE TABLET BY MOUTH TWICE DAILY 60 Tablet 3    naloxone (NARCAN) 4 mg/actuation nasal spray Use 1 spray intranasally, then discard. Repeat with new spray every 2 min as needed for opioid overdose symptoms, alternating nostrils. 2 Each 0    traZODone (DESYREL) 300 mg tablet Take 300 mg by mouth nightly.  diazePAM (VALIUM) 10 mg tablet Take 10 mg by mouth every six (6) hours as needed for Anxiety.        Allergies   Allergen Reactions    Motrin [Ibuprofen] Unknown (comments)     ulcers       Objective:  Visit Vitals  /64   Pulse 82   Temp 96.8 °F (36 °C) (Temporal)   Resp 20   Ht 5' 8\" (1.727 m)   Wt 194 lb (88 kg)   SpO2 98%   BMI 29.50 kg/m²     Physical Exam:   General appearance - alert, well appearing in no distress  Mental status - alert, oriented to person, place, and time  EYE-PERRL, EOMI  Chest - clear to auscultation, no wheezes, rales or rhonchi  Heart - normal rate, regular rhythm, no murmurs  Abdomen - soft, nontender, nondistended, no organomegaly  Ext-peripheral pulses normal, no pedal edema  Neuro -alert, oriented, normal speech, no focal findings  Back-full range of motion, no tenderness, palpable spasm or pain on motion     Results for orders placed or performed in visit on 11/24/21   TSH 3RD GENERATION   Result Value Ref Range    TSH 1.840 0.450 - 4.500 uIU/mL   T4, FREE   Result Value Ref Range    T4, Free 1.24 0.82 - 1.77 ng/dL     Assessment/Plan:  Diagnoses and all orders for this visit:    Pre-op evaluation    Encounter for immunization  - pneumococcal 13 hamilton conj dip (PREVNAR-13) 0.5 mL syrg injection; 0.5 mL by IntraMUSCular route once for 1 dose., Normal, Disp-0.5 mL, R-0  -     PNEUMOCOCCAL CONJ VACCINE 13 VALENT IM    Bipolar I disorder, in partial remission (HCC)    Postmenopausal osteoporosis  -     DEXA BONE DENSITY STUDY AXIAL; Future    Pap smear for cervical cancer screening  -     REFERRAL TO OBSTETRICS AND GYNECOLOGY      Patient Instructions      Pneumococcal 13-Valent Vaccine, Diphtheria Conjugate (Prevnar 15) - (By injection)   Why this medicine is used:   Prevents infections, such as pneumonia and meningitis. Contact a nurse or doctor right away if you have:  · Itching or hives, swelling in your face or hands, swelling or tingling in your mouth or throat, chest tightness, trouble breathing  · High fever     Common side effects:  · Crying, irritability, or fussiness  · Joint or muscle pain  · Mild skin rash  · Pain, burning, redness, or swelling where the shot was given  · Poor appetite  · Sleep changes  © 2017 [facility] Information is for End User's use only and may not be sold, redistributed or otherwise used for commercial purposes. Follow-up and Dispositions    · Return in about 3 months (around 6/3/2022) for routine follow up.

## 2022-03-16 ENCOUNTER — TELEPHONE (OUTPATIENT)
Dept: NEUROLOGY | Age: 67
End: 2022-03-16

## 2022-03-16 NOTE — TELEPHONE ENCOUNTER
VOICEMAIL    [facility] called to schedule delivery. Did not leave name of medication.  Please call [phone]

## 2022-03-18 PROBLEM — G47.10 HYPERSOMNIA: Status: ACTIVE | Noted: 2021-03-30

## 2022-03-18 PROBLEM — F31.9 BIPOLAR 1 DISORDER (HCC): Status: ACTIVE | Noted: 2018-06-19

## 2022-03-18 NOTE — TELEPHONE ENCOUNTER
Attempted to call to schedule delivery and was put on hold after the auto message and then put thru to voice mail.

## 2022-03-19 PROBLEM — N75.0 CYST OF BARTHOLIN'S GLAND DUCT: Status: ACTIVE | Noted: 2021-03-30

## 2022-03-19 PROBLEM — F98.8 ATTENTION DEFICIT DISORDER: Status: ACTIVE | Noted: 2021-03-23

## 2022-03-19 PROBLEM — E78.2 MIXED HYPERLIPIDEMIA: Status: ACTIVE | Noted: 2021-04-12

## 2022-03-19 PROBLEM — E03.9 HYPOTHYROIDISM: Status: ACTIVE | Noted: 2018-06-19

## 2022-03-19 PROBLEM — F41.9 ANXIETY: Status: ACTIVE | Noted: 2018-06-19

## 2022-03-19 PROBLEM — Z85.3 HISTORY OF MALIGNANT NEOPLASM OF BREAST: Status: ACTIVE | Noted: 2018-06-19

## 2022-03-19 PROBLEM — F41.0 PANIC DISORDER: Status: ACTIVE | Noted: 2021-03-30

## 2022-03-19 PROBLEM — F43.10 POSTTRAUMATIC STRESS DISORDER: Status: ACTIVE | Noted: 2021-03-23

## 2022-03-19 PROBLEM — K58.9 IRRITABLE BOWEL SYNDROME: Status: ACTIVE | Noted: 2021-03-30

## 2022-03-19 PROBLEM — G43.909 MIGRAINE: Status: ACTIVE | Noted: 2018-06-19

## 2022-03-19 PROBLEM — R55 SYNCOPE: Status: ACTIVE | Noted: 2021-04-12

## 2022-03-19 PROBLEM — F60.3 BORDERLINE PERSONALITY DISORDER (HCC): Status: ACTIVE | Noted: 2021-03-23

## 2022-03-23 ENCOUNTER — DOCUMENTATION ONLY (OUTPATIENT)
Dept: NEUROLOGY | Age: 67
End: 2022-03-23

## 2022-03-23 NOTE — PROGRESS NOTES
Received botox from [place] [id number] for patient appointment 5/27/2022   [employer]er[employer]  Lot number: [id number]  Expiration date: 12/31/2024  [facility] number [phone]

## 2022-04-20 ENCOUNTER — TELEPHONE (OUTPATIENT)
Dept: FAMILY MEDICINE CLINIC | Age: 67
End: 2022-04-20

## 2022-04-20 NOTE — TELEPHONE ENCOUNTER
Pt would like to know why she was referred to Cardiologist     States she got a letter from Dr Hema Amezquita number to reach her is [phone] (can leave a message if she doesn't answer)

## 2022-04-21 NOTE — TELEPHONE ENCOUNTER
Return call to patient regarding the referral she received from 's office. There are no referrals to a cardio.  Only an EKG  Order done on 3/23/2021

## 2022-04-21 NOTE — TELEPHONE ENCOUNTER
Patient called, stating she did receive a letter from Dr Hart Goes in ref to an appointment to a cardiologist and she receive a letter to audiologist    She cannot find letters

## 2022-04-29 ENCOUNTER — TELEPHONE (OUTPATIENT)
Dept: FAMILY MEDICINE CLINIC | Age: 67
End: 2022-04-29

## 2022-04-29 NOTE — TELEPHONE ENCOUNTER
----- Message from Laswapnil Marilee sent at 4/22/2022 11:41 AM EDT -----  Subject: Message to Provider    QUESTIONS  Information for Provider? pt called in about request for open labs--stts   shows they were order last year and was asking to see if they had to be   reordered ---please call to let her know if she can use the existing labs   or when new labs are ready and if can go to [facility] to   have completed  ---------------------------------------------------------------------------  --------------  [address]  What is the best way for the office to contact you? OK to leave message on   voicemail  Preferred Call Back Phone Number? [phone]  ---------------------------------------------------------------------------  --------------  SCRIPT ANSWERS  Relationship to Patient?  Self

## 2022-05-09 ENCOUNTER — HOSPITAL ENCOUNTER (OUTPATIENT)
Dept: MAMMOGRAPHY | Age: 67
Discharge: HOME | End: 2022-05-09
Attending: INTERNAL MEDICINE
Payer: MEDICAID

## 2022-05-09 DIAGNOSIS — M81.0 POSTMENOPAUSAL OSTEOPOROSIS: ICD-10-CM

## 2022-05-09 PROCEDURE — 77080 DXA BONE DENSITY AXIAL: CPT

## 2022-05-24 ENCOUNTER — OFFICE VISIT (OUTPATIENT)
Dept: ENDOCRINOLOGY | Age: 67
End: 2022-05-24
Payer: MEDICAID

## 2022-05-24 ENCOUNTER — TELEPHONE (OUTPATIENT)
Dept: NEUROLOGY | Age: 67
End: 2022-05-24

## 2022-05-24 VITALS
SYSTOLIC BLOOD PRESSURE: 129 MMHG | DIASTOLIC BLOOD PRESSURE: 71 MMHG | WEIGHT: 198 LBS | HEART RATE: 77 BPM | HEIGHT: 68 IN | BODY MASS INDEX: 30.01 KG/M2

## 2022-05-24 DIAGNOSIS — E55.9 HYPOVITAMINOSIS D: ICD-10-CM

## 2022-05-24 DIAGNOSIS — E03.9 ACQUIRED HYPOTHYROIDISM: Primary | ICD-10-CM

## 2022-05-24 DIAGNOSIS — E78.2 MIXED HYPERLIPIDEMIA: ICD-10-CM

## 2022-05-24 PROCEDURE — 99214 OFFICE O/P EST MOD 30 MIN: CPT | Performed by: INTERNAL MEDICINE

## 2022-05-24 RX ORDER — MELOXICAM 15 MG/1
15 TABLET ORAL DAILY
COMMUNITY
Start: 2022-05-23 | End: 2022-07-20 | Stop reason: ALTCHOICE

## 2022-05-24 NOTE — LETTER
2022    Patient: Gwendolyn Timmons   YOB: 1955   Date of Visit: 2022     Teresa Kendall, [address]    Dear Teresa Kendall MD,      Thank you for referring Ms. Gwendolyn Timmons to [facility] for evaluation. My notes for this consultation are attached. If you have questions, please do not hesitate to call me. I look forward to following your patient along with you.       Sincerely,    Ollen Apgar, MD

## 2022-05-24 NOTE — PROGRESS NOTES
Chief Complaint   Patient presents with    Thyroid Problem     pcp and pharmacy verified     History of Present Illness: [place] is a 78 y.o. female here for follow up of hypothyroidism. \"I had been taking lithium in the past, for bipolar d/0 and it killed my thyroid. \"  At our initial visit in April 2021 her TSH was 7.5 so I increased her LT4 to 150mcg every day. Pt instructed to take her LT4 by itself, on an empty stomach with a full glass of water and wait 30 minutes before she takes any other medications or eats. At our last visit in November 2021 she was clinically and biochemically euthyroid on the LT4 150mcg daily and pt was instructed to continue this dose. \"My boyfriend has had 12 surgeries in the last year and a half. He has something wrong with his esophageus, they found 3 cancers in his esophageus. He was exposed to agent orange. My therapist said I need to get out more and not stay in the the house all day long. I am losing my bubbiness. \"    She has surgery on her Right shoulder in September and right knee in June 2021. She has healed welled. She was doing PT at home. She is ambulating well. She had cataract surgery in April 2022. \"The left eye is healing well and the right eye is healing more slowly\". Pt has received the [employer] COVID vaccinations and two Pfizer boosters. Pt is taking the LT4 150mcg every day. She takes this first thing in the morning, on an empty stomach, with a full glass of water by itself and waits 30-60 minutes before she eats. Since Dr. Kavon Long moved I don't like the new Neurologist I need to find a new Neurologist.     She denies issues of palpitations, or CP, SOB, tremors, heat or cold intolerance, diarrhea or constipation. She denies issues of dysphagia, dysphonia or chocking issues. Since I have not been as physically active I have gained weight and I need labs drawn.     Current Outpatient Medications   Medication Sig    meloxicam (MOBIC) 15 mg tablet Take 15 mg by mouth daily.  onabotulinumtoxinA (Botox) 200 unit injection 200 units by IM route once every 12 weeks. Inject IM neck/face, 31 FDA approved sites. Indication: Migraine prevention. DX code: G37.69    levothyroxine (SYNTHROID) 150 mcg tablet Take 1 Tablet by mouth Daily (before breakfast).  Viibryd 40 mg tab tablet Take 40 mg by mouth daily.  buPROPion SR (WELLBUTRIN SR) 150 mg SR tablet Take 2 Tablets by mouth daily. Takes 2 tabs in AM    cholecalciferol (VITAMIN D3) (5000 Units /125 mcg) capsule Take 1 Capsule by mouth daily.  topiramate (TOPAMAX) 100 mg tablet TAKE ONE TABLET BY MOUTH TWICE DAILY    naloxone (NARCAN) 4 mg/actuation nasal spray Use 1 spray intranasally, then discard. Repeat with new spray every 2 min as needed for opioid overdose symptoms, alternating nostrils.  traZODone (DESYREL) 300 mg tablet Take 300 mg by mouth nightly.  diazePAM (VALIUM) 10 mg tablet Take 10 mg by mouth every six (6) hours as needed for Anxiety. No current facility-administered medications for this visit. Allergies   Allergen Reactions    Motrin [Ibuprofen] Unknown (comments)     ulcers     Review of Systems:  - Cardiovascular: no chest pain  - Neurological: no tremors  - Integumentary: skin is normal    Physical Examination:  Blood pressure 129/71, pulse 77, height 5' 8\" (1.727 m), weight 198 lb (89.8 kg). - General: pleasant, no distress, good eye contact   - Neck: no thyromegaly or thyroid bruits  - Cardiovascular: regular, normal rate, nl s1 and s2, no m/r/g   - Integumentary: skin is normal, no edema  - Neurological: reflexes 2+ at biceps, no tremors  - Psychiatric: normal mood and affect    Data Reviewed:   - none new for review    Assessment/Plan:   1) Hypothyroidism > Pt is clinically euthyroid on LT4 150mcg daily. Will order TFTs and adjust her dose as needed. She will need 30 day refills once her labs are back.     2) Weight Gain > I will order CBC, CMP, lipid panel and A1C today. 3) HLD > She has hx of HLD, she is not taking any anti-lipid medications, she is asking we test her lipid levels. 4) Hypovitaminosis D > She is taking Vitamin D 5000 units per day, will order a vitamin D level. Pt voices understanding and agreement with the plan. RTC 6 months    Copy sent to:  Dr. Cornelia Beltre    Follow-up and Dispositions    · Return in about 6 months (around 11/24/2022).

## 2022-05-24 NOTE — TELEPHONE ENCOUNTER
Pt wishes to cancel her Botox on Friday because she is not happy with Arden Osullivan, she would like a call back to schedule a Botox appt with Yany lopez.       [phone]

## 2022-05-26 LAB
25(OH)D3+25(OH)D2 SERPL-MCNC: 34.2 NG/ML (ref 30–100)
ALBUMIN SERPL-MCNC: 5 G/DL (ref 3.8–4.8)
ALBUMIN/GLOB SERPL: 2 {RATIO} (ref 1.2–2.2)
ALP SERPL-CCNC: 102 IU/L (ref 44–121)
ALT SERPL-CCNC: 22 IU/L (ref 0–32)
AST SERPL-CCNC: 16 IU/L (ref 0–40)
BILIRUB SERPL-MCNC: 0.4 MG/DL (ref 0–1.2)
BUN SERPL-MCNC: 13 MG/DL (ref 8–27)
BUN/CREAT SERPL: 12 (ref 12–28)
CALCIUM SERPL-MCNC: 10.2 MG/DL (ref 8.7–10.3)
CHLORIDE SERPL-SCNC: 100 MMOL/L (ref 96–106)
CHOLEST SERPL-MCNC: 249 MG/DL (ref 100–199)
CO2 SERPL-SCNC: 22 MMOL/L (ref 20–29)
CREAT SERPL-MCNC: 1.05 MG/DL (ref 0.57–1)
EGFR: 59 ML/MIN/1.73
ERYTHROCYTE [DISTWIDTH] IN BLOOD BY AUTOMATED COUNT: 12 % (ref 11.7–15.4)
GLOBULIN SER CALC-MCNC: 2.5 G/DL (ref 1.5–4.5)
GLUCOSE SERPL-MCNC: 96 MG/DL (ref 65–99)
HCT VFR BLD AUTO: 46.1 % (ref 34–46.6)
HDLC SERPL-MCNC: 68 MG/DL
HGB BLD-MCNC: 14.8 G/DL (ref 11.1–15.9)
IMP & REVIEW OF LAB RESULTS: NORMAL
INTERPRETATION: NORMAL
LDLC SERPL CALC-MCNC: 148 MG/DL (ref 0–99)
MCH RBC QN AUTO: 30.2 PG (ref 26.6–33)
MCHC RBC AUTO-ENTMCNC: 32.1 G/DL (ref 31.5–35.7)
MCV RBC AUTO: 94 FL (ref 79–97)
PLATELET # BLD AUTO: 261 X10E3/UL (ref 150–450)
POTASSIUM SERPL-SCNC: 5.1 MMOL/L (ref 3.5–5.2)
PROT SERPL-MCNC: 7.5 G/DL (ref 6–8.5)
RBC # BLD AUTO: 4.9 X10E6/UL (ref 3.77–5.28)
SODIUM SERPL-SCNC: 139 MMOL/L (ref 134–144)
T4 FREE SERPL-MCNC: 1.04 NG/DL (ref 0.82–1.77)
TRIGL SERPL-MCNC: 185 MG/DL (ref 0–149)
TSH SERPL DL<=0.005 MIU/L-ACNC: 2.21 UIU/ML (ref 0.45–4.5)
VLDLC SERPL CALC-MCNC: 33 MG/DL (ref 5–40)
WBC # BLD AUTO: 5.9 X10E3/UL (ref 3.4–10.8)

## 2022-05-26 NOTE — TELEPHONE ENCOUNTER
I am perfectly fine with changing providers check with Maddie Lorenvincent to make sure she is comfortable with that and she will have to rewrite the prescription for Botox and her name and let Carloz Heller know that she will need to get new authorization

## 2022-05-27 ENCOUNTER — TELEPHONE (OUTPATIENT)
Dept: ENDOCRINOLOGY | Age: 67
End: 2022-05-27

## 2022-05-27 DIAGNOSIS — E03.9 ACQUIRED HYPOTHYROIDISM: ICD-10-CM

## 2022-05-27 RX ORDER — LEVOTHYROXINE SODIUM 150 UG/1
150 TABLET ORAL
Qty: 30 TABLET | Refills: 6 | Status: SHIPPED | OUTPATIENT
Start: 2022-05-27

## 2022-05-27 NOTE — TELEPHONE ENCOUNTER
Spoke with pt regarding her labs. Her TFTs were good. Pt to continue the LT4 150mcg per day. Her Lipid panel was high, I counseled her on lowering her fat/cholesterol intake. Pt voices understanding and agreement with the plan.

## 2022-05-27 NOTE — TELEPHONE ENCOUNTER
Lvm for patient that Tariq Laird is fine with her switching to another day for botox. Asked patient to call me and let me know if that is ok for 6/15 for her botox because that is her procedure day.

## 2022-05-31 ENCOUNTER — TELEPHONE (OUTPATIENT)
Dept: ENDOCRINOLOGY | Age: 67
End: 2022-05-31

## 2022-05-31 NOTE — TELEPHONE ENCOUNTER
5/31/2022  3:14 PM    Patient called and left message on machine at 3.00 stating that she missed a call about blood work,.  Please call her back at [phone] thanks

## 2022-06-06 ENCOUNTER — OFFICE VISIT (OUTPATIENT)
Dept: FAMILY MEDICINE CLINIC | Age: 67
End: 2022-06-06

## 2022-06-06 VITALS
RESPIRATION RATE: 20 BRPM | BODY MASS INDEX: 30.01 KG/M2 | OXYGEN SATURATION: 98 % | WEIGHT: 198 LBS | SYSTOLIC BLOOD PRESSURE: 119 MMHG | HEART RATE: 89 BPM | TEMPERATURE: 98.6 F | DIASTOLIC BLOOD PRESSURE: 65 MMHG | HEIGHT: 68 IN

## 2022-06-06 DIAGNOSIS — Z12.31 ENCOUNTER FOR SCREENING MAMMOGRAM FOR BREAST CANCER: ICD-10-CM

## 2022-06-06 DIAGNOSIS — G43.711 INTRACTABLE CHRONIC MIGRAINE WITHOUT AURA AND WITH STATUS MIGRAINOSUS: ICD-10-CM

## 2022-06-06 DIAGNOSIS — R73.03 BORDERLINE DIABETES MELLITUS: ICD-10-CM

## 2022-06-06 DIAGNOSIS — R35.0 FREQUENCY OF URINATION: ICD-10-CM

## 2022-06-06 DIAGNOSIS — S92.411D CLOSED DISPLACED FRACTURE OF PROXIMAL PHALANX OF RIGHT GREAT TOE WITH ROUTINE HEALING, SUBSEQUENT ENCOUNTER: Primary | ICD-10-CM

## 2022-06-06 DIAGNOSIS — E55.9 VITAMIN D DEFICIENCY: ICD-10-CM

## 2022-06-06 PROCEDURE — 1123F ACP DISCUSS/DSCN MKR DOCD: CPT | Performed by: INTERNAL MEDICINE

## 2022-06-06 PROCEDURE — 99214 OFFICE O/P EST MOD 30 MIN: CPT | Performed by: INTERNAL MEDICINE

## 2022-06-06 RX ORDER — ACETAMINOPHEN AND CODEINE PHOSPHATE 300; 30 MG/1; MG/1
1 TABLET ORAL
Qty: 10 TABLET | Refills: 0 | Status: SHIPPED | OUTPATIENT
Start: 2022-06-06 | End: 2022-06-09

## 2022-06-06 NOTE — PROGRESS NOTES
Chief Complaint   Patient presents with    Follow-up     3 MONTH ROUTINE CHECK UP      HPI:  [place] is a 78 y.o. female hypothyroidism, depression and anxiety presents for 3 month follow up follow-up   Patient sustained a fracture of right proximal phalanx. She has appointment with Orthopedic on Friday    Review of Systems  As per hpi    Past Medical History:   Diagnosis Date    Anxiety disorder     [facility] ([facility] [address]     right lumpectomy    Concussion     Depression     Fall 04/2021    Headache     Ill-defined condition     migraine HA     Ill-defined condition     concussion March 2021 post fall    Murmur     Psychotic disorder ([facility] [address]     Radiation therapy complication     late 6289 or early 2012 for radiation    Thyroid disease     2005 thyroid no longer working post lithium med     Past Surgical History:   Procedure Laterality Date    HX ANKLE FRACTURE [address]      left surgical repair 2008    HX BREAST BIOPSY Left     benign 2011    HX BREAST LUMPECTOMY Right     2011    HX HEENT      tonsillectomy    HX HYSTERECTOMY      HX ORTHOPAEDIC       left meniscus repair x 2    HX OTHER SURGICAL      barthalomew cyst rupture repair    HX ROTATOR CUFF REPAIR      right 2015     Social History     Socioeconomic History    Marital status: SINGLE   Tobacco Use    Smoking status: Never Smoker    Smokeless tobacco: Never Used   Vaping Use    Vaping Use: Never used   Substance and Sexual Activity    Alcohol use: Yes     Comment: social    Drug use: Not Currently     Types: Cocaine     Comment: \"back in the day\"    Sexual activity: Not Currently     Family History   Adopted: Yes   Problem Relation Age of Onset    Alcohol abuse Mother      Current Outpatient Medications   Medication Sig Dispense Refill    levothyroxine (SYNTHROID) 150 mcg tablet Take 1 Tablet by mouth Daily (before breakfast). 30 Tablet 6    meloxicam (MOBIC) 15 mg tablet Take 15 mg by mouth daily.       onabotulinumtoxinA (Botox) 200 unit injection 200 units by IM route once every 12 weeks. Inject IM neck/face, 31 FDA approved sites. Indication: Migraine prevention. DX code: G43.71 1 Each 3    Viibryd 40 mg tab tablet Take 40 mg by mouth daily.  buPROPion SR (WELLBUTRIN SR) 150 mg SR tablet Take 2 Tablets by mouth daily. Takes 2 tabs in AM 60 Tablet 0    cholecalciferol (VITAMIN D3) (5000 Units /125 mcg) capsule Take 1 Capsule by mouth daily. 90 Capsule 1    topiramate (TOPAMAX) 100 mg tablet TAKE ONE TABLET BY MOUTH TWICE DAILY 60 Tablet 3    naloxone (NARCAN) 4 mg/actuation nasal spray Use 1 spray intranasally, then discard. Repeat with new spray every 2 min as needed for opioid overdose symptoms, alternating nostrils. 2 Each 0    traZODone (DESYREL) 300 mg tablet Take 300 mg by mouth nightly.  diazePAM (VALIUM) 10 mg tablet Take 10 mg by mouth every six (6) hours as needed for Anxiety.        Allergies   Allergen Reactions    Motrin [Ibuprofen] Unknown (comments)     ulcers       Objective:  Visit Vitals  /65   Pulse 89   Temp 98.6 °F (37 °C) (Temporal)   Resp 20   Ht 5' 8\" (1.727 m)   Wt 198 lb (89.8 kg)   SpO2 98%   BMI 30.11 kg/m²     Physical Exam:   General appearance - alert, well appearing in no distress  Mental status - alert, oriented to person, place, and time  Neck - supple, no significant adenopathy   Chest - clear to auscultation, no wheezes, rales or rhonchi  Heart - normal rate, regular rhythm, no murmurs  Abdomen - soft, nontender, nondistended, no organomegaly  Ext-peripheral pulses normal, no pedal edema  Neuro - no focal findings   Back-full range of motion, no tenderness, palpable spasm or pain on motion     Results for orders placed or performed in visit on 21/53/60   METABOLIC PANEL, COMPREHENSIVE   Result Value Ref Range    Glucose 96 65 - 99 mg/dL    BUN 13 8 - 27 mg/dL    Creatinine 1.05 (H) 0.57 - 1.00 mg/dL    eGFR 59 (L) >59 mL/min/1.73    BUN/Creatinine ratio 12 12 - 28    Sodium 139 134 - 144 mmol/L    Potassium 5.1 3.5 - 5.2 mmol/L    Chloride 100 96 - 106 mmol/L    CO2 22 20 - 29 mmol/L    Calcium 10.2 8.7 - 10.3 mg/dL    Protein, total 7.5 6.0 - 8.5 g/dL    Albumin 5.0 (H) 3.8 - 4.8 g/dL    GLOBULIN, TOTAL 2.5 1.5 - 4.5 g/dL    A-G Ratio 2.0 1.2 - 2.2    Bilirubin, total 0.4 0.0 - 1.2 mg/dL    Alk. phosphatase 102 44 - 121 IU/L    AST (SGOT) 16 0 - 40 IU/L    ALT (SGPT) 22 0 - 32 IU/L   CBC W/O DIFF   Result Value Ref Range    WBC 5.9 3.4 - 10.8 x10E3/uL    RBC 4.90 3.77 - 5.28 x10E6/uL    HGB 14.8 11.1 - 15.9 g/dL    HCT 46.1 34.0 - 46.6 %    MCV 94 79 - 97 fL    MCH 30.2 26.6 - 33.0 pg    MCHC 32.1 31.5 - 35.7 g/dL    RDW 12.0 11.7 - 15.4 %    PLATELET 170 450 - 177 x10E3/uL   LIPID PANEL   Result Value Ref Range    Cholesterol, total 249 (H) 100 - 199 mg/dL    Triglyceride 185 (H) 0 - 149 mg/dL    HDL Cholesterol 68 >39 mg/dL    VLDL, calculated 33 5 - 40 mg/dL    LDL, calculated 148 (H) 0 - 99 mg/dL   CVD REPORT   Result Value Ref Range    INTERPRETATION Note    CKD REPORT   Result Value Ref Range    Interpretation Note    TSH 3RD GENERATION   Result Value Ref Range    TSH 2.210 0.450 - 4.500 uIU/mL   VITAMIN D, 25 HYDROXY   Result Value Ref Range    VITAMIN D, 25-HYDROXY 34.2 30.0 - 100.0 ng/mL   T4, FREE   Result Value Ref Range    T4, Free 1.04 0.82 - 1.77 ng/dL     Assessment/Plan:  Diagnoses and all orders for this visit:    Closed displaced fracture of proximal phalanx of right great toe with routine healing, subsequent encounter  -     acetaminophen-codeine (Tylenol-Codeine #3) 300-30 mg per tablet; Take 1 Tablet by mouth every four (4) hours as needed for Pain for up to 3 days. Max Daily Amount: 6 Tablets. Take 1 Tab by mouth every four (4) hours as needed for Pain for up to 3 days.  Max Daily Amount: 6 Tabs., Normal, Disp-10 Tablet, R-0    Vitamin D deficiency  -     VITAMIN D, 25 HYDROXY; Future  -     VITAMIN D, 25 HYDROXY    Encounter for screening mammogram for breast cancer  -     FILEMON MAMMO BI SCREENING INCL CAD; Future    Frequency of urination  -     URINALYSIS W/ RFLX MICROSCOPIC; Future  -     URINALYSIS W/ RFLX MICROSCOPIC    Borderline diabetes mellitus  -     HEMOGLOBIN A1C WITH EAG; Future  -     HEMOGLOBIN A1C WITH EAG    Intractable chronic migraine without aura and with status migrainosus  -     REFERRAL TO NEUROLOGY      Patient Instructions          Broken Toe: Care Instructions  Your Care Instructions  You have broken (fractured) a bone in your toe. This kind of fracture does not need a special cast or brace. \"Ministerio-taping\" your broken toe to a healthy toe next to it is almost always enough to treat the problem and ease symptoms. The toe may take 4 weeks or more to heal.  You heal best when you take good care of yourself. Eat a variety of healthy foods, and don't smoke. Follow-up care is a key part of your treatment and safety. Be sure to make and go to all appointments, and call your doctor if you are having problems. It's also a good idea to know your test results and keep a list of the medicines you take. How can you care for yourself at home? · Be safe with medicines. Take pain medicines exactly as directed. ? If the doctor gave you a prescription medicine for pain, take it as prescribed. ? If you are not taking a prescription pain medicine, ask your doctor if you can take an over-the-counter medicine. · If your toe is taped to the toe next to it, your doctor has shown you how to change the tape. Protect the skin by putting something soft, such as felt or foam, between your toes before you tape them together. Never tape the toes together skin-to-skin. Your broken toe may need to be ministerio-taped for 2 to 4 weeks to heal.  · Rest and protect your toe. Do not walk on it until you can do so without too much pain. If the doctor has told you to use crutches, use them as instructed. · Put ice or a cold pack on your toe for 10 to 20 minutes at a time.  Try to do this every 1 to 2 hours for the next 3 days (when you are awake) or until the swelling goes down. Put a thin cloth between the ice and your skin. · Prop up your foot on a pillow when you ice it or anytime you sit or lie down. Try to keep it above the level of your heart. This will help reduce swelling. · Make sure you go to your follow-up appointments. Your doctor will need to check that your toe is healing right. When should you call for help? Call your doctor now or seek immediate medical care if:    · You have severe pain.     · Your toe is cool or pale or changes color.     · You have tingling, weakness, or numbness in your toe. Watch closely for changes in your health, and be sure to contact your doctor if:    · Pain and swelling get worse.     · You are not getting better as expected. Where can you learn more? Go to http://www.gray.com/  Enter [id number] in the search box to learn more about \"Broken Toe: Care Instructions. \"  Current as of: July 1, 2021               Content Version: 13.2  © 3361-9175 [employer]. Care instructions adapted under license by [employer] (which disclaims liability or warranty for this information). If you have questions about a medical condition or this instruction, always ask your healthcare professional. [address] any warranty or liability for your use of this information. Follow-up and Dispositions    · Return in about 3 months (around 9/6/2022), or if symptoms worsen or fail to improve, for routine follow up.

## 2022-06-06 NOTE — PATIENT INSTRUCTIONS
Broken Toe: Care Instructions  Your Care Instructions  You have broken (fractured) a bone in your toe. This kind of fracture does not need a special cast or brace. \"Ministerio-taping\" your broken toe to a healthy toe next to it is almost always enough to treat the problem and ease symptoms. The toe may take 4 weeks or more to heal.  You heal best when you take good care of yourself. Eat a variety of healthy foods, and don't smoke. Follow-up care is a key part of your treatment and safety. Be sure to make and go to all appointments, and call your doctor if you are having problems. It's also a good idea to know your test results and keep a list of the medicines you take. How can you care for yourself at home? · Be safe with medicines. Take pain medicines exactly as directed. ? If the doctor gave you a prescription medicine for pain, take it as prescribed. ? If you are not taking a prescription pain medicine, ask your doctor if you can take an over-the-counter medicine. · If your toe is taped to the toe next to it, your doctor has shown you how to change the tape. Protect the skin by putting something soft, such as felt or foam, between your toes before you tape them together. Never tape the toes together skin-to-skin. Your broken toe may need to be ministerio-taped for 2 to 4 weeks to heal.  · Rest and protect your toe. Do not walk on it until you can do so without too much pain. If the doctor has told you to use crutches, use them as instructed. · Put ice or a cold pack on your toe for 10 to 20 minutes at a time. Try to do this every 1 to 2 hours for the next 3 days (when you are awake) or until the swelling goes down. Put a thin cloth between the ice and your skin. · Prop up your foot on a pillow when you ice it or anytime you sit or lie down. Try to keep it above the level of your heart. This will help reduce swelling. · Make sure you go to your follow-up appointments.  Your doctor will need to check that your toe is healing right. When should you call for help? Call your doctor now or seek immediate medical care if:    · You have severe pain.     · Your toe is cool or pale or changes color.     · You have tingling, weakness, or numbness in your toe. Watch closely for changes in your health, and be sure to contact your doctor if:    · Pain and swelling get worse.     · You are not getting better as expected. Where can you learn more? Go to http://www.gray.com/  Enter [id number] in the search box to learn more about \"Broken Toe: Care Instructions. \"  Current as of: July 1, 2021               Content Version: 13.2  © 0046-0646 [employer]. Care instructions adapted under license by [employer] (which disclaims liability or warranty for this information). If you have questions about a medical condition or this instruction, always ask your healthcare professional. [address] any warranty or liability for your use of this information.

## 2022-06-08 ENCOUNTER — TELEPHONE (OUTPATIENT)
Dept: NEUROLOGY | Age: 67
End: 2022-06-08

## 2022-06-10 ENCOUNTER — DOCUMENTATION ONLY (OUTPATIENT)
Dept: NEUROLOGY | Age: 67
End: 2022-06-10

## 2022-06-10 NOTE — PROGRESS NOTES
Received from [address] [place] [address] botox for patient 8/19/2022 appointment  :  [employer]  Lot number: [id number]  Expiration date: 01/31/2025  ndc number: [id number]

## 2022-06-11 LAB
25(OH)D3+25(OH)D2 SERPL-MCNC: 36.3 NG/ML (ref 30–100)
APPEARANCE UR: CLEAR
BACTERIA #/AREA URNS HPF: ABNORMAL /[HPF]
BILIRUB UR QL STRIP: NEGATIVE
CASTS URNS QL MICRO: ABNORMAL /LPF
COLOR UR: YELLOW
CRYSTALS URNS MICRO: ABNORMAL
EPI CELLS #/AREA URNS HPF: ABNORMAL /HPF (ref 0–10)
EST. AVERAGE GLUCOSE BLD GHB EST-MCNC: 111 MG/DL
GLUCOSE UR QL STRIP: NEGATIVE
HBA1C MFR BLD: 5.5 % (ref 4.8–5.6)
HGB UR QL STRIP: NEGATIVE
KETONES UR QL STRIP: ABNORMAL
LEUKOCYTE ESTERASE UR QL STRIP: ABNORMAL
MICRO URNS: ABNORMAL
NITRITE UR QL STRIP: NEGATIVE
PH UR STRIP: 5.5 [PH] (ref 5–7.5)
PROT UR QL STRIP: NEGATIVE
RBC #/AREA URNS HPF: ABNORMAL /HPF (ref 0–2)
SP GR UR STRIP: 1.02 (ref 1–1.03)
UNIDENT CRYS URNS QL MICRO: PRESENT
UROBILINOGEN UR STRIP-MCNC: 0.2 MG/DL (ref 0.2–1)
WBC #/AREA URNS HPF: ABNORMAL /HPF (ref 0–5)

## 2022-06-28 ENCOUNTER — HOSPITAL ENCOUNTER (OUTPATIENT)
Dept: MAMMOGRAPHY | Age: 67
Discharge: HOME | End: 2022-06-28
Attending: INTERNAL MEDICINE
Payer: MEDICAID

## 2022-06-28 DIAGNOSIS — Z12.31 ENCOUNTER FOR SCREENING MAMMOGRAM FOR BREAST CANCER: ICD-10-CM

## 2022-06-28 PROCEDURE — 77067 SCR MAMMO BI INCL CAD: CPT

## 2022-07-20 ENCOUNTER — OFFICE VISIT (OUTPATIENT)
Dept: FAMILY MEDICINE CLINIC | Age: 67
End: 2022-07-20
Payer: MEDICAID

## 2022-07-20 VITALS
HEART RATE: 81 BPM | SYSTOLIC BLOOD PRESSURE: 123 MMHG | OXYGEN SATURATION: 98 % | BODY MASS INDEX: 30.62 KG/M2 | WEIGHT: 202 LBS | TEMPERATURE: 97.1 F | HEIGHT: 68 IN | RESPIRATION RATE: 20 BRPM | DIASTOLIC BLOOD PRESSURE: 74 MMHG

## 2022-07-20 DIAGNOSIS — E55.9 VITAMIN D DEFICIENCY: ICD-10-CM

## 2022-07-20 DIAGNOSIS — N18.30 STAGE 3 CHRONIC KIDNEY DISEASE, UNSPECIFIED WHETHER STAGE 3A OR 3B CKD (HCC): ICD-10-CM

## 2022-07-20 DIAGNOSIS — M85.80 OSTEOPENIA, UNSPECIFIED LOCATION: Primary | ICD-10-CM

## 2022-07-20 PROBLEM — R06.00 DYSPNEA: Status: ACTIVE | Noted: 2022-07-20

## 2022-07-20 PROCEDURE — 99214 OFFICE O/P EST MOD 30 MIN: CPT | Performed by: INTERNAL MEDICINE

## 2022-07-20 PROCEDURE — 1123F ACP DISCUSS/DSCN MKR DOCD: CPT | Performed by: INTERNAL MEDICINE

## 2022-07-20 RX ORDER — MULTIVITAMIN
1 TABLET ORAL DAILY
Status: DISCONTINUED | OUTPATIENT
Start: 2022-07-21 | End: 2022-09-20

## 2022-07-20 NOTE — PROGRESS NOTES
1. \"Have you been to the ER, urgent care clinic since your last visit? Hospitalized since your last visit? \" Yes When: July 7, 2022  Where: [facility]  Reason for visit: fall     2. \"Have you seen or consulted any other health care providers outside of the [address] [place] [address] since your last visit? \" NO    3. For patients aged 39-70: Has the patient had a colonoscopy / FIT/ Cologuard? YES      If the patient is female:    4. For patients aged 41-77: Has the patient had a mammogram within the past 2 years? YES  June 28 , 2022 [place] [facility]       5. For patients aged 21-65: Has the patient had a pap smear?  Yes - no Care Gap present YES June 17 2022

## 2022-07-20 NOTE — LETTER
7/20/2022    Ms. [address] [facility]  [facility]      Dear [address] [place] [address]:    Please find your most recent results below. 2 + leuk, otherwise rest of results are normal  Resulted Orders   VITAMIN D, 25 HYDROXY   Result Value Ref Range    VITAMIN D, 25-HYDROXY 36.3 30.0 - 100.0 ng/mL    Narrative    Performed at:  [address]  [id number]  : Nyasia Coto MD, Phone:  [phone]   URINALYSIS W/ RFLX MICROSCOPIC   Result Value Ref Range    Specific Gravity 1.022 1.005 - 1.030    pH (UA) 5.5 5.0 - 7.5    Color Yellow Yellow    Appearance Clear Clear    Leukocyte Esterase 2+ (A) Negative    Protein Negative Negative/Trace    Glucose Negative Negative    Ketone Trace (A) Negative    Blood Negative Negative    Bilirubin Negative Negative    Urobilinogen 0.2 0.2 - 1.0 mg/dL    Nitrites Negative Negative    Microscopic Examination See additional order     Narrative    Performed at:  [address]  [id number]  : Nyasia Coto MD, Phone:  [phone]   HEMOGLOBIN A1C WITH EAG   Result Value Ref Range    Hemoglobin A1c 5.5 4.8 - 5.6 %    Estimated average glucose 111 mg/dL    Narrative    Performed at:  [address]  [id number]  : Nyasia Coto MD, Phone:  [phone]   MICROSCOPIC EXAMINATION   Result Value Ref Range    WBC 0-5 0 - 5 /hpf    RBC None seen 0 - 2 /hpf    Epithelial cells 0-10 0 - 10 /hpf    Casts None seen None seen /lpf    Crystals Present (A) N/A    Crystal type Calcium Oxalate N/A    Bacteria Moderate (A) None seen/Few    Narrative    Performed at:  [facility] [address]  [id number]  : Nyasia Coto MD, Phone:  [phone]       RECOMMENDATIONS:  None. Keep up the good work! Make sure you are taking 500mg of Calcium with Vitamin D twice a day.   Continue with current medications.     Please call me if you have any questions: [phone]    Sincerely,      Francia Quesada MD

## 2022-07-20 NOTE — PROGRESS NOTES
Chief Complaint   Patient presents with    Hospital Follow Up     Patient had fall and was seen at [facility] / patient have appt with cardio 7/21/2022         HPI:   Myesha Marie is a 78 y.o.  female with medical history below presents for follow up. Patient had a fall and was seen at [facility] ER. She has an appoint ment with cardio 7/21/2022//. Bone density results showed osteopenia. Vit D and calcium supplement use has been encouraged.     Review of Systems  As per hpi    Past Medical History:   Diagnosis Date    Anxiety disorder     Breast cancer ([facility] [address]     right lumpectomy    Concussion     Depression     Fall 04/2021    Headache     Ill-defined condition     migraine HA     Ill-defined condition     concussion March 2021 post fall    Murmur     Psychotic disorder [facility])     Radiation therapy complication     late 8771 or early 2012 for radiation    Thyroid disease     2005 thyroid no longer working post lithium med     Past Surgical History:   Procedure Laterality Date    HX ANKLE FRACTURE TX      left surgical repair 2008    HX BREAST BIOPSY Left     benign 2011    HX BREAST LUMPECTOMY Right     2011    HX HEENT      tonsillectomy    HX HYSTERECTOMY      HX ORTHOPAEDIC       left meniscus repair x 2    HX OTHER SURGICAL      barthalomew cyst rupture repair    HX ROTATOR CUFF REPAIR      right 2015     Social History     Socioeconomic History    Marital status: SINGLE   Tobacco Use    Smoking status: Never    Smokeless tobacco: Never   Vaping Use    Vaping Use: Never used   Substance and Sexual Activity    Alcohol use: Yes     Comment: social    Drug use: Not Currently     Types: Cocaine     Comment: \"back in the day\"    Sexual activity: Not Currently     Family History   Adopted: Yes   Problem Relation Age of Onset    Alcohol abuse Mother      Current Outpatient Medications   Medication Sig Dispense Refill    levothyroxine (SYNTHROID) 150 mcg tablet Take 1 Tablet by mouth Daily (before breakfast). 30 Tablet 6    onabotulinumtoxinA (Botox) 200 unit injection 200 units by IM route once every 12 weeks. Inject IM neck/face, 31 FDA approved sites. Indication: Migraine prevention. DX code: G43.71 1 Each 3    Viibryd 40 mg tab tablet Take 40 mg by mouth daily. buPROPion SR (WELLBUTRIN SR) 150 mg SR tablet Take 2 Tablets by mouth daily. Takes 2 tabs in AM 60 Tablet 0    cholecalciferol (VITAMIN D3) (5000 Units /125 mcg) capsule Take 1 Capsule by mouth daily. 90 Capsule 1    topiramate (TOPAMAX) 100 mg tablet TAKE ONE TABLET BY MOUTH TWICE DAILY 60 Tablet 3    naloxone (NARCAN) 4 mg/actuation nasal spray Use 1 spray intranasally, then discard. Repeat with new spray every 2 min as needed for opioid overdose symptoms, alternating nostrils. 2 Each 0    traZODone (DESYREL) 300 mg tablet Take 300 mg by mouth nightly. diazePAM (VALIUM) 10 mg tablet Take 10 mg by mouth every six (6) hours as needed for Anxiety.        Allergies   Allergen Reactions    Motrin [Ibuprofen] Unknown (comments)     ulcers       Objective:  Visit Vitals  /74   Pulse 81   Temp 97.1 °F (36.2 °C) (Temporal)   Resp 20   Ht 5' 8\" (1.727 m)   Wt 202 lb (91.6 kg)   SpO2 98%   BMI 30.71 kg/m²     Physical Exam:   General appearance - alert, well appearing in no distress  Mental status - alert, oriented to person, place, and time  Neck - supple, no significant adenopathy   Chest - clear to auscultation, no wheezes, rales or rhonchi  Heart - normal rate, regular rhythm, no murmurs  Abdomen - soft, nontender, nondistended, no organomegaly  Ext-peripheral pulses normal, no pedal edema  Neuro -no focal findings     Results for orders placed or performed in visit on 06/06/22   VITAMIN D, 25 HYDROXY   Result Value Ref Range    VITAMIN D, 25-HYDROXY 36.3 30.0 - 100.0 ng/mL   URINALYSIS W/ RFLX MICROSCOPIC   Result Value Ref Range    Specific Gravity 1.022 1.005 - 1.030    pH (UA) 5.5 5.0 - 7.5    Color Yellow Yellow    Appearance Clear Clear    Leukocyte Esterase 2+ (A) Negative    Protein Negative Negative/Trace    Glucose Negative Negative    Ketone Trace (A) Negative    Blood Negative Negative    Bilirubin Negative Negative    Urobilinogen 0.2 0.2 - 1.0 mg/dL    Nitrites Negative Negative    Microscopic Examination See additional order    HEMOGLOBIN A1C WITH EAG   Result Value Ref Range    Hemoglobin A1c 5.5 4.8 - 5.6 %    Estimated average glucose 111 mg/dL   MICROSCOPIC EXAMINATION   Result Value Ref Range    WBC 0-5 0 - 5 /hpf    RBC None seen 0 - 2 /hpf    Epithelial cells 0-10 0 - 10 /hpf    Casts None seen None seen /lpf    Crystals Present (A) N/A    Crystal type Calcium Oxalate N/A    Bacteria Moderate (A) None seen/Few     Assessment/Plan:  Diagnoses and all orders for this visit:    Osteopenia, unspecified location  -     calcium-cholecalciferol (D3) (CALTRATE 600+D) 1 Tablet; 1 Tablet, Oral, DAILY, First dose on Thu 7/21/22 at 0900, Until Discontinued    Stage 3 chronic kidney disease, unspecified whether stage 3a or 3b CKD (HCC)    Vitamin D deficiency      Learning About Low Bone Density  What is low bone density? Low bone density (sometimes called osteopenia) is a decrease in thickness, or density, in bones. That means the bones become thinner and weaker. It is much more common in women than in men. It's important to know that low bone density is not a disease. It can happen normally with aging. Having low bone density means that there is a greater risk that you may get osteoporosis. It also means that you are more likely to break a bone than someone who does not have low bone density. But not everyone with low bone density gets osteoporosis or breaks a bone. Low bone density doesn't cause any symptoms. It's usually found with a type of X-ray called a bone density test. Low bone density means that your bone density result (T-score) is between -1.0 and -2.5. What increases your risk for low bone density?   Things that increase your risk include:  Getting older. Having a family history of osteoporosis. Being thin. Being white or . Getting too little physical activity. Smoking. Drinking too much alcohol often. Using certain medicines such as steroids. How can you prevent osteoporosis? There are things you can do to help prevent osteoporosis. Certain lifestyle changes will help slow the loss of bone density. Eat food that has plenty of calcium and vitamin D. Yogurt, cheese, milk, and dark green vegetables are high in calcium. Eggs, fatty fish, cereal, and fortified milk are high in vitamin D. Ask your doctor if you need to take a calcium plus vitamin D supplement. You may be able to get enough calcium and vitamin D through your diet. Get regular exercise. Do 30 minutes of weight-bearing exercise on most days of the week. Walking, jogging, stair climbing, and dancing are good choices. Do resistance exercises with weights or elastic bands 2 or 3 days a week. Limit alcohol to 2 drinks a day for men and 1 drink a day for women. Too much alcohol can cause health problems. Do not smoke. Smoking can make bones thin faster. If you need help quitting, talk to your doctor about stop-smoking programs and medicines. These can increase your chances of quitting for good. Prescription medicines are available for treating low bone density. But these are more often used to treat osteoporosis. Follow-up care is a key part of your treatment and safety. Be sure to make and go to all appointments, and call your doctor if you are having problems. It's also a good idea to know your test results and keep a list of the medicines you take. Where can you learn more? Go to http://www.gray.com/  Enter M898 in the search box to learn more about \"Learning About Low Bone Density. \"  Current as of: September 8, 2021               Content Version: 13.2  © 1456-8455 [employer].   Care instructions adapted under license by [address] (which disclaims liability or warranty for this information). If you have questions about a medical condition or this instruction, always ask your healthcare professional. [place] [address] any warranty or liability for your use of this information. Follow-up and Dispositions    Return 3-4 months.

## 2022-08-01 ENCOUNTER — TELEPHONE (OUTPATIENT)
Dept: ENDOCRINOLOGY | Age: 67
End: 2022-08-01

## 2022-08-01 ENCOUNTER — TELEPHONE (OUTPATIENT)
Dept: NEUROLOGY | Age: 67
End: 2022-08-01

## 2022-08-01 NOTE — TELEPHONE ENCOUNTER
8/1/2022  3:56 PM      Pt called and stated she is looking for a kidney specialist pt would like for  to recommend someone hopefully in the building where Marcela Trejo works but if not its ok. BD#[phone]      Thanks,  Merlin Columbus

## 2022-08-05 NOTE — TELEPHONE ENCOUNTER
Lvm for patient that we do have her botox and our impression was that she was going to have [facility] do her botox,  asked which she would like to do as the botox is here for her. Advised that she needs to make sure that the botox is refrigerated, if she decides that she does not want Yany to do the botox for her.

## 2022-08-09 ENCOUNTER — TELEPHONE (OUTPATIENT)
Dept: FAMILY MEDICINE CLINIC | Age: 67
End: 2022-08-09

## 2022-08-09 DIAGNOSIS — N18.30 STAGE 3 CHRONIC KIDNEY DISEASE, UNSPECIFIED WHETHER STAGE 3A OR 3B CKD (HCC): Primary | ICD-10-CM

## 2022-08-09 NOTE — TELEPHONE ENCOUNTER
----- Message from Nasreen Suh sent at 7/27/2022  4:36 PM EDT -----  Subject: Referral Request    Reason for referral request? PT NEEDS A REFERRAL FOR A KIDNEY DR , ANY   QUESTION PLEASE CALL PATIENT ,  Provider patient wants to be referred to(if known):     Provider Phone Number(if known):     Additional Information for Provider?   ---------------------------------------------------------------------------  --------------  [id number]    [phone]; OK to leave message on voicemail  ---------------------------------------------------------------------------  --------------

## 2022-08-10 NOTE — TELEPHONE ENCOUNTER
Patient is calling, because her rx for:calcium-cholecalciferol (D3) (CALTRATE 600+D) 1 Tablet  was never called in to the pharmacy. Please call @[phone].

## 2022-08-12 RX ORDER — DIPHENHYDRAMINE HCL 25 MG
600 TABLET,DISINTEGRATING ORAL DAILY
Qty: 30 TABLET | Refills: 5 | Status: SHIPPED | OUTPATIENT
Start: 2022-08-12 | End: 2022-09-20 | Stop reason: SDUPTHER

## 2022-08-15 ENCOUNTER — TELEPHONE (OUTPATIENT)
Dept: NEUROLOGY | Age: 67
End: 2022-08-15

## 2022-08-24 ENCOUNTER — OFFICE VISIT (OUTPATIENT)
Dept: NEUROLOGY | Age: 67
End: 2022-08-24
Payer: MEDICAID

## 2022-08-24 VITALS — DIASTOLIC BLOOD PRESSURE: 74 MMHG | SYSTOLIC BLOOD PRESSURE: 123 MMHG

## 2022-08-24 DIAGNOSIS — G43.719 INTRACTABLE CHRONIC MIGRAINE WITHOUT AURA AND WITHOUT STATUS MIGRAINOSUS: Primary | ICD-10-CM

## 2022-08-24 DIAGNOSIS — Z92.29 S/P BOTOX INJECTION: ICD-10-CM

## 2022-08-24 PROCEDURE — 64615 CHEMODENERV MUSC MIGRAINE: CPT | Performed by: NURSE PRACTITIONER

## 2022-08-24 NOTE — PROCEDURES
Botox Injection Note       Indication: patient has chronic recurrent migraine. Procedure:   Botox concentration: 200 units in 4 ml of preservative-free normal saline. [address]: [id number]  Lot number: [id number]   Expiration date: 01/2025      31 sites injections, distribution as follow      Units/site  Sites Sides Subtotal    Procerus 5 1 1 5    5 1 2 10   Frontalis 5 2 2 20   Temporalis 5 4 2 40   Occipitalis 5 3 2 30   Upper cervical paraspinalis 5 2 2 20   Trapezius 5 3 2 30         200 units Botox were reconstituted, 155 units injected as above and the remainder was unavoidably wasted.      Patient tolerated procedure well.       _____________________________   Gwyn Daniel NP

## 2022-08-24 NOTE — PROGRESS NOTES
3 [facility]  [facility]3 [facility] [address] [place] [address]  T[place]os Ball  Tel: [phone]  Fax: [phone]      Date:  22     Name:  Keke Herrera  :  1955  MRN:  [id number]     PCP:  Lauren Walker MD    Chief Complaint   Patient presents with    Procedure     Botox       HISTORY OF PRESENT ILLNESS:  Patient presents today for PROCEDURE for Botox for chronic migraines. The last time she received Botox was in February, she was due in August but somehow there was miscommunication, patient was frustrated as now she notes she is behind schedule and she complains of increased headaches, they are happening about 2-3 x week, can last several hours. Will take Tylenol ES and laying down in the dark helps. Cool room is helpful as well. No aura. C/o photophobia, no phonophobia. With regular Botox she was getting migraines as few as 1 x month if that and they were less intense and severe. Patient is eager to get back on schedule that way for headaches and migraines are more tolerable. Preventative medications previously tried              Botox   Topamax              Trazodone              Propranolol              Patient is under the care of mental health and is presently on Viibryd which prevents her from using any other form of antidepressant medication     Rescue medications tried              Fiorinal              Maxalt              Imitrex              Amerge     Baseline headache and migraine days: 20/month  Current headache and migraine days since starting Botox: 1-2 days per month    REVIEW OF SYSTEMS:     Review of Systems   Eyes:  Positive for photophobia. Gastrointestinal:  Negative for nausea and vomiting. Musculoskeletal:  Positive for falls. Neurological:  Positive for headaches. All other systems reviewed and are negative.       Current Outpatient Medications   Medication Sig    calcium carbonate-vitamin D3 (Caltrate with Vitamin D3) 600 mg-20 mcg (800 unit) tab Take 600 mg by mouth daily. levothyroxine (SYNTHROID) 150 mcg tablet Take 1 Tablet by mouth Daily (before breakfast). onabotulinumtoxinA (Botox) 200 unit injection 200 units by IM route once every 12 weeks. Inject IM neck/face, 31 FDA approved sites. Indication: Migraine prevention. DX code: G43.71    Viibryd 40 mg tab tablet Take 40 mg by mouth daily. buPROPion SR (WELLBUTRIN SR) 150 mg SR tablet Take 2 Tablets by mouth daily. Takes 2 tabs in AM    topiramate (TOPAMAX) 100 mg tablet TAKE ONE TABLET BY MOUTH TWICE DAILY    naloxone (NARCAN) 4 mg/actuation nasal spray Use 1 spray intranasally, then discard. Repeat with new spray every 2 min as needed for opioid overdose symptoms, alternating nostrils. traZODone (DESYREL) 300 mg tablet Take 300 mg by mouth nightly. diazePAM (VALIUM) 10 mg tablet Take 10 mg by mouth every six (6) hours as needed for Anxiety.      Current Facility-Administered Medications   Medication Dose Route Frequency    calcium-cholecalciferol (D3) (CALTRATE 600+D) 1 Tablet  1 Tablet Oral DAILY     Allergies   Allergen Reactions    Motrin [Ibuprofen] Unknown (comments)     ulcers     Past Medical History:   Diagnosis Date    Anxiety disorder     Breast cancer ([facility] [address]     right lumpectomy    Concussion     Depression     Fall 04/2021    Headache     Ill-defined condition     migraine HA     Ill-defined condition     concussion March 2021 post fall    Murmur     Psychotic disorder ([facility] [address]     Radiation therapy complication     late 5916 or early 2012 for radiation    Thyroid disease     2005 thyroid no longer working post lithium med     Past Surgical History:   Procedure Laterality Date    HX ANKLE FRACTURE TX      left surgical repair 2008    HX BREAST BIOPSY Left     benign 2011    HX BREAST LUMPECTOMY Right     2011    HX HEENT      tonsillectomy    HX HYSTERECTOMY      HX ORTHOPAEDIC       left meniscus repair x 2    HX OTHER SURGICAL      barthalomew cyst rupture repair HX ROTATOR CUFF REPAIR      right 2015     Social History     Socioeconomic History    Marital status: SINGLE     Spouse name: Not on file    Number of children: Not on file    Years of education: Not on file    Highest education level: Not on file   Occupational History    Not on file   Tobacco Use    Smoking status: Never    Smokeless tobacco: Never   Vaping Use    Vaping Use: Never used   Substance and Sexual Activity    Alcohol use: Yes     Comment: social    Drug use: Not Currently     Types: Cocaine     Comment: \"back in the day\"    Sexual activity: Not Currently   Other Topics Concern    Not on file   Social History Narrative    Not on file     Social Determinants of Health     Financial Resource Strain: Not on file   Food Insecurity: Not on file   Transportation Needs: Not on file   Physical Activity: Not on file   Stress: Not on file   Social Connections: Not on file   Intimate Partner Violence: Not on file   Housing Stability: Not on file     Family History   Adopted: Yes   Problem Relation Age of Onset    Alcohol abuse Mother          PHYSICAL EXAMINATION:    Visit Vitals  /74     General:  Well defined, nourished, and well groomed individual in no acute distress. Neck: Supple, nontender, normal range of motion. Musculoskeletal:  Extremities revealed no edema and had full range of motion of joints. Psych:  Good mood and bright affect    NEUROLOGICAL EXAMINATION:     Mental Status:   Alert and oriented to person, place, and time with recent and remote memory intact. Attention span and concentration are normal. Clear speech. Fund of knowledge preserved. Cranial Nerves: Grossly intact. Gait and Station:  Steady gait    ASSESSMENT AND PLAN      ICD-10-CM ICD-9-CM    1. Intractable chronic migraine without aura and without status migrainosus  G43.719 346.71 CHEMODENERVATION OF MUSCLE(S); MUSCLE(S) INNERVATE      2. S/P Botox injection  Z92.29 V87.49         1.  Intractable chronic migraine without aura and without status migrainosus: See procedure note for Botox injections today, consent obtained, patient tolerated procedure well. We will plan to repeat in 12 weeks. Patient is to continue with healthy lifestyle, continue Tylenol as needed. Topamax as prescribed. -     CHEMODENERVATION OF MUSCLE(S); MUSCLE(S) INNERVATE  2. S/P Botox injection: See procedure note, repeat in 12 weeks. Patient and/or family verbalized understand of all instructions and all questions/concerns were addressed. Safety/side effects of medications discussed. I will see the patient back in 12 weeks, sooner if needed.       Silver Stephenson, FNP-BC

## 2022-09-06 DIAGNOSIS — E55.9 VITAMIN D DEFICIENCY: ICD-10-CM

## 2022-09-07 ENCOUNTER — TRANSCRIBE ORDER (OUTPATIENT)
Dept: SCHEDULING | Age: 67
End: 2022-09-07

## 2022-09-07 DIAGNOSIS — S46.012A STRAIN OF TENDON OF LEFT ROTATOR CUFF: ICD-10-CM

## 2022-09-07 DIAGNOSIS — M77.8 TENDONITIS OF SHOULDER, LEFT: Primary | ICD-10-CM

## 2022-09-07 DIAGNOSIS — S49.92XA: ICD-10-CM

## 2022-09-07 RX ORDER — CHOLECALCIFEROL (VITAMIN D3) 125 MCG
CAPSULE ORAL
Qty: 90 CAPSULE | Refills: 0 | Status: SHIPPED | OUTPATIENT
Start: 2022-09-07

## 2022-09-10 ENCOUNTER — HOSPITAL ENCOUNTER (OUTPATIENT)
Dept: MRI IMAGING | Age: 67
Discharge: HOME | End: 2022-09-10
Attending: ORTHOPAEDIC SURGERY
Payer: MEDICAID

## 2022-09-10 DIAGNOSIS — M77.8 TENDONITIS OF SHOULDER, LEFT: ICD-10-CM

## 2022-09-10 DIAGNOSIS — S49.92XA: ICD-10-CM

## 2022-09-10 DIAGNOSIS — S46.012A STRAIN OF TENDON OF LEFT ROTATOR CUFF: ICD-10-CM

## 2022-09-10 PROCEDURE — 73221 MRI JOINT UPR EXTREM W/O DYE: CPT

## 2022-09-20 ENCOUNTER — OFFICE VISIT (OUTPATIENT)
Dept: FAMILY MEDICINE CLINIC | Age: 67
End: 2022-09-20
Payer: MEDICAID

## 2022-09-20 VITALS
RESPIRATION RATE: 20 BRPM | HEIGHT: 68 IN | BODY MASS INDEX: 30.62 KG/M2 | WEIGHT: 202 LBS | DIASTOLIC BLOOD PRESSURE: 65 MMHG | TEMPERATURE: 97.5 F | SYSTOLIC BLOOD PRESSURE: 133 MMHG | OXYGEN SATURATION: 98 % | HEART RATE: 78 BPM

## 2022-09-20 DIAGNOSIS — N18.30 STAGE 3 CHRONIC KIDNEY DISEASE, UNSPECIFIED WHETHER STAGE 3A OR 3B CKD (HCC): ICD-10-CM

## 2022-09-20 DIAGNOSIS — N30.00 ACUTE CYSTITIS WITHOUT HEMATURIA: ICD-10-CM

## 2022-09-20 DIAGNOSIS — E03.9 ACQUIRED HYPOTHYROIDISM: ICD-10-CM

## 2022-09-20 DIAGNOSIS — E78.2 MIXED HYPERLIPIDEMIA: ICD-10-CM

## 2022-09-20 DIAGNOSIS — Z01.818 PRE-OP EVALUATION: Primary | ICD-10-CM

## 2022-09-20 PROCEDURE — 1123F ACP DISCUSS/DSCN MKR DOCD: CPT | Performed by: INTERNAL MEDICINE

## 2022-09-20 PROCEDURE — 93000 ELECTROCARDIOGRAM COMPLETE: CPT | Performed by: INTERNAL MEDICINE

## 2022-09-20 PROCEDURE — 99214 OFFICE O/P EST MOD 30 MIN: CPT | Performed by: INTERNAL MEDICINE

## 2022-09-20 RX ORDER — DIPHENHYDRAMINE HCL 25 MG
600 TABLET,DISINTEGRATING ORAL DAILY
Qty: 90 TABLET | Refills: 2 | Status: SHIPPED | OUTPATIENT
Start: 2022-09-20

## 2022-09-20 NOTE — PROGRESS NOTES
Chief Complaint   Patient presents with    Pre-op Exam     HPI:  [place] is a 78 y.o.  female with significant medical history below including depression and anxiety, insomnia, vit D def, hypothyroidism presents for pre-op evaluation. Patient has a left shoulder arthroscopic surgery scheduled on 9/23/2022 with Dr. Tamika Benitez at  [address]. Blood pressure is at goal. She has no other complaints. EKG is sinus rhythm.     Review of Systems  As per hpi    Past Medical History:   Diagnosis Date    Anxiety disorder     Breast cancer ([facility] [address]     right lumpectomy    Concussion     Depression     Fall 04/2021    Headache     Ill-defined condition     migraine HA     Ill-defined condition     concussion March 2021 post fall    Murmur     Psychotic disorder [facility])     Radiation therapy complication     late 3807 or early 2012 for radiation    Thyroid disease     2005 thyroid no longer working post lithium med     Past Surgical History:   Procedure Laterality Date    HX ANKLE FRACTURE TX      left surgical repair 2008    HX BREAST BIOPSY Left     benign 2011    HX BREAST LUMPECTOMY Right     2011    HX HEENT      tonsillectomy    HX HYSTERECTOMY      HX ORTHOPAEDIC       left meniscus repair x 2    HX OTHER SURGICAL      barthalomew cyst rupture repair    HX ROTATOR CUFF REPAIR      right 2015     Social History     Socioeconomic History    Marital status: SINGLE   Tobacco Use    Smoking status: Never    Smokeless tobacco: Never   Vaping Use    Vaping Use: Never used   Substance and Sexual Activity    Alcohol use: Yes     Comment: social    Drug use: Not Currently     Types: Cocaine     Comment: \"back in the day\"    Sexual activity: Not Currently     Family History   Adopted: Yes   Problem Relation Age of Onset    Alcohol abuse Mother      Current Outpatient Medications   Medication Sig Dispense Refill    cholecalciferol (VITAMIN D3) (5000 Units /125 mcg) capsule Take 1 capsule by mouth once daily 90 Capsule 0 calcium carbonate-vitamin D3 (Caltrate with Vitamin D3) 600 mg-20 mcg (800 unit) tab Take 600 mg by mouth daily. 30 Tablet 5    levothyroxine (SYNTHROID) 150 mcg tablet Take 1 Tablet by mouth Daily (before breakfast). 30 Tablet 6    onabotulinumtoxinA (Botox) 200 unit injection 200 units by IM route once every 12 weeks. Inject IM neck/face, 31 FDA approved sites. Indication: Migraine prevention. DX code: G43.71 1 Each 3    Viibryd 40 mg tab tablet Take 40 mg by mouth daily. buPROPion SR (WELLBUTRIN SR) 150 mg SR tablet Take 2 Tablets by mouth daily. Takes 2 tabs in AM 60 Tablet 0    topiramate (TOPAMAX) 100 mg tablet TAKE ONE TABLET BY MOUTH TWICE DAILY 60 Tablet 3    naloxone (NARCAN) 4 mg/actuation nasal spray Use 1 spray intranasally, then discard. Repeat with new spray every 2 min as needed for opioid overdose symptoms, alternating nostrils. 2 Each 0    traZODone (DESYREL) 300 mg tablet Take 300 mg by mouth nightly. diazePAM (VALIUM) 10 mg tablet Take 10 mg by mouth every six (6) hours as needed for Anxiety.        Current Facility-Administered Medications   Medication Dose Route Frequency Provider Last Rate Last Admin    calcium-cholecalciferol (D3) (CALTRATE 600+D) 1 Tablet  1 Tablet Oral DAILY Aris Light MD         Allergies   Allergen Reactions    Motrin [Ibuprofen] Unknown (comments)     ulcers       Objective:  Visit Vitals  /65   Pulse 78   Temp 97.5 °F (36.4 °C) (Temporal)   Resp 20   Ht 5' 8\" (1.727 m)   Wt 202 lb (91.6 kg)   SpO2 98%   BMI 30.71 kg/m²     Physical Exam:   General appearance - alert, well appearing in no distress  Mental status - alert, oriented to person, place, and time  ENT-ENT exam normal, no neck nodes or sinus tenderness  Nose - no congestion  Neck - supple, no significant adenopathy   Chest - clear to auscultation, no wheezes, rales or rhonchi  Heart - normal rate, regular rhythm, no murmurs  Abdomen - soft, nontender, nondistended, no organomegaly  Ext-peripheral pulses normal, no pedal edema  Skin-Warm and dry. no hyperpigmentation or suspicious lesions  Neuro -no focal findings  Shoulder-reduced range of motion and tender on left     Results for orders placed or performed in visit on 06/06/22   VITAMIN D, 25 HYDROXY   Result Value Ref Range    VITAMIN D, 25-HYDROXY 36.3 30.0 - 100.0 ng/mL   URINALYSIS W/ RFLX MICROSCOPIC   Result Value Ref Range    Specific Gravity 1.022 1.005 - 1.030    pH (UA) 5.5 5.0 - 7.5    Color Yellow Yellow    Appearance Clear Clear    Leukocyte Esterase 2+ (A) Negative    Protein Negative Negative/Trace    Glucose Negative Negative    Ketone Trace (A) Negative    Blood Negative Negative    Bilirubin Negative Negative    Urobilinogen 0.2 0.2 - 1.0 mg/dL    Nitrites Negative Negative    Microscopic Examination See additional order    HEMOGLOBIN A1C WITH EAG   Result Value Ref Range    Hemoglobin A1c 5.5 4.8 - 5.6 %    Estimated average glucose 111 mg/dL   MICROSCOPIC EXAMINATION   Result Value Ref Range    WBC 0-5 0 - 5 /hpf    RBC None seen 0 - 2 /hpf    Epithelial cells 0-10 0 - 10 /hpf    Casts None seen None seen /lpf    Crystals Present (A) N/A    Crystal type Calcium Oxalate N/A    Bacteria Moderate (A) None seen/Few     Assessment/Plan:  Diagnoses and all orders for this visit:    Pre-op evaluation  -     METABOLIC PANEL, COMPREHENSIVE; Future  -     CBC WITH AUTOMATED DIFF; Future  -     AMB POC EKG ROUTINE W/ 12 LEADS, INTER & REP  -     METABOLIC PANEL, COMPREHENSIVE  -     CBC WITH AUTOMATED DIFF    Stage 3 chronic kidney disease, unspecified whether stage 3a or 3b CKD (HCC)  -     METABOLIC PANEL, COMPREHENSIVE; Future  -     METABOLIC PANEL, COMPREHENSIVE    Acquired hypothyroidism  -     TSH 3RD GENERATION; Future  -     TSH 3RD GENERATION    Acute cystitis without hematuria  -     URINALYSIS W/ RFLX MICROSCOPIC;  Future  -     URINALYSIS W/ RFLX MICROSCOPIC    Mixed hyperlipidemia  -     LIPID PANEL; Future  -     LIPID PANEL    Other orders  -     calcium carbonate-vitamin D3 (Caltrate with Vitamin D3) 600 mg-20 mcg (800 unit) tab; Take 600 mg by mouth daily. , Normal, Disp-90 Tablet, R-2    Patient Instructions   Will complete and fax form as soon as your results are reviewed    Follow-up and Dispositions    Return in about 3 months (around 12/20/2022) for routine follow up.

## 2022-09-20 NOTE — PROGRESS NOTES
Chief Complaint   Patient presents with    Pre-op Exam     1. \"Have you been to the ER, urgent care clinic since your last visit? Hospitalized since your last visit? \" No    2. \"Have you seen or consulted any other health care providers outside of the [address] [place] [address] since your last visit? \" No     3. For patients aged 39-70: Has the patient had a colonoscopy / FIT/ Cologuard? Yes - no Care Gap present      If the patient is female:    4. For patients aged 41-77: Has the patient had a mammogram within the past 2 years? Yes - no Care Gap present      5. For patients aged 21-65: Has the patient had a pap smear?  No

## 2022-09-21 DIAGNOSIS — G43.711 INTRACTABLE CHRONIC MIGRAINE WITHOUT AURA AND WITH STATUS MIGRAINOSUS: ICD-10-CM

## 2022-09-21 LAB
ALBUMIN SERPL-MCNC: 4.6 G/DL (ref 3.8–4.8)
ALBUMIN/GLOB SERPL: 2.3 {RATIO} (ref 1.2–2.2)
ALP SERPL-CCNC: 90 IU/L (ref 44–121)
ALT SERPL-CCNC: 15 IU/L (ref 0–32)
AST SERPL-CCNC: 15 IU/L (ref 0–40)
BASOPHILS # BLD AUTO: 0 X10E3/UL (ref 0–0.2)
BASOPHILS NFR BLD AUTO: 1 %
BILIRUB SERPL-MCNC: 0.3 MG/DL (ref 0–1.2)
BUN SERPL-MCNC: 16 MG/DL (ref 8–27)
BUN/CREAT SERPL: 16 (ref 12–28)
CALCIUM SERPL-MCNC: 9.7 MG/DL (ref 8.7–10.3)
CHLORIDE SERPL-SCNC: 102 MMOL/L (ref 96–106)
CHOLEST SERPL-MCNC: 193 MG/DL (ref 100–199)
CO2 SERPL-SCNC: 22 MMOL/L (ref 20–29)
CREAT SERPL-MCNC: 0.99 MG/DL (ref 0.57–1)
EGFR: 63 ML/MIN/1.73
EOSINOPHIL # BLD AUTO: 0.1 X10E3/UL (ref 0–0.4)
EOSINOPHIL NFR BLD AUTO: 1 %
ERYTHROCYTE [DISTWIDTH] IN BLOOD BY AUTOMATED COUNT: 11.9 % (ref 11.7–15.4)
GLOBULIN SER CALC-MCNC: 2 G/DL (ref 1.5–4.5)
GLUCOSE SERPL-MCNC: 104 MG/DL (ref 65–99)
HCT VFR BLD AUTO: 41.5 % (ref 34–46.6)
HDLC SERPL-MCNC: 52 MG/DL
HGB BLD-MCNC: 13.6 G/DL (ref 11.1–15.9)
IMM GRANULOCYTES # BLD AUTO: 0 X10E3/UL (ref 0–0.1)
IMM GRANULOCYTES NFR BLD AUTO: 0 %
LDLC SERPL CALC-MCNC: 110 MG/DL (ref 0–99)
LYMPHOCYTES # BLD AUTO: 2.2 X10E3/UL (ref 0.7–3.1)
LYMPHOCYTES NFR BLD AUTO: 37 %
MCH RBC QN AUTO: 30.6 PG (ref 26.6–33)
MCHC RBC AUTO-ENTMCNC: 32.8 G/DL (ref 31.5–35.7)
MCV RBC AUTO: 94 FL (ref 79–97)
MONOCYTES # BLD AUTO: 0.6 X10E3/UL (ref 0.1–0.9)
MONOCYTES NFR BLD AUTO: 10 %
NEUTROPHILS # BLD AUTO: 3 X10E3/UL (ref 1.4–7)
NEUTROPHILS NFR BLD AUTO: 51 %
PLATELET # BLD AUTO: 229 X10E3/UL (ref 150–450)
POTASSIUM SERPL-SCNC: 4.5 MMOL/L (ref 3.5–5.2)
PROT SERPL-MCNC: 6.6 G/DL (ref 6–8.5)
RBC # BLD AUTO: 4.44 X10E6/UL (ref 3.77–5.28)
SODIUM SERPL-SCNC: 143 MMOL/L (ref 134–144)
TRIGL SERPL-MCNC: 177 MG/DL (ref 0–149)
TSH SERPL DL<=0.005 MIU/L-ACNC: 0.8 UIU/ML (ref 0.45–4.5)
VLDLC SERPL CALC-MCNC: 31 MG/DL (ref 5–40)
WBC # BLD AUTO: 6 X10E3/UL (ref 3.4–10.8)

## 2022-09-21 NOTE — TELEPHONE ENCOUNTER
Of note:  Rx should be re-written by Lorie Tolilver since she is the servicing provider - please have Rx printed and fax with cover sheet to [facility] and ro to please expedite     Last visit  2/28/22  TRACI Muse    Next visit 11/6/2022 Botox TRACI Laboy     Of note:  Rx should be re-written by Lorie Tolliver since she is the servicing provider - please have Rx printed and fax with cover sheet to [facility] and ro to please expedite

## 2022-09-22 RX ORDER — ONABOTULINUMTOXINA 200 [USP'U]/1
INJECTION, POWDER, LYOPHILIZED, FOR SOLUTION INTRADERMAL; INTRAMUSCULAR
Qty: 1 EACH | Refills: 3 | Status: SHIPPED | OUTPATIENT
Start: 2022-09-22

## 2022-10-03 NOTE — TELEPHONE ENCOUNTER
Spoke with patient, she wanted to get a sooner appointment because she wanted to start getting Botox injections. Offered the patient an appointment for 07/23/19 at 1:20pm    Patient stated she will be here for that appointment and also an appointment reminder has been mailed to the patient. Bill For Surgical Tray: no Billing Type: Third-Party Bill Expected Date Of Service: 10/03/2022 Performing Laboratory: [id number]

## 2022-10-06 ENCOUNTER — OFFICE VISIT (OUTPATIENT)
Dept: URGENT CARE | Age: 67
End: 2022-10-06
Payer: MEDICAID

## 2022-10-06 VITALS
BODY MASS INDEX: 30.71 KG/M2 | WEIGHT: 202 LBS | TEMPERATURE: 97.1 F | SYSTOLIC BLOOD PRESSURE: 143 MMHG | RESPIRATION RATE: 16 BRPM | OXYGEN SATURATION: 97 % | DIASTOLIC BLOOD PRESSURE: 59 MMHG | HEART RATE: 90 BPM

## 2022-10-06 DIAGNOSIS — R59.9 SWOLLEN LYMPH NODES: Primary | ICD-10-CM

## 2022-10-06 LAB
S PYO AG THROAT QL: NEGATIVE
VALID INTERNAL CONTROL?: YES

## 2022-10-06 PROCEDURE — 1123F ACP DISCUSS/DSCN MKR DOCD: CPT | Performed by: NURSE PRACTITIONER

## 2022-10-06 PROCEDURE — 87880 STREP A ASSAY W/OPTIC: CPT | Performed by: NURSE PRACTITIONER

## 2022-10-06 PROCEDURE — 99202 OFFICE O/P NEW SF 15 MIN: CPT | Performed by: NURSE PRACTITIONER

## 2022-10-06 NOTE — PATIENT INSTRUCTIONS
Results for orders placed or performed in visit on 10/06/22   AMB POC RAPID STREP A   Result Value Ref Range    VALID INTERNAL CONTROL POC Yes     Group A Strep Ag Negative Negative       Will send throat culture and call for any results.   Follow up with PCP if not improved over the next 1 week

## 2022-10-06 NOTE — PROGRESS NOTES
Subjective: (As above and below)     The patient/guardian gave verbal consent to treat. Chief Complaint   Patient presents with    Other     Pt had surgery 2 weeks ago on shoulder and has been waking up the last three mornings with swollen lymph nodes on right side of neck       [place] is a 78 y.o. female who presents for evaluation of : sore lymph nodes on his neck onset 3 days ago. Felt them when she woke up. They are mildly sore to touch. Denies fever, sore throat, aches, fever or other URI symptoms. She is s/p left shoulder surgery approx 2 weeks ago without complication. Review of Systems - negative except as listed above    Reviewed PmHx, RxHx, FmHx, SocHx, AllgHx and updated in chart. Family History   Adopted: Yes   Problem Relation Age of Onset    Alcohol abuse Mother        Past Medical History:   Diagnosis Date    Anxiety disorder     Breast cancer ([facility] [address]     right lumpectomy    Concussion     Depression     Fall 04/2021    Headache     Ill-defined condition     migraine HA     Ill-defined condition     concussion March 2021 post fall    Murmur     Psychotic disorder ([facility] [address]     Radiation therapy complication     late 2198 or early 2012 for radiation    Thyroid disease     2005 thyroid no longer working post lithium med      Social History     Socioeconomic History    Marital status: SINGLE   Tobacco Use    Smoking status: Never    Smokeless tobacco: Never   Vaping Use    Vaping Use: Never used   Substance and Sexual Activity    Alcohol use: Yes     Comment: social    Drug use: Not Currently     Types: Cocaine     Comment: \"back in the day\"    Sexual activity: Not Currently          Current Outpatient Medications   Medication Sig    onabotulinumtoxinA (Botox) 200 unit injection 155 units by IM route once every 12 weeks. Inject IM neck/face, 31 FDA approved sites. Indication: Migraine prevention.  DX code: G43.71    calcium carbonate-vitamin D3 (Caltrate with Vitamin D3) 600 mg-20 mcg (800 unit) tab Take 600 mg by mouth daily. cholecalciferol (VITAMIN D3) (5000 Units /125 mcg) capsule Take 1 capsule by mouth once daily    levothyroxine (SYNTHROID) 150 mcg tablet Take 1 Tablet by mouth Daily (before breakfast). Viibryd 40 mg tab tablet Take 40 mg by mouth daily. buPROPion SR (WELLBUTRIN SR) 150 mg SR tablet Take 2 Tablets by mouth daily. Takes 2 tabs in AM    topiramate (TOPAMAX) 100 mg tablet TAKE ONE TABLET BY MOUTH TWICE DAILY    naloxone (NARCAN) 4 mg/actuation nasal spray Use 1 spray intranasally, then discard. Repeat with new spray every 2 min as needed for opioid overdose symptoms, alternating nostrils. traZODone (DESYREL) 300 mg tablet Take 300 mg by mouth nightly. No current facility-administered medications for this visit. Objective:     Vitals:    10/06/22 1511 10/06/22 1530   BP:  (!) 143/59   Pulse: 90    Resp: 16    Temp: 97.1 °F (36.2 °C)    SpO2: 97%    Weight: 202 lb (91.6 kg)        Physical Exam  General appearance - appears well hydrated and does not appear toxic, no acute distress  Eyes - EOMs intact. Non injected. No scleral icterus   Ears - no external swelling. TMs normal bilat. Nose -  No purulent drainage  Mouth - OP clear without swelling, exudate or lesion. Mucus membranes moist. Uvula midline. Neck/Lymphatics - trachea midline, full AROM. Few mildly enlarged and palpable anterior cervical lymph nodes bilat. All are < 2cm and mobile. No supraclavicular LAD. No posterior cervical LAD. Chest - Normal breathing effort no wheeze rales, rhonchi or diminishments bilaterally. Heart - RRR, no murmurs  Skin - no observable rashes or pallor  Neurologic- alert and oriented x 3  Psychiatric- normal mood, behavior and though content. Assessment/ Plan:     1.  Swollen lymph nodes    - AMB POC RAPID STREP A  - UPPER RESPIRATORY CULTURE; Future  - UPPER RESPIRATORY CULTURE      Rapid strep negative  Will send culture  Undetermined etiology  Will call for any abnormal culture results and treat appropriately  If culture is negative and still has swollen nodes, should follow up with PCP within 1 week for further work up.       Test Results:  Recent Results (from the past 6 hour(s))   AMB POC RAPID STREP A    Collection Time: 10/06/22  3:35 PM   Result Value Ref Range    VALID INTERNAL CONTROL POC Yes     Group A Strep Ag Negative Negative         Noel Jessicar, NP

## 2022-10-10 LAB — BACTERIA SPEC RESP CULT: NORMAL

## 2022-11-09 ENCOUNTER — TELEPHONE (OUTPATIENT)
Dept: FAMILY MEDICINE CLINIC | Age: 67
End: 2022-11-09

## 2022-11-09 NOTE — TELEPHONE ENCOUNTER
----- Message from Maylin Gagnon sent at 11/9/2022 11:12 AM EST -----  Subject: Referral Request    Reason for referral request? Requesting referral for stage 3 kidney for   specialist   Provider patient wants to be referred to(if known):     Provider Phone Number(if known):     Additional Information for Provider?   ---------------------------------------------------------------------------  --------------  [address]    [phone]; OK to leave message on voicemail  ---------------------------------------------------------------------------  --------------

## 2022-11-11 ENCOUNTER — TELEPHONE (OUTPATIENT)
Dept: NEUROLOGY | Age: 67
End: 2022-11-11

## 2022-11-11 NOTE — TELEPHONE ENCOUNTER
Botox order has not been called in. I called [facility] and they can ship once they get pt's verbal consent. She has a zero co-pay. I placed Kobe duval on hold and tried to reach her myself. I told her she needs to take the call from [phone] [address] to give her consent. I also left urgent message with Kobe's phone number ton her vm. When Kobe duval tried to call her, the call disconnected twice.      Their hours of operation are Mon - Fri  8 am to 10 pm, Saturday 9 am to 3 pm and Sunday 8 am to 1 pm

## 2022-11-14 ENCOUNTER — TELEPHONE (OUTPATIENT)
Dept: NEUROLOGY | Age: 67
End: 2022-11-14

## 2022-11-14 NOTE — TELEPHONE ENCOUNTER
Botox - not sure if patient called [facility] to give her consent. Noted today:  I called patient this morning, left a vm asking if she had gotten in touch w/ her specialty pharmacy as they will need to ship tonight. Otherwise her appt will need to be cancelled and rescheduled. Friday, 11/11/22: The specialty pharmacy rep and myself made multiple attempts Friday and I did s/w her briefly and told her she needed to answer the call from [phone] [address] and I was going to hang up and then he would try her immediately back while he placed me on hold. He tried twice and she answered and then the phone disconnected. She told me briefly she was 'having trouble with that phone'. I gave her the phone number on her vm of [facility] on Friday to call so she could give her verbal consent, and her co-pay is zero.

## 2022-11-16 ENCOUNTER — OFFICE VISIT (OUTPATIENT)
Dept: NEUROLOGY | Age: 67
End: 2022-11-16
Payer: MEDICAID

## 2022-11-16 DIAGNOSIS — G43.719 INTRACTABLE CHRONIC MIGRAINE WITHOUT AURA AND WITHOUT STATUS MIGRAINOSUS: Primary | ICD-10-CM

## 2022-11-16 DIAGNOSIS — Z92.29 S/P BOTOX INJECTION: ICD-10-CM

## 2022-11-16 PROCEDURE — 64615 CHEMODENERV MUSC MIGRAINE: CPT | Performed by: NURSE PRACTITIONER

## 2022-11-16 NOTE — PROCEDURES
Botox Injection Note       Indication: patient has chronic recurrent migraine. Procedure:   Botox concentration: 200 units in 4 ml of preservative-free normal saline. [address]: [address]-65  Lot number: [id number]  Expiration date: 12/2024      31 sites injections, distribution as follow      Units/site  Sites Sides Subtotal    Procerus 5 1 1 5    5 1 2 10   Frontalis 5 2 2 20   Temporalis 5 4 2 40   Occipitalis 5 3 2 30   Upper cervical paraspinalis 5 2 2 20   Trapezius 5 3 2 30         200 units Botox were reconstituted, 155 units injected as above and the remainder was unavoidably wasted.      Patient tolerated procedure well.       _____________________________   Judy Jennings NP

## 2022-11-16 NOTE — PROGRESS NOTES
[address] [place] [address] Neurology Clinic  [facility]  Tel: [phone]  Fax: [phone]      Date:  22     Name:  Varun Corrales  :  1955  MRN:  [id number]     PCP:  Isabela Varghese MD    Chief Complaint   Patient presents with    Procedure     Botox       HISTORY OF PRESENT ILLNESS:  Patient presents today for Botox injection for chronic migraine, she notes with the Botox she has tremendous relief, she is a little bit behind on her procedure as she had rotator cuff surgery and had to reschedule. She does note she had 1 bad migraine this past weekend she feels though it wore off, she has been taking Tylenol Extra Strength which needs to it. She has nothing else as an abortive. C/o photophobia, no phonophobia. With regular Botox she gets migraines as few as 1 x month if that and they are less intense and severe. Patient is eager to get back on schedule that way for headaches and migraines are more tolerable. Preventative medications previously tried              Botox   Topamax              Trazodone              Propranolol              Patient is under the care of mental health and is presently on Viibryd which prevents her from using any other form of antidepressant medication     Rescue medications tried              Fiorinal              Maxalt              Imitrex              Amerge     Baseline headache and migraine days: 20/month  Current headache and migraine days since starting Botox: 1-2 days per month    REVIEW OF SYSTEMS:     Review of Systems   Musculoskeletal:  Positive for joint pain (left shoulder). Neurological:  Positive for headaches. All other systems reviewed and are negative. Current Outpatient Medications   Medication Sig    rimegepant (NURTEC) 75 mg disintegrating tablet 1 PO at onset of headache/migraine. onabotulinumtoxinA (Botox) 200 unit injection 155 units by IM route once every 12 weeks.  Inject IM neck/face, 31 FDA approved sites. Indication: Migraine prevention. DX code: G43.71    calcium carbonate-vitamin D3 (Caltrate with Vitamin D3) 600 mg-20 mcg (800 unit) tab Take 600 mg by mouth daily. cholecalciferol (VITAMIN D3) (5000 Units /125 mcg) capsule Take 1 capsule by mouth once daily    levothyroxine (SYNTHROID) 150 mcg tablet Take 1 Tablet by mouth Daily (before breakfast). Viibryd 40 mg tab tablet Take 40 mg by mouth daily. buPROPion SR (WELLBUTRIN SR) 150 mg SR tablet Take 2 Tablets by mouth daily. Takes 2 tabs in AM    topiramate (TOPAMAX) 100 mg tablet TAKE ONE TABLET BY MOUTH TWICE DAILY    naloxone (NARCAN) 4 mg/actuation nasal spray Use 1 spray intranasally, then discard. Repeat with new spray every 2 min as needed for opioid overdose symptoms, alternating nostrils. traZODone (DESYREL) 300 mg tablet Take 300 mg by mouth nightly. No current facility-administered medications for this visit.      Allergies   Allergen Reactions    Motrin [Ibuprofen] Unknown (comments)     ulcers     Past Medical History:   Diagnosis Date    Anxiety disorder     Breast cancer ([facility] [address]     right lumpectomy    Concussion     Depression     Fall 04/2021    Headache     Ill-defined condition     migraine HA     Ill-defined condition     concussion March 2021 post fall    Murmur     Psychotic disorder ([facility] [address]     Radiation therapy complication     late 3592 or early 2012 for radiation    Thyroid disease     2005 thyroid no longer working post lithium med     Past Surgical History:   Procedure Laterality Date    HX ANKLE FRACTURE TX      left surgical repair 2008    HX BREAST BIOPSY Left     benign 2011    HX BREAST LUMPECTOMY Right     2011    HX HEENT      tonsillectomy    HX HYSTERECTOMY      HX ORTHOPAEDIC       left meniscus repair x 2    HX OTHER SURGICAL      barthalomew cyst rupture repair    HX ROTATOR CUFF REPAIR      right 2015     Social History     Socioeconomic History    Marital status: SINGLE     Spouse name: Not on file    Number of children: Not on file    Years of education: Not on file    Highest education level: Not on file   Occupational History    Not on file   Tobacco Use    Smoking status: Never    Smokeless tobacco: Never   Vaping Use    Vaping Use: Never used   Substance and Sexual Activity    Alcohol use: Yes     Comment: social    Drug use: Not Currently     Types: Cocaine     Comment: \"back in the day\"    Sexual activity: Not Currently   Other Topics Concern    Not on file   Social History Narrative    Not on file     Social Determinants of Health     Financial Resource Strain: Not on file   Food Insecurity: Not on file   Transportation Needs: Not on file   Physical Activity: Not on file   Stress: Not on file   Social Connections: Not on file   Intimate Partner Violence: Not on file   Housing Stability: Not on file     Family History   Adopted: Yes   Problem Relation Age of Onset    Alcohol abuse Mother          PHYSICAL EXAMINATION:    There were no vitals taken for this visit. General:  Well defined, nourished, and well groomed individual in no acute distress. Musculoskeletal:  Extremities revealed no edema and had decreased range of motion of left shoulder. Psych:  Good mood and bright affect. NEUROLOGICAL EXAMINATION:     Mental Status:   Alert and oriented to person, place, and time with recent and remote memory intact. Attention span and concentration are normal. Clear speech. Fund of knowledge preserved. Cranial Nerves: Grossly intact. Gait and Station:  Steady    ASSESSMENT AND PLAN      ICD-10-CM ICD-9-CM    1. Intractable chronic migraine without aura and without status migrainosus  G43.719 346.71 CHEMODERVATE FACIAL/TRIGEM/CERV MUSC MIGRAINE      rimegepant (NURTEC) 75 mg disintegrating tablet      2. S/P Botox injection  Z92.29 V87.49         1.  Intractable chronic migraine without aura and without status migrainosus: See attached procedure note for Botox injection today, patient tolerated well without side effects. She is return to clinic in 12 weeks for repeat injection. Healthy lifestyle encouraged. I sent in a prescription for her to try the Nurtec if she needs it for a bad migraine. Safety and side effects discussed. -     CHEMODERVATE FACIAL/TRIGEM/CERV MUSC MIGRAINE  -     rimegepant (NURTEC) 75 mg disintegrating tablet; 1 PO at onset of headache/migraine., Normal, Disp-8 Tablet, R-2  2. S/P Botox injection: See procedure note. Patient and/or family verbalized understand of all instructions and all questions/concerns were addressed. Safety/side effects of medications discussed. Patient remains a complex patient secondary to polypharmacy, significant comorbid conditions, and use of multiple medications which complicate the decision making process related to patient's neurologic diagnosis. We will see the patient back in approximately 12 weeks for repeat Botox, sooner if needed.     Ovidio Vila, GIULIANO-BC

## 2022-11-18 DIAGNOSIS — G43.711 INTRACTABLE CHRONIC MIGRAINE WITHOUT AURA AND WITH STATUS MIGRAINOSUS: ICD-10-CM

## 2022-11-18 RX ORDER — ONABOTULINUMTOXINA 200 [USP'U]/1
INJECTION, POWDER, LYOPHILIZED, FOR SOLUTION INTRADERMAL; INTRAMUSCULAR
Qty: 1 EACH | Refills: 3 | Status: SHIPPED | OUTPATIENT
Start: 2022-11-18

## 2022-11-21 ENCOUNTER — OFFICE VISIT (OUTPATIENT)
Dept: FAMILY MEDICINE CLINIC | Age: 67
End: 2022-11-21
Payer: MEDICAID

## 2022-11-21 VITALS
BODY MASS INDEX: 31.16 KG/M2 | OXYGEN SATURATION: 95 % | RESPIRATION RATE: 17 BRPM | HEIGHT: 68 IN | WEIGHT: 205.6 LBS | TEMPERATURE: 96.4 F | HEART RATE: 75 BPM | SYSTOLIC BLOOD PRESSURE: 105 MMHG | DIASTOLIC BLOOD PRESSURE: 67 MMHG

## 2022-11-21 DIAGNOSIS — M54.50 LOW BACK PAIN, NON-SPECIFIC: ICD-10-CM

## 2022-11-21 DIAGNOSIS — R39.89 DARK YELLOW-COLORED URINE: ICD-10-CM

## 2022-11-21 DIAGNOSIS — N23 KIDNEY PAIN: Primary | ICD-10-CM

## 2022-11-21 PROCEDURE — 1123F ACP DISCUSS/DSCN MKR DOCD: CPT | Performed by: INTERNAL MEDICINE

## 2022-11-21 PROCEDURE — 99214 OFFICE O/P EST MOD 30 MIN: CPT | Performed by: INTERNAL MEDICINE

## 2022-11-21 NOTE — PROGRESS NOTES
Chief Complaint   Patient presents with    Referral / Consult     Kidney referral; pt is concerned on how this issue was not noted earlier; Fatigue     Pain on right side of back; feeling lethargic;      HPI:  [place] is a 78 y.o.  female presents with request for referral to nephrologist.  Patient is concerned that renal failure was not noted earlier and now she is experiencing fatigue, back pain, feeling lethargic. When I asked what was going on, she said she saw an orthopedic who told her that she has stage 3 kidney failure. Asked if she had a copy of the lab result she is referring to she said no. I told patient that base lab result from September kidney function was normal.  However, I advised that we repeat he kidney function.     Review of Systems  As per hpi    Past Medical History:   Diagnosis Date    Anxiety disorder     Breast cancer ([facility] [address]     right lumpectomy    Concussion     Depression     Fall 04/2021    Headache     Ill-defined condition     migraine HA     Ill-defined condition     concussion March 2021 post fall    Murmur     Psychotic disorder [facility])     Radiation therapy complication     late 3378 or early 2012 for radiation    Thyroid disease     2005 thyroid no longer working post lithium med     Past Surgical History:   Procedure Laterality Date    HX ANKLE FRACTURE TX      left surgical repair 2008    HX BREAST BIOPSY Left     benign 2011    HX BREAST LUMPECTOMY Right     2011    HX HEENT      tonsillectomy    HX HYSTERECTOMY      HX ORTHOPAEDIC       left meniscus repair x 2    HX OTHER SURGICAL      barthalomew cyst rupture repair    HX ROTATOR CUFF REPAIR      right 2015     Social History     Socioeconomic History    Marital status: SINGLE   Tobacco Use    Smoking status: Never    Smokeless tobacco: Never   Vaping Use    Vaping Use: Never used   Substance and Sexual Activity    Alcohol use: Yes     Comment: social    Drug use: Not Currently     Types: Cocaine Comment: \"back in the day\"    Sexual activity: Not Currently     Family History   Adopted: Yes   Problem Relation Age of Onset    Alcohol abuse Mother      Current Outpatient Medications   Medication Sig Dispense Refill    onabotulinumtoxinA (Botox) 200 unit injection 155 units by IM route once every 12 weeks. Inject IM neck/face, 31 FDA approved sites. Indication: Migraine prevention. DX code: G43.71 1 Each 3    rimegepant (NURTEC) 75 mg disintegrating tablet 1 PO at onset of headache/migraine. 8 Tablet 2    calcium carbonate-vitamin D3 (Caltrate with Vitamin D3) 600 mg-20 mcg (800 unit) tab Take 600 mg by mouth daily. 90 Tablet 2    cholecalciferol (VITAMIN D3) (5000 Units /125 mcg) capsule Take 1 capsule by mouth once daily 90 Capsule 0    levothyroxine (SYNTHROID) 150 mcg tablet Take 1 Tablet by mouth Daily (before breakfast). 30 Tablet 6    Viibryd 40 mg tab tablet Take 40 mg by mouth daily. buPROPion SR (WELLBUTRIN SR) 150 mg SR tablet Take 2 Tablets by mouth daily. Takes 2 tabs in AM 60 Tablet 0    topiramate (TOPAMAX) 100 mg tablet TAKE ONE TABLET BY MOUTH TWICE DAILY 60 Tablet 3    naloxone (NARCAN) 4 mg/actuation nasal spray Use 1 spray intranasally, then discard. Repeat with new spray every 2 min as needed for opioid overdose symptoms, alternating nostrils. 2 Each 0    traZODone (DESYREL) 300 mg tablet Take 300 mg by mouth nightly.        Allergies   Allergen Reactions    Motrin [Ibuprofen] Unknown (comments)     ulcers       Objective:  Visit Vitals  /67 (BP 1 Location: Left upper arm, BP Patient Position: Sitting)   Pulse 75   Temp (!) 96.4 °F (35.8 °C) (Temporal)   Resp 17   Ht 5' 8\" (1.727 m)   Wt 205 lb 9.6 oz (93.3 kg)   SpO2 95%   BMI 31.26 kg/m²     Physical Exam:   General appearance - alert, well appearing in no distress  Mental status - alert, oriented to person, place, and time  Chest - clear to auscultation, no wheezes, rales or rhonchi  Heart - normal rate, regular rhythm, no murmurs  Abdomen - soft, nontender, nondistended, no organomegaly  Ext-peripheral pulses normal, no pedal edema  Skin-Warm and dry. no hyperpigmentation or suspicious lesions    Assessment/Plan:  Diagnoses and all orders for this visit:    Kidney pain  -     REFERRAL TO NEPHROLOGY  -     METABOLIC PANEL, COMPREHENSIVE; Future  -     METABOLIC PANEL, COMPREHENSIVE    Dark yellow-colored urine  -     URINALYSIS W/ RFLX MICROSCOPIC; Future  -     URINALYSIS W/ RFLX MICROSCOPIC    Low back pain, non-specific  -     METABOLIC PANEL, COMPREHENSIVE; Future  -     METABOLIC PANEL, COMPREHENSIVE    Other orders  -     MICROSCOPIC EXAMINATION      Follow-up and Dispositions    Return 1 week, for f/u results.

## 2022-11-21 NOTE — PROGRESS NOTES
Room A4     Identified pt with two pt identifiers(name and ). Reviewed record in preparation for visit and have obtained necessary documentation. All patient medications has been reviewed. Chief Complaint   Patient presents with    Referral / Consult     Kidney referral; pt is concerned on how this issue was not noted earlier; Fatigue     Pain on right side of back; feeling lethargic;        3 most recent PHQ Screens 2022   PHQ Not Done -   Little interest or pleasure in doing things Not at all   Feeling down, depressed, irritable, or hopeless Not at all   Total Score PHQ 2 0   Trouble falling or staying asleep, or sleeping too much -   Feeling tired or having little energy -   Poor appetite, weight loss, or overeating -   Feeling bad about yourself - or that you are a failure or have let yourself or your family down -   Moving or speaking so slowly that other people could have noticed; or the opposite being so fidgety that others notice -   Thoughts of being better off dead, or hurting yourself in some way -   How difficult have these problems made it for you to do your work, take care of your home and get along with others -     Abuse Screening Questionnaire 2021   Do you ever feel afraid of your partner? N   Are you in a relationship with someone who physically or mentally threatens you? N   Is it safe for you to go home? Y       Health Maintenance Due   Topic    DTaP/Tdap/Td series (1 - Tdap)    Shingrix Vaccine Age 49> (1 of 2)    COVID-19 Vaccine (3 - Booster for Where Was it Filmed series)    Flu Vaccine (1)         Health Maintenance Review: Patient reminded of \"due or due soon\" health maintenance. I have asked the patient to contact his/her primary care provider (PCP) for follow-up on his/her health maintenance.     Vitals:    22 1400   BP: 105/67   Pulse: 75   Resp: 17   Temp: (!) 96.4 °F (35.8 °C)   TempSrc: Temporal   SpO2: 95%   Weight: 205 lb 9.6 oz (93.3 kg)   Height: 5' 8\" (1.727 m) PainSc:   5   PainLoc: Shoulder       Wt Readings from Last 3 Encounters:   11/21/22 205 lb 9.6 oz (93.3 kg)   10/06/22 202 lb (91.6 kg)   09/20/22 202 lb (91.6 kg)     Temp Readings from Last 3 Encounters:   11/21/22 (!) 96.4 °F (35.8 °C) (Temporal)   10/06/22 97.1 °F (36.2 °C)   09/20/22 97.5 °F (36.4 °C) (Temporal)     BP Readings from Last 3 Encounters:   11/21/22 105/67   10/06/22 (!) 143/59   09/20/22 133/65     Pulse Readings from Last 3 Encounters:   11/21/22 75   10/06/22 90   09/20/22 78       Coordination of Care Questionnaire:   1) Have you been to an emergency room, urgent care, or hospitalized since your last visit?   no       2. Have seen or consulted any other health care provider since your last visit? NO    Patient is accompanied by self I have received verbal consent from [place] to discuss any/all medical information while they are present in the room.

## 2022-11-22 ENCOUNTER — TELEPHONE (OUTPATIENT)
Dept: ENDOCRINOLOGY | Age: 67
End: 2022-11-22

## 2022-11-22 LAB
ALBUMIN SERPL-MCNC: 4.8 G/DL (ref 3.8–4.8)
ALBUMIN/GLOB SERPL: 2.4 {RATIO} (ref 1.2–2.2)
ALP SERPL-CCNC: 86 IU/L (ref 44–121)
ALT SERPL-CCNC: 23 IU/L (ref 0–32)
APPEARANCE UR: CLEAR
AST SERPL-CCNC: 16 IU/L (ref 0–40)
BACTERIA #/AREA URNS HPF: ABNORMAL /[HPF]
BILIRUB SERPL-MCNC: 0.3 MG/DL (ref 0–1.2)
BILIRUB UR QL STRIP: NEGATIVE
BUN SERPL-MCNC: 18 MG/DL (ref 8–27)
BUN/CREAT SERPL: 19 (ref 12–28)
CALCIUM SERPL-MCNC: 10.1 MG/DL (ref 8.7–10.3)
CASTS URNS QL MICRO: ABNORMAL /LPF
CHLORIDE SERPL-SCNC: 104 MMOL/L (ref 96–106)
CO2 SERPL-SCNC: 22 MMOL/L (ref 20–29)
COLOR UR: YELLOW
CREAT SERPL-MCNC: 0.96 MG/DL (ref 0.57–1)
CRYSTALS URNS MICRO: ABNORMAL
EGFR: 65 ML/MIN/1.73
EPI CELLS #/AREA URNS HPF: ABNORMAL /HPF (ref 0–10)
GLOBULIN SER CALC-MCNC: 2 G/DL (ref 1.5–4.5)
GLUCOSE SERPL-MCNC: 95 MG/DL (ref 70–99)
GLUCOSE UR QL STRIP: NEGATIVE
HGB UR QL STRIP: NEGATIVE
KETONES UR QL STRIP: NEGATIVE
LEUKOCYTE ESTERASE UR QL STRIP: ABNORMAL
MICRO URNS: ABNORMAL
NITRITE UR QL STRIP: NEGATIVE
PH UR STRIP: 6 [PH] (ref 5–7.5)
POTASSIUM SERPL-SCNC: 4.4 MMOL/L (ref 3.5–5.2)
PROT SERPL-MCNC: 6.8 G/DL (ref 6–8.5)
PROT UR QL STRIP: NEGATIVE
RBC #/AREA URNS HPF: ABNORMAL /HPF (ref 0–2)
SODIUM SERPL-SCNC: 139 MMOL/L (ref 134–144)
SP GR UR STRIP: 1.01 (ref 1–1.03)
UNIDENT CRYS URNS QL MICRO: PRESENT
UROBILINOGEN UR STRIP-MCNC: 0.2 MG/DL (ref 0.2–1)
WBC #/AREA URNS HPF: ABNORMAL /HPF (ref 0–5)

## 2022-11-22 NOTE — TELEPHONE ENCOUNTER
FYI: Patient called back. She left a message on VM stating that she apologized for not being able to connect for the phone visit. Canceled appointment. She states that she is doing well on Levothyroxine and does not need any refills.

## 2022-11-22 NOTE — TELEPHONE ENCOUNTER
11/22/2022  8:53 AM      Pt called and stated  called her 22 minutes ago so she was returning his call. Pt stated she spoke with someone yesterday and they asked her did she want to do the appt on the phone. Pt would like to do a phone call not a virtual.Pt#[phone]      Thanks,  Esmer Andrade

## 2022-11-22 NOTE — TELEPHONE ENCOUNTER
Have left 3 message for patient to call me directly to go over the nursing questions. Messages have been left on her VM (name verified).

## 2022-11-30 ENCOUNTER — OFFICE VISIT (OUTPATIENT)
Dept: FAMILY MEDICINE CLINIC | Age: 67
End: 2022-11-30
Payer: MEDICAID

## 2022-11-30 VITALS
TEMPERATURE: 96.5 F | SYSTOLIC BLOOD PRESSURE: 127 MMHG | DIASTOLIC BLOOD PRESSURE: 65 MMHG | BODY MASS INDEX: 30.87 KG/M2 | HEART RATE: 76 BPM | WEIGHT: 203 LBS

## 2022-11-30 DIAGNOSIS — I10 HYPERTENSION, WELL CONTROLLED: Primary | ICD-10-CM

## 2022-11-30 DIAGNOSIS — Z23 ENCOUNTER FOR IMMUNIZATION: ICD-10-CM

## 2022-11-30 NOTE — LETTER
11/30/2022    Ms. [facility]  [address] [facility]      Dear [facility]:    Please find your most recent results below. Stable results  Resulted Orders   URINALYSIS W/ RFLX MICROSCOPIC   Result Value Ref Range    Specific Gravity 1.015 1.005 - 1.030    pH (UA) 6.0 5.0 - 7.5    Color Yellow Yellow    Appearance Clear Clear    Leukocyte Esterase Trace (A) Negative    Protein Negative Negative/Trace    Glucose Negative Negative    Ketone Negative Negative    Blood Negative Negative    Bilirubin Negative Negative    Urobilinogen 0.2 0.2 - 1.0 mg/dL    Nitrites Negative Negative    Microscopic Examination See additional order     Narrative    Performed at:  [address]  [id number]  : Anna Dowling MD, Phone:  [phone]   METABOLIC PANEL, COMPREHENSIVE   Result Value Ref Range    Glucose 95 70 - 99 mg/dL    BUN 18 8 - 27 mg/dL    Creatinine 0.96 0.57 - 1.00 mg/dL    eGFR 65 >59 mL/min/1.73    BUN/Creatinine ratio 19 12 - 28    Sodium 139 134 - 144 mmol/L    Potassium 4.4 3.5 - 5.2 mmol/L    Chloride 104 96 - 106 mmol/L    CO2 22 20 - 29 mmol/L    Calcium 10.1 8.7 - 10.3 mg/dL    Protein, total 6.8 6.0 - 8.5 g/dL    Albumin 4.8 3.8 - 4.8 g/dL    GLOBULIN, TOTAL 2.0 1.5 - 4.5 g/dL    A-G Ratio 2.4 (H) 1.2 - 2.2    Bilirubin, total 0.3 0.0 - 1.2 mg/dL    Alk.  phosphatase 86 44 - 121 IU/L    AST (SGOT) 16 0 - 40 IU/L    ALT (SGPT) 23 0 - 32 IU/L    Narrative    Performed at:  [address] [employer]  [id number]  : Anna Dowling MD, Phone:  [phone]   MICROSCOPIC EXAMINATION   Result Value Ref Range    WBC 0-5 0 - 5 /hpf    RBC 0-2 0 - 2 /hpf    Epithelial cells 0-10 0 - 10 /hpf    Casts None seen None seen /lpf    Crystals Present (A) N/A    Crystal type Calcium Oxalate N/A    Bacteria None seen None seen/Few    Narrative    Performed at:  [address]  [address], [place]  [id number]  : Isidro Jimenez MD, Phone:  [phone]       RECOMMENDATIONS:  Work on diet and exercise. Continue with current  medications.     Please call me if you have any questions: [phone]    Sincerely,      Ashlie Baugh MD

## 2022-11-30 NOTE — PROGRESS NOTES
Chief Complaint   Patient presents with    Results     HPI:  Terry Hirsch is a 78 y.o. female presents for lab review. All results are within normal limits include a perfect renal function test.  Blood pressure reading is at goal.     Review of Systems  As per hpi    Past Medical History:   Diagnosis Date    Anxiety disorder     Breast cancer ([facility] [address]     right lumpectomy    Concussion     Depression     Fall 04/2021    Headache     Ill-defined condition     migraine HA     Ill-defined condition     concussion March 2021 post fall    Murmur     Psychotic disorder ([facility] [address]     Radiation therapy complication     late 5032 or early 2012 for radiation    Thyroid disease     2005 thyroid no longer working post lithium med     Past Surgical History:   Procedure Laterality Date    HX ANKLE FRACTURE TX      left surgical repair 2008    HX BREAST BIOPSY Left     benign 2011    HX BREAST LUMPECTOMY Right     2011    HX HEENT      tonsillectomy    HX HYSTERECTOMY      HX ORTHOPAEDIC       left meniscus repair x 2    HX OTHER SURGICAL      barthalomew cyst rupture repair    HX ROTATOR CUFF REPAIR      right 2015     Social History     Socioeconomic History    Marital status: SINGLE   Tobacco Use    Smoking status: Never    Smokeless tobacco: Never   Vaping Use    Vaping Use: Never used   Substance and Sexual Activity    Alcohol use: Yes     Comment: social    Drug use: Not Currently     Types: Cocaine     Comment: \"back in the day\"    Sexual activity: Not Currently     Family History   Adopted: Yes   Problem Relation Age of Onset    Alcohol abuse Mother      Current Outpatient Medications   Medication Sig Dispense Refill    onabotulinumtoxinA (Botox) 200 unit injection 155 units by IM route once every 12 weeks. Inject IM neck/face, 31 FDA approved sites. Indication: Migraine prevention. DX code: G43.71 1 Each 3    rimegepant (NURTEC) 75 mg disintegrating tablet 1 PO at onset of headache/migraine.  8 Tablet 2    calcium carbonate-vitamin D3 (Caltrate with Vitamin D3) 600 mg-20 mcg (800 unit) tab Take 600 mg by mouth daily. 90 Tablet 2    cholecalciferol (VITAMIN D3) (5000 Units /125 mcg) capsule Take 1 capsule by mouth once daily 90 Capsule 0    levothyroxine (SYNTHROID) 150 mcg tablet Take 1 Tablet by mouth Daily (before breakfast). 30 Tablet 6    Viibryd 40 mg tab tablet Take 40 mg by mouth daily. buPROPion SR (WELLBUTRIN SR) 150 mg SR tablet Take 2 Tablets by mouth daily. Takes 2 tabs in AM 60 Tablet 0    topiramate (TOPAMAX) 100 mg tablet TAKE ONE TABLET BY MOUTH TWICE DAILY 60 Tablet 3    naloxone (NARCAN) 4 mg/actuation nasal spray Use 1 spray intranasally, then discard. Repeat with new spray every 2 min as needed for opioid overdose symptoms, alternating nostrils. 2 Each 0    traZODone (DESYREL) 300 mg tablet Take 300 mg by mouth nightly.        Allergies   Allergen Reactions    Motrin [Ibuprofen] Unknown (comments)     ulcers       Objective:  Visit Vitals  /65   Pulse 76   Temp (!) 96.5 °F (35.8 °C) (Temporal)   Wt 203 lb (92.1 kg)   BMI 30.87 kg/m²     Physical Exam:   General appearance - alert, well appearing in no distress  Mental status - alert, oriented to person, place, and time  Neck - supple, no significant adenopathy   Chest - clear to auscultation, no wheezes, rales or rhonchi  Heart - normal rate, regular rhythm, no murmurs  Ext-peripheral pulses normal, no pedal edema    Results for orders placed or performed in visit on 11/21/22   URINALYSIS W/ RFLX MICROSCOPIC   Result Value Ref Range    Specific Gravity 1.015 1.005 - 1.030    pH (UA) 6.0 5.0 - 7.5    Color Yellow Yellow    Appearance Clear Clear    Leukocyte Esterase Trace (A) Negative    Protein Negative Negative/Trace    Glucose Negative Negative    Ketone Negative Negative    Blood Negative Negative    Bilirubin Negative Negative    Urobilinogen 0.2 0.2 - 1.0 mg/dL    Nitrites Negative Negative    Microscopic Examination See additional order METABOLIC PANEL, COMPREHENSIVE   Result Value Ref Range    Glucose 95 70 - 99 mg/dL    BUN 18 8 - 27 mg/dL    Creatinine 0.96 0.57 - 1.00 mg/dL    eGFR 65 >59 mL/min/1.73    BUN/Creatinine ratio 19 12 - 28    Sodium 139 134 - 144 mmol/L    Potassium 4.4 3.5 - 5.2 mmol/L    Chloride 104 96 - 106 mmol/L    CO2 22 20 - 29 mmol/L    Calcium 10.1 8.7 - 10.3 mg/dL    Protein, total 6.8 6.0 - 8.5 g/dL    Albumin 4.8 3.8 - 4.8 g/dL    GLOBULIN, TOTAL 2.0 1.5 - 4.5 g/dL    A-G Ratio 2.4 (H) 1.2 - 2.2    Bilirubin, total 0.3 0.0 - 1.2 mg/dL    Alk. phosphatase 86 44 - 121 IU/L    AST (SGOT) 16 0 - 40 IU/L    ALT (SGPT) 23 0 - 32 IU/L   MICROSCOPIC EXAMINATION   Result Value Ref Range    WBC 0-5 0 - 5 /hpf    RBC 0-2 0 - 2 /hpf    Epithelial cells 0-10 0 - 10 /hpf    Casts None seen None seen /lpf    Crystals Present (A) N/A    Crystal type Calcium Oxalate N/A    Bacteria None seen None seen/Few     Assessment/Plan:  Diagnoses and all orders for this visit:    Hypertension, well controlled    Encounter for immunization  -     INFLUENZA, FLUAD, (AGE 72 Y+), IM, PF, 0.5 ML      Follow-up and Dispositions    Return for keep appointment.

## 2023-02-07 ENCOUNTER — DOCUMENTATION ONLY (OUTPATIENT)
Dept: NEUROLOGY | Age: 68
End: 2023-02-07

## 2023-02-07 ENCOUNTER — TELEPHONE (OUTPATIENT)
Dept: NEUROLOGY | Age: 68
End: 2023-02-07

## 2023-02-07 NOTE — PROGRESS NOTES
[facility] for [facility] appointment on 2/8/2023 with Narda  :  [employer]er[employer]  Lot number: [id number]  Expiration date: 04/30/2025  Ndc number: 9626-2383-11

## 2023-02-07 NOTE — TELEPHONE ENCOUNTER
VOICEMAIL      Pt states she has a med check appt with her psychiatrist at 2:30 tomorrow. Same time as her appt with us. Wants to know since she's supposed to be getting her shots, can she come in after her med check. Please call.  [phone]

## 2023-02-08 ENCOUNTER — OFFICE VISIT (OUTPATIENT)
Dept: NEUROLOGY | Age: 68
End: 2023-02-08
Payer: MEDICAID

## 2023-02-08 DIAGNOSIS — Z92.29 S/P BOTOX INJECTION: ICD-10-CM

## 2023-02-08 DIAGNOSIS — G43.719 INTRACTABLE CHRONIC MIGRAINE WITHOUT AURA AND WITHOUT STATUS MIGRAINOSUS: Primary | ICD-10-CM

## 2023-02-08 PROCEDURE — 64615 CHEMODENERV MUSC MIGRAINE: CPT | Performed by: NURSE PRACTITIONER

## 2023-02-08 NOTE — PROCEDURES
Botox Injection Note       Indication: patient has chronic recurrent migraine. Procedure:   Botox concentration: 200 units in 4 ml of preservative-free normal saline. NDC: 3178-8577-63  Lot number: [id number]  Expiration date:04/2025      31 sites injections, distribution as follow      Units/site  Sites Sides Subtotal    Procerus 5 1 1 5    5 1 2 10   Frontalis 5 2 2 20   Temporalis 5 4 2 40   Occipitalis 5 3 2 30   Upper cervical paraspinalis 5 2 2 20   Trapezius 5 3 2 30         200 units Botox were reconstituted, 155 units injected as above and the remainder was unavoidably wasted.      Patient tolerated procedure well.       _____________________________   Lloyd Bloch, NP

## 2023-02-08 NOTE — PROGRESS NOTES
[facility]  [address]  Tel: [phone]  Fax: [phone]      Date:  23     Name:  Greta Nascimento  :  1955  MRN:  [id number]     PCP:  Omar Ortega MD    Chief Complaint   Patient presents with    Procedure     Botox       HISTORY OF PRESENT ILLNESS:  Patient presents today for Botox for chronic migraine, last injection was 22: for 2 days afterwards she complained of  increased head aches, but then patient noted significant relief. She has only had 1 migraine since last visit, 4 hours of discomfort so she took a Tylenol and a nap and that eased it up. It was less intense. Prior to Botox injection she was getting at least 20 headache days per month. She has only had 1 true migraine over the last 12 weeks. Nurtec was prescribed at previous visit however she notes it was too expensive therefore she did not  the prescription, however she has not needed it. Preventative medications previously tried              Botox   Topamax              Trazodone              Propranolol              Patient is under the care of mental health and is presently on Viibryd which prevents her from using any other form of antidepressant medication     Rescue medications tried              Fiorinal              Maxalt              Imitrex              Amerge     Baseline headache and migraine days: 20/month  Current headache and migraine days since starting Botox: 1-2 days per 12 weeks    REVIEW OF SYSTEMS:     Review of Systems   Neurological:  Positive for headaches. All other systems reviewed and are negative. Current Outpatient Medications   Medication Sig    onabotulinumtoxinA (Botox) 200 unit injection 155 units by IM route once every 12 weeks. Inject IM neck/face, 31 FDA approved sites. Indication: Migraine prevention.  DX code: G43.71    rimegepant (NURTEC) 75 mg disintegrating tablet 1 PO at onset of headache/migraine. calcium carbonate-vitamin D3 (Caltrate with Vitamin D3) 600 mg-20 mcg (800 unit) tab Take 600 mg by mouth daily. cholecalciferol (VITAMIN D3) (5000 Units /125 mcg) capsule Take 1 capsule by mouth once daily    levothyroxine (SYNTHROID) 150 mcg tablet Take 1 Tablet by mouth Daily (before breakfast). Viibryd 40 mg tab tablet Take 40 mg by mouth daily. buPROPion SR (WELLBUTRIN SR) 150 mg SR tablet Take 2 Tablets by mouth daily. Takes 2 tabs in AM    topiramate (TOPAMAX) 100 mg tablet TAKE ONE TABLET BY MOUTH TWICE DAILY    naloxone (NARCAN) 4 mg/actuation nasal spray Use 1 spray intranasally, then discard. Repeat with new spray every 2 min as needed for opioid overdose symptoms, alternating nostrils. traZODone (DESYREL) 300 mg tablet Take 300 mg by mouth nightly. No current facility-administered medications for this visit.      Allergies   Allergen Reactions    Motrin [Ibuprofen] Unknown (comments)     ulcers     Past Medical History:   Diagnosis Date    Anxiety disorder     Breast cancer ([facility] [address]     right lumpectomy    Concussion     Depression     Fall 04/2021    Headache     Ill-defined condition     migraine HA     Ill-defined condition     concussion March 2021 post fall    Murmur     Psychotic disorder ([facility] [address]     Radiation therapy complication     late 8242 or early 2012 for radiation    Thyroid disease     2005 thyroid no longer working post lithium med     Past Surgical History:   Procedure Laterality Date    HX ANKLE FRACTURE TX      left surgical repair 2008    HX BREAST BIOPSY Left     benign 2011    HX BREAST LUMPECTOMY Right     2011    HX HEENT      tonsillectomy    HX HYSTERECTOMY      HX ORTHOPAEDIC       left meniscus repair x 2    HX OTHER SURGICAL      barthalomew cyst rupture repair    HX ROTATOR CUFF REPAIR      right 2015     Social History     Socioeconomic History    Marital status: SINGLE     Spouse name: Not on file    Number of children: Not on file    Years of education: Not on file    Highest education level: Not on file   Occupational History    Not on file   Tobacco Use    Smoking status: Never    Smokeless tobacco: Never   Vaping Use    Vaping Use: Never used   Substance and Sexual Activity    Alcohol use: Yes     Comment: social    Drug use: Not Currently     Types: Cocaine     Comment: \"back in the day\"    Sexual activity: Not Currently   Other Topics Concern    Not on file   Social History Narrative    Not on file     Social Determinants of Health     Financial Resource Strain: Not on file   Food Insecurity: Not on file   Transportation Needs: Not on file   Physical Activity: Not on file   Stress: Not on file   Social Connections: Not on file   Intimate Partner Violence: Not on file   Housing Stability: Not on file     Family History   Adopted: Yes   Problem Relation Age of Onset    Alcohol abuse Mother          PHYSICAL EXAMINATION:    There were no vitals taken for this visit. General:  Well defined, nourished, and well groomed individual in no acute distress. Psych:  Good mood and bright affect    NEUROLOGICAL EXAMINATION:     Mental Status:   Alert and oriented to person, place, and time with recent and remote memory intact. Attention span and concentration are normal. Clear speech. Fund of knowledge preserved. Cranial Nerves: Grossly intact. Gait and Station:  Steady gait. Normal arm swing. ASSESSMENT AND PLAN      ICD-10-CM ICD-9-CM    1. Intractable chronic migraine without aura and without status migrainosus  G43.719 346.71       2. S/P Botox injection  Z92.29 V87.49         1. Intractable chronic migraine without aura and without status migrainosus: The patient has had significant relief with Botox injections, she is to continue as scheduled. Safety and side effects have been discussed with patient, consent was obtained, see procedure note for Botox procedure today. Healthy lifestyle encouraged.   2. S/P Botox injection: See procedure note      Patient and/or family verbalized understand of all instructions and all questions/concerns were addressed. Safety/side effects of medications discussed. Patient remains a complex patient secondary to polypharmacy, significant comorbid conditions, and use of multiple medications which complicate the decision making process related to patient's neurologic diagnosis. We will have the patient follow-up in 12 weeks, sooner if needed.   Kenzie Roberts, JOSELINEP-BC

## 2023-03-24 ENCOUNTER — OFFICE VISIT (OUTPATIENT)
Dept: ENDOCRINOLOGY | Age: 68
End: 2023-03-24

## 2023-03-24 VITALS
DIASTOLIC BLOOD PRESSURE: 63 MMHG | HEART RATE: 77 BPM | SYSTOLIC BLOOD PRESSURE: 119 MMHG | HEIGHT: 68 IN | BODY MASS INDEX: 31.22 KG/M2 | WEIGHT: 206 LBS

## 2023-03-24 DIAGNOSIS — E55.9 HYPOVITAMINOSIS D: ICD-10-CM

## 2023-03-24 DIAGNOSIS — E78.2 MIXED HYPERLIPIDEMIA: ICD-10-CM

## 2023-03-24 DIAGNOSIS — R63.5 WEIGHT GAIN: ICD-10-CM

## 2023-03-24 DIAGNOSIS — E03.9 ACQUIRED HYPOTHYROIDISM: Primary | ICD-10-CM

## 2023-03-24 RX ORDER — DIAZEPAM 10 MG/1
10 TABLET ORAL 2 TIMES DAILY
COMMUNITY
Start: 2023-03-15

## 2023-03-24 NOTE — LETTER
3/24/2023    Patient: Cindy Stephens   YOB: 1955   Date of Visit: 3/24/2023     Akilah Delcid, [address]  [place]    Dear Akilah Delcid MD,      Thank you for referring Ms. Cindy Stephens to [facility] for evaluation. My notes for this consultation are attached. If you have questions, please do not hesitate to call me. I look forward to following your patient along with you.       Sincerely,    Kojo Roberts MD

## 2023-03-24 NOTE — PROGRESS NOTES
Chief Complaint   Patient presents with    Thyroid Problem     Pcp and pharmacy verified     History of Present Illness: [place] is a 78 y.o. female here for follow up of hypothyroidism. \"I had been taking lithium in the past, for bipolar d/0 and it killed my thyroid. \"  At our initial visit in April 2021 her TSH was 7.5 so I increased her LT4 to 150mcg every day. Pt instructed to take her LT4 by itself, on an empty stomach with a full glass of water and wait 30 minutes before she takes any other medications or eats. At our last visit in May 2022 she was clinically and biochemically euthyroid on the LT4 150mcg daily and pt was instructed to continue this dose. Her lipid panel was again high and I counseled her about low fat dieting. Her eGFR was down to 59 and I gave her the number for [facility]. Her eGFR in November 2022 was up to 72. \"I fell in September, I tripped over a garden hose and tore my left rotator cuff. I am still in PT for that. \"  She was diagnosed with COVID and bronchitis in January 2023. Pt is taking the LT4 150mcg every day. She takes this first thing in the morning, on an empty stomach, with a full glass of water by itself and waits 30-60 minutes before she eats. She is taking vitamin D 5,000 units daily. She is now seeing a new Neurologist \"Jamar Schaefer\". She denies issues of palpitations, or CP, SOB, tremors, heat or cold intolerance, diarrhea or constipation. She denies issues of dysphagia, dysphonia or chocking issues. Since I have not been as physically active I have gained weight and I need labs drawn. Her weight today was 206 pounds. Pt is waking around 11AM-Noon \"but I don't go to bed till 2AM. I have been sleeping about 12-14 hours per day sometimes\". Pt notes that she is eating 2 meals per day.   \"I eat a banana in the morming and then for lunch (around 1-2PM) yesterday I had two hot dogs (no buns), a can of double noodle soup and water. She has dinner around 8PM, last night she had steak, baked potato, salad and water. She denies snacking during the afternoon or at night. Current Outpatient Medications   Medication Sig    onabotulinumtoxinA (Botox) 200 unit injection 155 units by IM route once every 12 weeks. Inject IM neck/face, 31 FDA approved sites. Indication: Migraine prevention. DX code: G43.71    rimegepant (NURTEC) 75 mg disintegrating tablet 1 PO at onset of headache/migraine. calcium carbonate-vitamin D3 (Caltrate with Vitamin D3) 600 mg-20 mcg (800 unit) tab Take 600 mg by mouth daily. cholecalciferol (VITAMIN D3) (5000 Units /125 mcg) capsule Take 1 capsule by mouth once daily    levothyroxine (SYNTHROID) 150 mcg tablet Take 1 Tablet by mouth Daily (before breakfast). Viibryd 40 mg tab tablet Take 40 mg by mouth daily. buPROPion SR (WELLBUTRIN SR) 150 mg SR tablet Take 2 Tablets by mouth daily. Takes 2 tabs in AM    topiramate (TOPAMAX) 100 mg tablet TAKE ONE TABLET BY MOUTH TWICE DAILY    naloxone (NARCAN) 4 mg/actuation nasal spray Use 1 spray intranasally, then discard. Repeat with new spray every 2 min as needed for opioid overdose symptoms, alternating nostrils. traZODone (DESYREL) 300 mg tablet Take 300 mg by mouth nightly. No current facility-administered medications for this visit. Allergies   Allergen Reactions    Motrin [Ibuprofen] Unknown (comments)     ulcers     Review of Systems:  - Cardiovascular: no chest pain  - Neurological: no tremors  - Integumentary: skin is normal    Physical Examination:  There were no vitals taken for this visit.   General: pleasant, no distress, good eye contact   Neck: no thyromegaly or thyroid bruits  Cardiovascular: regular, normal rate, nl s1 and s2, no m/r/g   Integumentary: skin is normal, no edema  Neurological: reflexes 2+ at biceps, no tremors  Psychiatric: normal mood and affect    Data Reviewed:   Component      Latest Ref Rng & Units 11/21/2022           3:10 PM   Glucose      70 - 99 mg/dL 95   BUN      8 - 27 mg/dL 18   Creatinine      0.57 - 1.00 mg/dL 0.96   eGFR      >59 mL/min/1.73 65   BUN/Creatinine ratio      12 - 28 19   Sodium      134 - 144 mmol/L 139   Potassium      3.5 - 5.2 mmol/L 4.4   Chloride      96 - 106 mmol/L 104   CO2      20 - 29 mmol/L 22   Calcium      8.7 - 10.3 mg/dL 10.1   Protein, total      6.0 - 8.5 g/dL 6.8   Albumin      3.8 - 4.8 g/dL 4.8   GLOBULIN, TOTAL      1.5 - 4.5 g/dL 2.0   A-G Ratio      1.2 - 2.2 2.4 (H)   Bilirubin, total      0.0 - 1.2 mg/dL 0.3   Alk. phosphatase      44 - 121 IU/L 86   AST      0 - 40 IU/L 16   ALT      0 - 32 IU/L 23       Assessment/Plan:   1) Hypothyroidism > Pt is clinically euthyroid on LT4 150mcg daily. Will order TFTs and adjust her dose as needed. She will need 30 day refills once her labs are back. 2) Weight Gain > I will order CBC, CMP, lipid panel and A1C today. Pt counseled about low calorie (1200 calorie per day) dieting. She is fasting at this time so I will order an IGF-1, ACTH and cortisol. 3) HLD > She has hx of HLD, she is not taking any anti-lipid medications, she is asking we test her lipid levels. 4) Hypovitaminosis D > She is taking Vitamin D 5000 units per day, will order a vitamin D level. Pt voices understanding and agreement with the plan.     RTC 4 months    Copy sent to:  Dr. Ira John

## 2023-03-27 LAB — ACTH PLAS-MCNC: 14.3 PG/ML (ref 7.2–63.3)

## 2023-03-28 ENCOUNTER — OFFICE VISIT (OUTPATIENT)
Dept: FAMILY MEDICINE CLINIC | Age: 68
End: 2023-03-28
Payer: MEDICAID

## 2023-03-28 ENCOUNTER — TELEPHONE (OUTPATIENT)
Dept: ENDOCRINOLOGY | Age: 68
End: 2023-03-28

## 2023-03-28 VITALS
SYSTOLIC BLOOD PRESSURE: 139 MMHG | TEMPERATURE: 97 F | HEART RATE: 72 BPM | BODY MASS INDEX: 30.65 KG/M2 | HEIGHT: 68 IN | WEIGHT: 202.2 LBS | DIASTOLIC BLOOD PRESSURE: 77 MMHG | RESPIRATION RATE: 20 BRPM | OXYGEN SATURATION: 94 %

## 2023-03-28 DIAGNOSIS — G43.711 INTRACTABLE CHRONIC MIGRAINE WITHOUT AURA AND WITH STATUS MIGRAINOSUS: ICD-10-CM

## 2023-03-28 DIAGNOSIS — F31.75 BIPOLAR I DISORDER, IN PARTIAL REMISSION (HCC): ICD-10-CM

## 2023-03-28 DIAGNOSIS — E55.9 VITAMIN D DEFICIENCY: ICD-10-CM

## 2023-03-28 DIAGNOSIS — I10 HYPERTENSION GOAL BP (BLOOD PRESSURE) < 130/80: Primary | ICD-10-CM

## 2023-03-28 DIAGNOSIS — Z23 ENCOUNTER FOR IMMUNIZATION: ICD-10-CM

## 2023-03-28 DIAGNOSIS — N18.30 STAGE 3 CHRONIC KIDNEY DISEASE, UNSPECIFIED WHETHER STAGE 3A OR 3B CKD (HCC): ICD-10-CM

## 2023-03-28 DIAGNOSIS — F43.10 PTSD (POST-TRAUMATIC STRESS DISORDER): ICD-10-CM

## 2023-03-28 PROCEDURE — 3074F SYST BP LT 130 MM HG: CPT | Performed by: INTERNAL MEDICINE

## 2023-03-28 PROCEDURE — 1123F ACP DISCUSS/DSCN MKR DOCD: CPT | Performed by: INTERNAL MEDICINE

## 2023-03-28 PROCEDURE — 3078F DIAST BP <80 MM HG: CPT | Performed by: INTERNAL MEDICINE

## 2023-03-28 PROCEDURE — 90732 PPSV23 VACC 2 YRS+ SUBQ/IM: CPT | Performed by: INTERNAL MEDICINE

## 2023-03-28 PROCEDURE — 99214 OFFICE O/P EST MOD 30 MIN: CPT | Performed by: INTERNAL MEDICINE

## 2023-03-28 RX ORDER — CHOLECALCIFEROL (VITAMIN D3) 125 MCG
5000 CAPSULE ORAL DAILY
Qty: 90 CAPSULE | Refills: 0 | Status: SHIPPED | OUTPATIENT
Start: 2023-03-28

## 2023-03-28 RX ORDER — BUPROPION HYDROCHLORIDE 150 MG/1
300 TABLET, EXTENDED RELEASE ORAL DAILY
Qty: 60 TABLET | Refills: 0 | Status: CANCELLED | OUTPATIENT
Start: 2023-03-28

## 2023-03-28 RX ORDER — TRAZODONE HYDROCHLORIDE 300 MG/1
300 TABLET ORAL
Status: CANCELLED | OUTPATIENT
Start: 2023-03-28

## 2023-03-28 RX ORDER — NIFEDIPINE 30 MG/1
30 TABLET, FILM COATED, EXTENDED RELEASE ORAL DAILY
Qty: 30 TABLET | Refills: 3 | Status: SHIPPED | OUTPATIENT
Start: 2023-03-28

## 2023-03-28 RX ORDER — TOPIRAMATE 100 MG/1
TABLET, FILM COATED ORAL
Qty: 60 TABLET | Refills: 3 | Status: CANCELLED | OUTPATIENT
Start: 2023-03-28

## 2023-03-28 RX ORDER — DIPHENHYDRAMINE HCL 25 MG
600 TABLET,DISINTEGRATING ORAL DAILY
Qty: 90 TABLET | Refills: 2 | Status: SHIPPED | OUTPATIENT
Start: 2023-03-28

## 2023-03-28 NOTE — PROGRESS NOTES
Chief Complaint   Patient presents with    Follow-up     Routine check-up   1. Have you been to the ER, urgent care clinic since your last visit? Hospitalized since your last visit? Yes Where: [facility]    2. Have you seen or consulted any other health care providers outside of the [address] [place] [address] since your last visit? Include any pap smears or colon screening. NO      Pt was given PPSV23 in the right deltoid upper arm, pt was observed for 15 minutes and showed no signs of a reaction.

## 2023-03-28 NOTE — PROGRESS NOTES
Chief Complaint   Patient presents with    Follow-up     Routine check-up     HPI:  [place] is a 78 y.o. female with depression and anxiety, insomnia, vit D def, hypothyroidism presents for follow up. Patient is doing well. Blood pressure is at goal. She has no complaints. Review of Systems  As per hpi    Past Medical History:   Diagnosis Date    Anxiety disorder     Breast cancer ([facility] [address]     right lumpectomy    Concussion     Depression     Fall 04/2021    Headache     Ill-defined condition     migraine HA     Ill-defined condition     concussion March 2021 post fall    Murmur     Psychotic disorder [facility])     Radiation therapy complication     late 4600 or early 2012 for radiation    Thyroid disease     2005 thyroid no longer working post lithium med     Past Surgical History:   Procedure Laterality Date    HX ANKLE FRACTURE TX      left surgical repair 2008    HX BREAST BIOPSY Left     benign 2011    HX BREAST LUMPECTOMY Right     2011    HX HEENT      tonsillectomy    HX HYSTERECTOMY      HX ORTHOPAEDIC       left meniscus repair x 2    HX OTHER SURGICAL      barthalomew cyst rupture repair    HX ROTATOR CUFF REPAIR      right 2015     Social History     Socioeconomic History    Marital status: SINGLE   Tobacco Use    Smoking status: Never    Smokeless tobacco: Never   Vaping Use    Vaping Use: Never used   Substance and Sexual Activity    Alcohol use:  Yes     Alcohol/week: 5.0 standard drinks     Types: 5 Shots of liquor per week     Comment: social    Drug use: Not Currently     Types: Cocaine     Comment: \"back in the day\"    Sexual activity: Not Currently     Social Determinants of Health     Financial Resource Strain: Low Risk     Difficulty of Paying Living Expenses: Not very hard   Food Insecurity: No Food Insecurity    Worried About Running Out of Food in the Last Year: Never true    Ran Out of Food in the Last Year: Never true     Family History   Adopted: Yes   Problem Relation Age of Onset Alcohol abuse Mother      Current Outpatient Medications   Medication Sig Dispense Refill    diazePAM (VALIUM) 10 mg tablet Take 10 mg by mouth two (2) times a day. onabotulinumtoxinA (Botox) 200 unit injection 155 units by IM route once every 12 weeks. Inject IM neck/face, 31 FDA approved sites. Indication: Migraine prevention. DX code: G43.71 1 Each 3    calcium carbonate-vitamin D3 (Caltrate with Vitamin D3) 600 mg-20 mcg (800 unit) tab Take 600 mg by mouth daily. 90 Tablet 2    cholecalciferol (VITAMIN D3) (5000 Units /125 mcg) capsule Take 1 capsule by mouth once daily 90 Capsule 0    levothyroxine (SYNTHROID) 150 mcg tablet Take 1 Tablet by mouth Daily (before breakfast). 30 Tablet 6    Viibryd 40 mg tab tablet Take 40 mg by mouth daily. buPROPion SR (WELLBUTRIN SR) 150 mg SR tablet Take 2 Tablets by mouth daily. Takes 2 tabs in AM 60 Tablet 0    topiramate (TOPAMAX) 100 mg tablet TAKE ONE TABLET BY MOUTH TWICE DAILY 60 Tablet 3    naloxone (NARCAN) 4 mg/actuation nasal spray Use 1 spray intranasally, then discard. Repeat with new spray every 2 min as needed for opioid overdose symptoms, alternating nostrils. 2 Each 0    traZODone (DESYREL) 300 mg tablet Take 300 mg by mouth nightly. rimegepant (NURTEC) 75 mg disintegrating tablet 1 PO at onset of headache/migraine.  (Patient not taking: No sig reported) 8 Tablet 2     Allergies   Allergen Reactions    Motrin [Ibuprofen] Unknown (comments)     ulcers       Objective:  Visit Vitals  /77 (BP 1 Location: Left arm, BP Patient Position: Sitting, BP Cuff Size: Adult)   Pulse 72   Temp 97 °F (36.1 °C)   Resp 20   Ht 5' 8\" (1.727 m)   Wt 202 lb 3.2 oz (91.7 kg)   SpO2 94%   BMI 30.74 kg/m²     Physical Exam:   General appearance - alert, well appearing in no distress  Mental status - alert, oriented to person, place, and time  Chest - clear to auscultation, no wheezes, rales or rhonchi  Heart - normal S1, S2, no murmurs  Abdomen - soft, nontender, nondistended, no organomegaly  Ext-peripheral pulses normal, no pedal edema  Neuro -no focal findings   Back-full range of motion, no tenderness, palpable spasm or pain on motion     Results for orders placed or performed in visit on 03/24/23   INSULIN-LIKE GROWTH FACTOR 1   Result Value Ref Range    Insulin-Like Growth Factor I 116 52 - 196 ng/mL   CORTISOL   Result Value Ref Range    Cortisol, random 16.8 ug/dL   HEMOGLOBIN A1C WITH EAG   Result Value Ref Range    Hemoglobin A1c 5.2 4.0 - 5.6 %    Est. average glucose 884 mg/dL   METABOLIC PANEL, COMPREHENSIVE   Result Value Ref Range    Sodium 142 136 - 145 mmol/L    Potassium 4.1 3.5 - 5.1 mmol/L    Chloride 113 (H) 97 - 108 mmol/L    CO2 25 21 - 32 mmol/L    Anion gap 4 (L) 5 - 15 mmol/L    Glucose 126 (H) 65 - 100 mg/dL    BUN 27 (H) 6 - 20 MG/DL    Creatinine 0.87 0.55 - 1.02 MG/DL    BUN/Creatinine ratio 31 (H) 12 - 20      eGFR >60 >60 ml/min/1.73m2    Calcium 9.3 8.5 - 10.1 MG/DL    Bilirubin, total 0.3 0.2 - 1.0 MG/DL    ALT (SGPT) 36 12 - 78 U/L    AST (SGOT) 21 15 - 37 U/L    Alk.  phosphatase 90 45 - 117 U/L    Protein, total 6.7 6.4 - 8.2 g/dL    Albumin 4.0 3.5 - 5.0 g/dL    Globulin 2.7 2.0 - 4.0 g/dL    A-G Ratio 1.5 1.1 - 2.2     CBC W/O DIFF   Result Value Ref Range    WBC 3.6 3.6 - 11.0 K/uL    RBC 4.53 3.80 - 5.20 M/uL    HGB 13.7 11.5 - 16.0 g/dL    HCT 43.9 35.0 - 47.0 %    MCV 96.9 80.0 - 99.0 FL    MCH 30.2 26.0 - 34.0 PG    MCHC 31.2 30.0 - 36.5 g/dL    RDW 12.6 11.5 - 14.5 %    PLATELET 759 237 - 677 K/uL    MPV 11.7 8.9 - 12.9 FL    NRBC 0.0 0  WBC    ABSOLUTE NRBC 0.00 0.00 - 0.01 K/uL   VITAMIN D, 25 HYDROXY   Result Value Ref Range    Vitamin D 25-Hydroxy 32.8 30 - 100 ng/mL   LIPID PANEL   Result Value Ref Range    Cholesterol, total 192 <200 MG/DL    Triglyceride 109 <150 MG/DL    HDL Cholesterol 62 MG/DL    LDL, calculated 108.2 (H) 0 - 100 MG/DL    VLDL, calculated 21.8 MG/DL    CHOL/HDL Ratio 3.1 0.0 - 5.0     T4, FREE Result Value Ref Range    T4, Free 1.1 0.8 - 1.5 NG/DL   TSH 3RD GENERATION   Result Value Ref Range    TSH 0.38 0.36 - 3.74 uIU/mL     Assessment/Plan:  Diagnoses and all orders for this visit:    Hypertension goal BP (blood pressure) < 130/80  -     NIFEdipine ER (ADALAT CC) 30 mg ER tablet; Take 1 Tablet by mouth daily. , Normal, Disp-30 Tablet, R-3    PTSD (post-traumatic stress disorder)  -     REFERRAL TO PSYCHIATRY    Vitamin D deficiency  -     cholecalciferol (VITAMIN D3) (5000 Units /125 mcg) capsule; Take 1 Capsule by mouth daily. , Normal, Disp-90 Capsule, R-0    Intractable chronic migraine without aura and with status migrainosus    Bipolar I disorder, in partial remission (HCC)  -     REFERRAL TO PSYCHIATRY    Stage 3 chronic kidney disease, unspecified whether stage 3a or 3b CKD ([facility] [address]    Encounter for immunization  -     PNEUMOCOCCAL, PPSV23, (AGE 2 YRS+), SC/IM    Other orders  -     calcium carbonate-vitamin D3 (Caltrate with Vitamin D3) 600 mg-20 mcg (800 unit) tab; Take 600 mg by mouth daily. , Normal, Disp-90 Tablet, R-2        Follow-up and Dispositions    Return 2 weeks, for f/u blood pressure.

## 2023-03-30 NOTE — TELEPHONE ENCOUNTER
Patient called back stating she can be reached in the afternoons. (She said to keep trying to reach her).

## 2023-04-28 ENCOUNTER — DOCUMENTATION ONLY (OUTPATIENT)
Dept: NEUROLOGY | Age: 68
End: 2023-04-28

## 2023-04-28 NOTE — PROGRESS NOTES
Sent to [employer] form Johnny Barthel for insurance confirmation and PA for Botox scheduled with Dr Cesar Offer 5/23/23

## 2023-04-28 NOTE — PROGRESS NOTES
Botox Damaris John looks valid and I would try to reach the [facility] pharmacy to set up delivery. Plan started May 2022 and this auth started in August 2022. Let me know if you have any trouble getting it ordered.      botox migraine  Botox   Auth/ Case: [id number]  8/15/22 - 8/15/23  CPT no PA required  SPP Kobe

## 2023-05-16 ENCOUNTER — CLINICAL DOCUMENTATION (OUTPATIENT)
Age: 68
End: 2023-05-16

## 2023-05-16 NOTE — PROGRESS NOTES
Spoke with Michael at [address] [place] [address] to schedule delivery of botox. We have it documented that PA is valid through 8/15/23. Oneil Jackson states per insurance - botox requires updated PA. Sent Traci a message through teams. Used patient initials only.

## 2023-05-17 ENCOUNTER — TELEPHONE (OUTPATIENT)
Age: 68
End: 2023-05-17

## 2023-05-22 ENCOUNTER — TELEPHONE (OUTPATIENT)
Age: 68
End: 2023-05-22

## 2023-05-22 NOTE — TELEPHONE ENCOUNTER
Burgess Bah called to state that PA request for botox has been closed as there has been no movement on it for a month. They will re-open the referral when we complete the PA.     Please call [phone]

## 2023-05-22 NOTE — TELEPHONE ENCOUNTER
Pt states she has an appt for botox tomorrow but her spec pharmacy called stating it was not ordered. Please call pt to discuss appt.  [phone]

## 2023-05-22 NOTE — TELEPHONE ENCOUNTER
Verified patient with 2 identifiers   Advised that her PA is still pending. Stated we will use one that has not been used. Advised we will order hers when PA gets approved.

## 2023-05-23 ENCOUNTER — PROCEDURE VISIT (OUTPATIENT)
Age: 68
End: 2023-05-23
Payer: MEDICARE

## 2023-05-23 ENCOUNTER — TELEPHONE (OUTPATIENT)
Age: 68
End: 2023-05-23

## 2023-05-23 DIAGNOSIS — G43.709 CHRONIC MIGRAINE W/O AURA W/O STATUS MIGRAINOSUS, NOT INTRACTABLE: Primary | ICD-10-CM

## 2023-05-23 PROCEDURE — 64615 CHEMODENERV MUSC MIGRAINE: CPT | Performed by: INTERNAL MEDICINE

## 2023-05-23 NOTE — TELEPHONE ENCOUNTER
Called Dalton  - since pt has a Part D plan, we will attempt to use  as her specialty pharmacy once approved. It is now approved through [employer] (her PBM). Medicare part D plan  Auth [id number]  98-[birth year]-02 to 8-23-23     Sent via M:  Key: [id number] - PA Case ID: [id number]    Approval letter - scanned to Media. [facility]     Notes from [id number] Jagjit James phone call today:    Total time on phone: 44 minutes: They can see the shipment they processed to our office in February but cannot identify why or how as \"during the beginning of the year they will ship without verifying auth's due to volume\". I stated that was the first time I had ever heard of that. She stated cannot see the auth information - and they only ship, do not process the PA. I realize that - but the PBM seems to be [employer]  - patient now has [employer] Part D -     Sent via CMM:  Key: [id number] - PA Case ID: [id number]    Approved - to 8-23-23 - scanned to [employer].

## 2023-05-23 NOTE — TELEPHONE ENCOUNTER
We will disregard the previous auth listed below since I obtained a new [employer] Medicare Part D auth. There is a current auth on file and patient has only used 2 from that authorization.      Botox   Auth/ Case: [id number]  8/15/22 - 8/15/23  CPT no PA required  SPP Acaria

## 2023-05-23 NOTE — PROGRESS NOTES
Botox Injection Note       Indication: patient has chronic recurrent migraine. Procedure:   Botox concentration: 200 units in 4 ml of preservative-free normal saline. [address]ie [address]: [address]-29  Lot number: [id number]  Expiration date: 12/2025      31 sites injections, distribution as follow      Units/site  Sites Sides Subtotal    Procerus 5 1 1 5    5 1 2 10   Frontalis 5 2 2 20   Temporalis 5 4 2 40   Occipitalis 5 3 2 30   Upper cervical paraspinalis 5 2 2 20   Trapezius 5 3 2 30         200 units Botox were reconstituted, 155 units injected as above and the remainder was unavoidably wasted.      Patient tolerated procedure well.       _____________________________   Iris Fairchild,   Neurophysiology

## 2023-05-25 ENCOUNTER — OFFICE VISIT (OUTPATIENT)
Age: 68
End: 2023-05-25
Payer: MEDICARE

## 2023-05-25 VITALS
OXYGEN SATURATION: 96 % | HEART RATE: 93 BPM | SYSTOLIC BLOOD PRESSURE: 143 MMHG | BODY MASS INDEX: 30.37 KG/M2 | RESPIRATION RATE: 20 BRPM | WEIGHT: 200.4 LBS | TEMPERATURE: 97.7 F | DIASTOLIC BLOOD PRESSURE: 69 MMHG | HEIGHT: 68 IN

## 2023-05-25 DIAGNOSIS — Z91.81 AT HIGH RISK FOR FALLS: ICD-10-CM

## 2023-05-25 DIAGNOSIS — E78.2 MIXED HYPERLIPIDEMIA: ICD-10-CM

## 2023-05-25 DIAGNOSIS — I10 ESSENTIAL (PRIMARY) HYPERTENSION: ICD-10-CM

## 2023-05-25 DIAGNOSIS — Z00.00 WELCOME TO MEDICARE PREVENTIVE VISIT: Primary | ICD-10-CM

## 2023-05-25 DIAGNOSIS — E55.9 VITAMIN D DEFICIENCY, UNSPECIFIED: ICD-10-CM

## 2023-05-25 DIAGNOSIS — M62.838 MUSCLE SPASM OF SHOULDER REGION: ICD-10-CM

## 2023-05-25 DIAGNOSIS — S49.91XD RIGHT SHOULDER INJURY, SUBSEQUENT ENCOUNTER: ICD-10-CM

## 2023-05-25 PROCEDURE — G0403 EKG FOR INITIAL PREVENT EXAM: HCPCS | Performed by: INTERNAL MEDICINE

## 2023-05-25 RX ORDER — CYCLOBENZAPRINE HCL 10 MG
10 TABLET ORAL 3 TIMES DAILY PRN
Qty: 30 TABLET | Refills: 0 | Status: SHIPPED | OUTPATIENT
Start: 2023-05-25 | End: 2023-06-04

## 2023-05-25 RX ORDER — DIAZEPAM 10 MG/1
TABLET ORAL
COMMUNITY
Start: 2021-10-26

## 2023-05-25 RX ORDER — NIFEDIPINE 30 MG/1
30 TABLET, FILM COATED, EXTENDED RELEASE ORAL DAILY
COMMUNITY
Start: 2023-03-28

## 2023-05-25 RX ORDER — SENNOSIDES 8.6 MG
650 CAPSULE ORAL EVERY 8 HOURS PRN
Qty: 60 TABLET | Refills: 3 | Status: SHIPPED | OUTPATIENT
Start: 2023-05-25

## 2023-05-25 RX ORDER — CYCLOBENZAPRINE HCL 10 MG
10 TABLET ORAL 3 TIMES DAILY PRN
COMMUNITY
Start: 2023-05-02 | End: 2023-05-25 | Stop reason: SDUPTHER

## 2023-05-25 RX ORDER — TRAMADOL HYDROCHLORIDE 50 MG/1
TABLET ORAL
COMMUNITY
End: 2023-05-25 | Stop reason: SDUPTHER

## 2023-05-25 RX ORDER — MELOXICAM 15 MG/1
15 TABLET ORAL DAILY
COMMUNITY
Start: 2023-03-17

## 2023-05-25 SDOH — ECONOMIC STABILITY: INCOME INSECURITY: HOW HARD IS IT FOR YOU TO PAY FOR THE VERY BASICS LIKE FOOD, HOUSING, MEDICAL CARE, AND HEATING?: NOT VERY HARD

## 2023-05-25 SDOH — ECONOMIC STABILITY: FOOD INSECURITY: WITHIN THE PAST 12 MONTHS, YOU WORRIED THAT YOUR FOOD WOULD RUN OUT BEFORE YOU GOT MONEY TO BUY MORE.: NEVER TRUE

## 2023-05-25 SDOH — ECONOMIC STABILITY: HOUSING INSECURITY
IN THE LAST 12 MONTHS, WAS THERE A TIME WHEN YOU DID NOT HAVE A STEADY PLACE TO SLEEP OR SLEPT IN A SHELTER (INCLUDING NOW)?: NO

## 2023-05-25 SDOH — ECONOMIC STABILITY: FOOD INSECURITY: WITHIN THE PAST 12 MONTHS, THE FOOD YOU BOUGHT JUST DIDN'T LAST AND YOU DIDN'T HAVE MONEY TO GET MORE.: NEVER TRUE

## 2023-05-25 ASSESSMENT — LIFESTYLE VARIABLES
HOW OFTEN DURING THE LAST YEAR HAVE YOU NEEDED AN ALCOHOLIC DRINK FIRST THING IN THE MORNING TO GET YOURSELF GOING AFTER A NIGHT OF HEAVY DRINKING: 0
HAVE YOU OR SOMEONE ELSE BEEN INJURED AS A RESULT OF YOUR DRINKING: 0
HOW OFTEN DURING THE LAST YEAR HAVE YOU FOUND THAT YOU WERE NOT ABLE TO STOP DRINKING ONCE YOU HAD STARTED: 1
HOW OFTEN DURING THE LAST YEAR HAVE YOU BEEN UNABLE TO REMEMBER WHAT HAPPENED THE NIGHT BEFORE BECAUSE YOU HAD BEEN DRINKING: 0
HOW OFTEN DO YOU HAVE A DRINK CONTAINING ALCOHOL: 4 OR MORE TIMES A WEEK
HOW OFTEN DURING THE LAST YEAR HAVE YOU HAD A FEELING OF GUILT OR REMORSE AFTER DRINKING: 0
HOW OFTEN DURING THE LAST YEAR HAVE YOU FAILED TO DO WHAT WAS NORMALLY EXPECTED FROM YOU BECAUSE OF DRINKING: 1
HOW MANY STANDARD DRINKS CONTAINING ALCOHOL DO YOU HAVE ON A TYPICAL DAY: 3 OR 4
HAS A RELATIVE, FRIEND, DOCTOR, OR ANOTHER HEALTH PROFESSIONAL EXPRESSED CONCERN ABOUT YOUR DRINKING OR SUGGESTED YOU CUT DOWN: 2

## 2023-05-25 ASSESSMENT — PATIENT HEALTH QUESTIONNAIRE - PHQ9
SUM OF ALL RESPONSES TO PHQ QUESTIONS 1-9: 14
7. TROUBLE CONCENTRATING ON THINGS, SUCH AS READING THE NEWSPAPER OR WATCHING TELEVISION: 3
SUM OF ALL RESPONSES TO PHQ QUESTIONS 1-9: 14
1. LITTLE INTEREST OR PLEASURE IN DOING THINGS: 3
5. POOR APPETITE OR OVEREATING: 0
8. MOVING OR SPEAKING SO SLOWLY THAT OTHER PEOPLE COULD HAVE NOTICED. OR THE OPPOSITE, BEING SO FIGETY OR RESTLESS THAT YOU HAVE BEEN MOVING AROUND A LOT MORE THAN USUAL: 0
9. THOUGHTS THAT YOU WOULD BE BETTER OFF DEAD, OR OF HURTING YOURSELF: 0
SUM OF ALL RESPONSES TO PHQ QUESTIONS 1-9: 14
SUM OF ALL RESPONSES TO PHQ9 QUESTIONS 1 & 2: 6
10. IF YOU CHECKED OFF ANY PROBLEMS, HOW DIFFICULT HAVE THESE PROBLEMS MADE IT FOR YOU TO DO YOUR WORK, TAKE CARE OF THINGS AT HOME, OR GET ALONG WITH OTHER PEOPLE: 1
3. TROUBLE FALLING OR STAYING ASLEEP: 3
2. FEELING DOWN, DEPRESSED OR HOPELESS: 3
4. FEELING TIRED OR HAVING LITTLE ENERGY: 2
6. FEELING BAD ABOUT YOURSELF - OR THAT YOU ARE A FAILURE OR HAVE LET YOURSELF OR YOUR FAMILY DOWN: 0
SUM OF ALL RESPONSES TO PHQ QUESTIONS 1-9: 14

## 2023-06-29 ENCOUNTER — TELEPHONE (OUTPATIENT)
Age: 68
End: 2023-06-29

## 2023-07-03 NOTE — TELEPHONE ENCOUNTER
Please resend script to [facility] as [facility] is no longer accepting new patients or new rxs for botox.

## 2023-07-06 RX ORDER — LEVOTHYROXINE SODIUM 0.15 MG/1
TABLET ORAL
Qty: 30 TABLET | Refills: 0 | Status: SHIPPED | OUTPATIENT
Start: 2023-07-06 | End: 2023-07-17

## 2023-07-11 ENCOUNTER — TELEPHONE (OUTPATIENT)
Age: 68
End: 2023-07-11

## 2023-07-17 RX ORDER — LEVOTHYROXINE SODIUM 0.15 MG/1
TABLET ORAL
Qty: 30 TABLET | Refills: 11 | Status: SHIPPED | OUTPATIENT
Start: 2023-07-17

## 2023-07-17 NOTE — TELEPHONE ENCOUNTER
Spoke with Shira Vaca at [facility].  Botox scheduled to arrive on Tuesday 7/25/23

## 2023-07-25 ENCOUNTER — TELEPHONE (OUTPATIENT)
Age: 68
End: 2023-07-25

## 2023-07-25 NOTE — TELEPHONE ENCOUNTER
200 Units Botox arrived   RX: [id number]  [facility]   [phone]  Lot# [id number]  Exp. 02/2026  Refills: 1

## 2023-08-23 ENCOUNTER — TELEPHONE (OUTPATIENT)
Age: 68
End: 2023-08-23

## 2023-08-23 NOTE — TELEPHONE ENCOUNTER
Patient called to apologize for missing appt. Pt broke her leg so has been having a bit of a hard time keeping up with everything. Requested call back to r/s Botox appt.  [phone]

## 2023-08-31 ENCOUNTER — OFFICE VISIT (OUTPATIENT)
Age: 68
End: 2023-08-31
Payer: MEDICARE

## 2023-08-31 VITALS
HEART RATE: 72 BPM | SYSTOLIC BLOOD PRESSURE: 107 MMHG | BODY MASS INDEX: 30.47 KG/M2 | RESPIRATION RATE: 18 BRPM | TEMPERATURE: 97.5 F | OXYGEN SATURATION: 92 % | HEIGHT: 68 IN | DIASTOLIC BLOOD PRESSURE: 69 MMHG

## 2023-08-31 DIAGNOSIS — R06.02 SOB (SHORTNESS OF BREATH): ICD-10-CM

## 2023-08-31 DIAGNOSIS — E78.5 DYSLIPIDEMIA (HIGH LDL; LOW HDL): ICD-10-CM

## 2023-08-31 DIAGNOSIS — G47.00 INSOMNIA, UNSPECIFIED TYPE: ICD-10-CM

## 2023-08-31 DIAGNOSIS — Z12.31 ENCOUNTER FOR SCREENING MAMMOGRAM FOR BREAST CANCER: ICD-10-CM

## 2023-08-31 DIAGNOSIS — J20.9 ACUTE BRONCHITIS DUE TO INFECTION: Primary | ICD-10-CM

## 2023-08-31 PROCEDURE — 1123F ACP DISCUSS/DSCN MKR DOCD: CPT | Performed by: INTERNAL MEDICINE

## 2023-08-31 PROCEDURE — 99214 OFFICE O/P EST MOD 30 MIN: CPT | Performed by: INTERNAL MEDICINE

## 2023-08-31 RX ORDER — DICLOFENAC SODIUM 75 MG/1
75 TABLET, DELAYED RELEASE ORAL 2 TIMES DAILY
COMMUNITY
Start: 2023-07-26

## 2023-08-31 RX ORDER — AZITHROMYCIN 250 MG/1
250 TABLET, FILM COATED ORAL SEE ADMIN INSTRUCTIONS
Qty: 6 TABLET | Refills: 0 | Status: SHIPPED | OUTPATIENT
Start: 2023-08-31 | End: 2023-09-05

## 2023-08-31 RX ORDER — LANOLIN ALCOHOL/MO/W.PET/CERES
3 CREAM (GRAM) TOPICAL DAILY
Qty: 30 TABLET | Refills: 3 | Status: SHIPPED | OUTPATIENT
Start: 2023-08-31

## 2023-08-31 RX ORDER — PREGABALIN 75 MG/1
75 CAPSULE ORAL 2 TIMES DAILY
COMMUNITY
Start: 2023-08-23

## 2023-08-31 RX ORDER — ATOMOXETINE 40 MG/1
40 CAPSULE ORAL DAILY
COMMUNITY
Start: 2023-08-16

## 2023-08-31 RX ORDER — GABAPENTIN 100 MG/1
CAPSULE ORAL
COMMUNITY
Start: 2023-08-23 | End: 2023-08-31

## 2023-08-31 RX ORDER — RAMELTEON 8 MG/1
8 TABLET ORAL
COMMUNITY
Start: 2023-08-14

## 2023-08-31 RX ORDER — BACLOFEN 10 MG/1
10 TABLET ORAL 3 TIMES DAILY PRN
COMMUNITY
Start: 2023-07-26

## 2023-08-31 RX ORDER — ALBUTEROL SULFATE 90 UG/1
2 AEROSOL, METERED RESPIRATORY (INHALATION) EVERY 6 HOURS PRN
Qty: 18 G | Refills: 3 | Status: SHIPPED | OUTPATIENT
Start: 2023-08-31

## 2023-08-31 RX ORDER — TRAMADOL HYDROCHLORIDE 50 MG/1
TABLET ORAL
COMMUNITY
Start: 2023-08-07 | End: 2023-08-31

## 2023-08-31 SDOH — ECONOMIC STABILITY: FOOD INSECURITY: WITHIN THE PAST 12 MONTHS, YOU WORRIED THAT YOUR FOOD WOULD RUN OUT BEFORE YOU GOT MONEY TO BUY MORE.: NEVER TRUE

## 2023-08-31 SDOH — ECONOMIC STABILITY: INCOME INSECURITY: HOW HARD IS IT FOR YOU TO PAY FOR THE VERY BASICS LIKE FOOD, HOUSING, MEDICAL CARE, AND HEATING?: NOT HARD AT ALL

## 2023-08-31 SDOH — ECONOMIC STABILITY: FOOD INSECURITY: WITHIN THE PAST 12 MONTHS, THE FOOD YOU BOUGHT JUST DIDN'T LAST AND YOU DIDN'T HAVE MONEY TO GET MORE.: NEVER TRUE

## 2023-09-05 ENCOUNTER — TELEPHONE (OUTPATIENT)
Age: 68
End: 2023-09-05

## 2023-09-05 DIAGNOSIS — G43.709 CHRONIC MIGRAINE W/O AURA W/O STATUS MIGRAINOSUS, NOT INTRACTABLE: Primary | ICD-10-CM

## 2023-09-05 RX ORDER — BUTALBITAL, ASPIRIN, AND CAFFEINE 325; 50; 40 MG/1; MG/1; MG/1
CAPSULE ORAL
Qty: 10 CAPSULE | Refills: 0 | Status: SHIPPED | OUTPATIENT
Start: 2023-09-05 | End: 2024-09-03

## 2023-09-05 NOTE — TELEPHONE ENCOUNTER
Spoke with patient. Verified patient with two patient identifiers. Was no show with Dr. Nusrat Leo for Botox on 8-. Is scheduled for Botox with Olvin Costello NP on 10-. States she cannot wait until then as she has had a migraine for 2 days. States Fiorinal with Codeine is the only thing that has worked for her. Asking for something for her migraine. Can we give her something? Tried Tylenol 500 mg, two tabs q8h prn, not helping. Pls advise.

## 2023-09-07 ENCOUNTER — TELEPHONE (OUTPATIENT)
Age: 68
End: 2023-09-07

## 2023-09-07 NOTE — TELEPHONE ENCOUNTER
Spoke with patient. Verified patient with two patient identifiers. Advised fiorinal has been sent to her pharmacy. Patient verbalized understanding. CYNTHIA Contreras - NP sent to Julita Pat LPN  Caller: Unspecified (2 days ago, 11:01 AM)    Rx for 10 Fiorinal sent to pharmacy.    EN

## 2023-09-07 NOTE — TELEPHONE ENCOUNTER
Spoke with patient. Verified patient with two patient identifiers. Advised Fiorinal was sent to pharmacy. Patient verbalized understanding.

## 2023-11-27 DIAGNOSIS — G43.709 CHRONIC MIGRAINE W/O AURA W/O STATUS MIGRAINOSUS, NOT INTRACTABLE: ICD-10-CM

## 2023-11-29 RX ORDER — BUTALBITAL, ASPIRIN, AND CAFFEINE 325; 50; 40 MG/1; MG/1; MG/1
CAPSULE ORAL
Qty: 10 CAPSULE | Refills: 1 | Status: SHIPPED | OUTPATIENT
Start: 2023-11-29 | End: 2023-12-27

## 2023-11-29 NOTE — TELEPHONE ENCOUNTER
Last office visit 08/22/2023  Next office visit 01/03/2024  Last med refill 09/05/2023 with 0 refills

## 2023-12-04 NOTE — TELEPHONE ENCOUNTER
Patient is scheduled for botox on 1/3/24. She needs a new script as the last has .  Please route back to me as it will need a PA

## 2023-12-12 ENCOUNTER — TELEPHONE (OUTPATIENT)
Age: 68
End: 2023-12-12

## 2023-12-12 NOTE — TELEPHONE ENCOUNTER
Drug Acquisition: PHARMACY  Drug: BOTOX 200 units, Dx: Q20.044 (Chronic Migraine)  Insurance: [employer]  Submission Type: [facility]  HCPCS:   Key: [id number]  PA Case ID: [id number]  Approval Range: 12/12/23 to 03/12/24  SPP: [place] to [address].

## 2023-12-16 ENCOUNTER — TELEPHONE (OUTPATIENT)
Age: 68
End: 2023-12-16

## 2023-12-16 NOTE — TELEPHONE ENCOUNTER
PSR called but was unable to leave a message as the mailbox is full. PSR was attempting to call to verify the patient's insurance information for her Botox appointment for next year.

## 2023-12-18 DIAGNOSIS — M62.838 MUSCLE SPASM OF SHOULDER REGION: ICD-10-CM

## 2023-12-18 DIAGNOSIS — Z00.00 MEDICARE ANNUAL WELLNESS VISIT, SUBSEQUENT: ICD-10-CM

## 2023-12-18 DIAGNOSIS — S49.91XD RIGHT SHOULDER INJURY, SUBSEQUENT ENCOUNTER: ICD-10-CM

## 2023-12-18 DIAGNOSIS — E55.9 VITAMIN D DEFICIENCY, UNSPECIFIED: ICD-10-CM

## 2023-12-18 DIAGNOSIS — R73.03 BORDERLINE TYPE 2 DIABETES MELLITUS: ICD-10-CM

## 2023-12-18 DIAGNOSIS — E03.9 ACQUIRED HYPOTHYROIDISM: ICD-10-CM

## 2023-12-18 DIAGNOSIS — G43.709 CHRONIC MIGRAINE W/O AURA W/O STATUS MIGRAINOSUS, NOT INTRACTABLE: ICD-10-CM

## 2023-12-18 DIAGNOSIS — E78.5 DYSLIPIDEMIA (HIGH LDL; LOW HDL): ICD-10-CM

## 2023-12-18 DIAGNOSIS — N30.00 ACUTE CYSTITIS WITHOUT HEMATURIA: ICD-10-CM

## 2023-12-18 PROBLEM — S00.83XA FACIAL HEMATOMA: Status: ACTIVE | Noted: 2021-04-16

## 2023-12-18 PROBLEM — Z20.822 PERSON UNDER INVESTIGATION FOR COVID-19: Status: ACTIVE | Noted: 2022-12-16

## 2023-12-18 PROBLEM — S80.00XA CONTUSION OF KNEE: Status: ACTIVE | Noted: 2019-04-02

## 2023-12-18 PROBLEM — J20.9 ACUTE BRONCHITIS: Status: ACTIVE | Noted: 2022-12-16

## 2023-12-18 PROBLEM — S16.1XXA CERVICAL STRAIN, ACUTE: Status: ACTIVE | Noted: 2020-01-16

## 2023-12-18 PROBLEM — S20.229A CONTUSION, BACK: Status: ACTIVE | Noted: 2023-04-13

## 2023-12-18 PROBLEM — S83.90XA KNEE SPRAIN: Status: ACTIVE | Noted: 2021-04-09

## 2023-12-18 PROBLEM — T67.8XXA: Status: ACTIVE | Noted: 2023-09-06

## 2023-12-18 PROBLEM — B34.9 VIRAL INFECTION: Status: ACTIVE | Noted: 2022-12-16

## 2023-12-19 LAB
25(OH)D3 SERPL-MCNC: 37.4 NG/ML (ref 30–100)
ALBUMIN SERPL-MCNC: 3.9 G/DL (ref 3.5–5)
ALBUMIN/GLOB SERPL: 1.3 (ref 1.1–2.2)
ALP SERPL-CCNC: 99 U/L (ref 45–117)
ALT SERPL-CCNC: 30 U/L (ref 12–78)
AMORPH CRY URNS QL MICRO: ABNORMAL
ANION GAP SERPL CALC-SCNC: 9 MMOL/L (ref 5–15)
APPEARANCE UR: CLEAR
AST SERPL-CCNC: 16 U/L (ref 15–37)
BACTERIA URNS QL MICRO: ABNORMAL /HPF
BASOPHILS # BLD: 0 K/UL (ref 0–0.1)
BASOPHILS NFR BLD: 1 % (ref 0–1)
BILIRUB SERPL-MCNC: 0.2 MG/DL (ref 0.2–1)
BILIRUB UR QL: NEGATIVE
BUN SERPL-MCNC: 18 MG/DL (ref 6–20)
BUN/CREAT SERPL: 23 (ref 12–20)
CALCIUM SERPL-MCNC: 9 MG/DL (ref 8.5–10.1)
CHLORIDE SERPL-SCNC: 112 MMOL/L (ref 97–108)
CHOLEST SERPL-MCNC: 189 MG/DL
CO2 SERPL-SCNC: 20 MMOL/L (ref 21–32)
COLOR UR: ABNORMAL
CREAT SERPL-MCNC: 0.8 MG/DL (ref 0.55–1.02)
DIFFERENTIAL METHOD BLD: NORMAL
EOSINOPHIL # BLD: 0.1 K/UL (ref 0–0.4)
EOSINOPHIL NFR BLD: 2 % (ref 0–7)
EPITH CASTS URNS QL MICRO: ABNORMAL /LPF
ERYTHROCYTE [DISTWIDTH] IN BLOOD BY AUTOMATED COUNT: 12.2 % (ref 11.5–14.5)
EST. AVERAGE GLUCOSE BLD GHB EST-MCNC: 97 MG/DL
GLOBULIN SER CALC-MCNC: 2.9 G/DL (ref 2–4)
GLUCOSE SERPL-MCNC: 103 MG/DL (ref 65–100)
GLUCOSE UR STRIP.AUTO-MCNC: NEGATIVE MG/DL
HBA1C MFR BLD: 5 % (ref 4–5.6)
HCT VFR BLD AUTO: 37.8 % (ref 35–47)
HDLC SERPL-MCNC: 50 MG/DL
HDLC SERPL: 3.8 (ref 0–5)
HGB BLD-MCNC: 12.3 G/DL (ref 11.5–16)
HGB UR QL STRIP: NEGATIVE
IMM GRANULOCYTES # BLD AUTO: 0 K/UL (ref 0–0.04)
IMM GRANULOCYTES NFR BLD AUTO: 0 % (ref 0–0.5)
KETONES UR QL STRIP.AUTO: NEGATIVE MG/DL
LDLC SERPL CALC-MCNC: 102 MG/DL (ref 0–100)
LEUKOCYTE ESTERASE UR QL STRIP.AUTO: ABNORMAL
LYMPHOCYTES # BLD: 2.4 K/UL (ref 0.8–3.5)
LYMPHOCYTES NFR BLD: 43 % (ref 12–49)
MCH RBC QN AUTO: 30.6 PG (ref 26–34)
MCHC RBC AUTO-ENTMCNC: 32.5 G/DL (ref 30–36.5)
MCV RBC AUTO: 94 FL (ref 80–99)
MONOCYTES # BLD: 0.5 K/UL (ref 0–1)
MONOCYTES NFR BLD: 9 % (ref 5–13)
NEUTS SEG # BLD: 2.6 K/UL (ref 1.8–8)
NEUTS SEG NFR BLD: 45 % (ref 32–75)
NITRITE UR QL STRIP.AUTO: NEGATIVE
NRBC # BLD: 0 K/UL (ref 0–0.01)
NRBC BLD-RTO: 0 PER 100 WBC
PH UR STRIP: 7 (ref 5–8)
PLATELET # BLD AUTO: 231 K/UL (ref 150–400)
PMV BLD AUTO: 11.4 FL (ref 8.9–12.9)
POTASSIUM SERPL-SCNC: 3.6 MMOL/L (ref 3.5–5.1)
PROT SERPL-MCNC: 6.8 G/DL (ref 6.4–8.2)
PROT UR STRIP-MCNC: NEGATIVE MG/DL
RBC # BLD AUTO: 4.02 M/UL (ref 3.8–5.2)
RBC #/AREA URNS HPF: ABNORMAL /HPF (ref 0–5)
SODIUM SERPL-SCNC: 141 MMOL/L (ref 136–145)
SP GR UR REFRACTOMETRY: 1.01 (ref 1–1.03)
TRIGL SERPL-MCNC: 185 MG/DL
TSH SERPL DL<=0.05 MIU/L-ACNC: 0.2 UIU/ML (ref 0.36–3.74)
URINE CULTURE IF INDICATED: ABNORMAL
UROBILINOGEN UR QL STRIP.AUTO: 0.2 EU/DL (ref 0.2–1)
VLDLC SERPL CALC-MCNC: 37 MG/DL
WBC # BLD AUTO: 5.7 K/UL (ref 3.6–11)
WBC URNS QL MICRO: ABNORMAL /HPF (ref 0–4)

## 2024-01-03 ENCOUNTER — PROCEDURE VISIT (OUTPATIENT)
Age: 69
End: 2024-01-03
Payer: MEDICARE

## 2024-01-03 DIAGNOSIS — Z92.29 S/P BOTOX INJECTION: ICD-10-CM

## 2024-01-03 DIAGNOSIS — G43.709 CHRONIC MIGRAINE W/O AURA W/O STATUS MIGRAINOSUS, NOT INTRACTABLE: Primary | ICD-10-CM

## 2024-01-03 PROCEDURE — 64615 CHEMODENERV MUSC MIGRAINE: CPT | Performed by: NURSE PRACTITIONER

## 2024-01-03 NOTE — PROGRESS NOTES
Botox Injection Note       ICD-10-CM    1. Chronic migraine w/o aura w/o status migrainosus, not intractable  G43.709       2. S/P Botox injection  Z92.29            Indication: patient has chronic recurrent migraine.      Procedure:   Botox concentration: 200 units in 4 ml of preservative-free normal saline.       31 sites injections, distribution as follow      Units/site  Sites Sides Subtotal    Procerus 5 1 1 5    5 1 2 10   Frontalis 5 2 2 20   Temporalis 5 4 2 40   Occipitalis 5 3 2 30   Upper cervical paraspinalis 5 2 2 20   Trapezius 5 3 2 30         200 units Botox were reconstituted, 155 units injected as above and the remainder was unavoidably wasted.     Last procedure 5/23/2023.   Patient tolerated procedure well.   Chema has been helpful b/c she has not had the botox in several months due to insurance.  Migraines are much worse, has had 21 migraine in the last 7 months or so.  With the Botox, she was getting no migraines.  Pt is having cervical surgery  Dr Jm DENSON next week, had a fall in July that seemed to worsen her neck pain.      _____________________________   partha Boswell APRN - NP

## 2024-03-14 ENCOUNTER — TELEPHONE (OUTPATIENT)
Age: 69
End: 2024-03-14

## 2024-03-14 NOTE — TELEPHONE ENCOUNTER
Botox Drug Acquisition: BUY AND BILL  Drug: BOTOX 200 units Dx: G43.709  Insurance: [facility] - PRIMARY  Submission Type: [facility] portal  [facility]:   Reference #: [id number]   Approval Range: 03/14/24 to 03/14/25      Botox Drug Acquisition: BUY AND BILL  Drug: BOTOX 200 units Dx: G43.709  Insurance: [employer]- SECONDARY  [facility]  CPT: 41378 (PROCEDURE VISIT)  Request Tracking ID: [id number]   Reference #: [id number]   Approval Range: 03/14/24 to 05/12/24    Botox Drug Acquisition: BUY AND BILL  Drug: BOTOX 200 units Dx: G43.709  Insurance: [employer]- SECONDARY  Submission Type: [facility]:  (BOTOX VIAL)  Request Tracking ID: [id number]  Reference #:  Approval Range: *CASE RESUBMITTED AS OF 06/18/24*     Please follow encounter dated 06/10/24

## 2024-03-18 ENCOUNTER — OFFICE VISIT (OUTPATIENT)
Age: 69
End: 2024-03-18
Payer: MEDICARE

## 2024-03-18 VITALS
HEIGHT: 68 IN | HEART RATE: 76 BPM | SYSTOLIC BLOOD PRESSURE: 115 MMHG | RESPIRATION RATE: 20 BRPM | WEIGHT: 174 LBS | BODY MASS INDEX: 26.37 KG/M2 | DIASTOLIC BLOOD PRESSURE: 62 MMHG | OXYGEN SATURATION: 98 % | TEMPERATURE: 96 F

## 2024-03-18 DIAGNOSIS — E03.9 ACQUIRED HYPOTHYROIDISM: ICD-10-CM

## 2024-03-18 DIAGNOSIS — E78.5 DYSLIPIDEMIA (HIGH LDL; LOW HDL): ICD-10-CM

## 2024-03-18 DIAGNOSIS — E55.9 VITAMIN D DEFICIENCY: ICD-10-CM

## 2024-03-18 DIAGNOSIS — Z00.00 MEDICARE ANNUAL WELLNESS VISIT, SUBSEQUENT: Primary | ICD-10-CM

## 2024-03-18 PROBLEM — Z78.9 ALCOHOL USE: Status: ACTIVE | Noted: 2023-12-27

## 2024-03-18 PROBLEM — R26.89 BALANCE PROBLEMS: Status: ACTIVE | Noted: 2023-12-27

## 2024-03-18 PROBLEM — F10.90 ALCOHOL USE: Status: ACTIVE | Noted: 2023-12-27

## 2024-03-18 PROBLEM — M54.2 NECK PAIN: Status: ACTIVE | Noted: 2024-01-09

## 2024-03-18 PROBLEM — R20.0 ANESTHESIA: Status: ACTIVE | Noted: 2023-12-27

## 2024-03-18 PROCEDURE — G0439 PPPS, SUBSEQ VISIT: HCPCS | Performed by: INTERNAL MEDICINE

## 2024-03-18 PROCEDURE — 1123F ACP DISCUSS/DSCN MKR DOCD: CPT | Performed by: INTERNAL MEDICINE

## 2024-03-18 RX ORDER — NIFEDIPINE 30 MG
30 TABLET, EXTENDED RELEASE ORAL DAILY
COMMUNITY
Start: 2024-02-25 | End: 2024-03-18

## 2024-03-18 RX ORDER — METHOCARBAMOL 750 MG/1
750 TABLET, FILM COATED ORAL 4 TIMES DAILY
COMMUNITY
Start: 2024-02-15

## 2024-03-18 RX ORDER — PREGABALIN 75 MG/1
75 CAPSULE ORAL 2 TIMES DAILY
COMMUNITY
Start: 2024-02-01

## 2024-03-18 RX ORDER — OXYCODONE HYDROCHLORIDE AND ACETAMINOPHEN 5; 325 MG/1; MG/1
1 TABLET ORAL EVERY 6 HOURS PRN
COMMUNITY
Start: 2024-03-16

## 2024-03-18 ASSESSMENT — PATIENT HEALTH QUESTIONNAIRE - PHQ9
2. FEELING DOWN, DEPRESSED OR HOPELESS: NOT AT ALL
9. THOUGHTS THAT YOU WOULD BE BETTER OFF DEAD, OR OF HURTING YOURSELF: NOT AT ALL
10. IF YOU CHECKED OFF ANY PROBLEMS, HOW DIFFICULT HAVE THESE PROBLEMS MADE IT FOR YOU TO DO YOUR WORK, TAKE CARE OF THINGS AT HOME, OR GET ALONG WITH OTHER PEOPLE: NOT DIFFICULT AT ALL
SUM OF ALL RESPONSES TO PHQ QUESTIONS 1-9: 0
SUM OF ALL RESPONSES TO PHQ QUESTIONS 1-9: 0
4. FEELING TIRED OR HAVING LITTLE ENERGY: NOT AT ALL
5. POOR APPETITE OR OVEREATING: NOT AT ALL
SUM OF ALL RESPONSES TO PHQ QUESTIONS 1-9: 0
3. TROUBLE FALLING OR STAYING ASLEEP: NOT AT ALL
1. LITTLE INTEREST OR PLEASURE IN DOING THINGS: NOT AT ALL
SUM OF ALL RESPONSES TO PHQ9 QUESTIONS 1 & 2: 0
SUM OF ALL RESPONSES TO PHQ QUESTIONS 1-9: 0
6. FEELING BAD ABOUT YOURSELF - OR THAT YOU ARE A FAILURE OR HAVE LET YOURSELF OR YOUR FAMILY DOWN: NOT AT ALL
8. MOVING OR SPEAKING SO SLOWLY THAT OTHER PEOPLE COULD HAVE NOTICED. OR THE OPPOSITE, BEING SO FIGETY OR RESTLESS THAT YOU HAVE BEEN MOVING AROUND A LOT MORE THAN USUAL: NOT AT ALL
7. TROUBLE CONCENTRATING ON THINGS, SUCH AS READING THE NEWSPAPER OR WATCHING TELEVISION: NOT AT ALL

## 2024-03-18 NOTE — PROGRESS NOTES
11:41 AM     Reviewed PDMP [1]          Activity, Diet, and Weight:               Body mass index is 26.46 kg/m².      Inactivity Interventions:  Recommendations: patient agrees to exercise for at least 150 minutes/week          Objective   Vitals:    03/18/24 1101   BP: 115/62   Pulse: 76   Resp: 20   Temp: (!) 96 °F (35.6 °C)   TempSrc: Temporal   SpO2: 98%   Weight: 78.9 kg (174 lb)   Height: 1.727 m (5' 8\")      Body mass index is 26.46 kg/m².             Allergies   Allergen Reactions    Ibuprofen      Other reaction(s): Unknown (comments)  ulcers    Nsaids Other (See Comments)     Prior to Visit Medications    Medication Sig Taking? Authorizing Provider   pregabalin (LYRICA) 75 MG capsule Take 1 capsule by mouth 2 times daily. Yes Jeanna Butcher MD   oxyCODONE-acetaminophen (PERCOCET) 5-325 MG per tablet Take 1 tablet by mouth every 6 hours as needed. Yes Jeanna Butcher MD   methocarbamol (ROBAXIN) 750 MG tablet Take 1 tablet by mouth 4 times daily Yes Jeanna Butcher MD   Cholecalciferol (VITAMIN D3) 50 MCG (2000 UT) CHEW Take 1 capsule by mouth daily Yes Wilmer Arce MD   acetaminophen (TYLENOL) 650 MG extended release tablet Take 1 tablet by mouth every 8 hours as needed for Pain Yes Wilmer Arce MD   albuterol sulfate HFA (PROVENTIL HFA) 108 (90 Base) MCG/ACT inhaler Inhale 2 puffs into the lungs every 6 hours as needed for Wheezing Yes Wilmer Arce MD   baclofen (LIORESAL) 10 MG tablet Take 1 tablet by mouth 3 times daily as needed (muscle spasm) Yes Wilmer Arce MD   ramelteon (ROZEREM) 8 MG tablet Take 1 tablet by mouth at bedtime. Yes Jeanna Butcher MD   diclofenac (VOLTAREN) 75 MG EC tablet Take 1 tablet by mouth 2 times daily Yes Jeanna Butcher MD   atomoxetine (STRATTERA) 40 MG capsule Take 1 capsule by mouth daily Yes Jeanna Butcher MD   levothyroxine (SYNTHROID) 150 MCG tablet TAKE 1 TABLET BY MOUTH ONCE DAILY BEFORE BREAKFAST Yes

## 2024-03-22 NOTE — PATIENT INSTRUCTIONS
other high-salt, high-fat, processed foods.     Read food labels and try to avoid saturated and trans fats. They increase your risk of heart disease by raising cholesterol levels.     Limit the amount of solid fat--butter, margarine, and shortening--you eat. Use olive, peanut, or canola oil when you cook. Bake, broil, and steam foods instead of frying them.     Eat a variety of fruit and vegetables every day. Dark green, deep orange, red, or yellow fruits and vegetables are especially good for you. Examples include spinach, carrots, peaches, and berries.     Foods high in fiber can reduce your cholesterol and provide important vitamins and minerals. High-fiber foods include whole-grain cereals and breads, oatmeal, beans, brown rice, citrus fruits, and apples.     Eat lean proteins. Heart-healthy proteins include seafood, lean meats and poultry, eggs, beans, peas, nuts, seeds, and soy products.     Limit drinks and foods with added sugar. These include candy, desserts, and soda pop.   Heart-healthy lifestyle    If your doctor recommends it, get more exercise. For many people, walking is a good choice. Or you may want to swim, bike, or do other activities. Bit by bit, increase the time you're active every day. Try for at least 30 minutes on most days of the week.     Try to quit or cut back on using tobacco and other nicotine products. This includes smoking and vaping. If you need help quitting, talk to your doctor about stop-smoking programs and medicines. These can increase your chances of quitting for good. Quitting is one of the most important things you can do to protect your heart. It is never too late to quit. Try to avoid secondhand smoke too.     Stay at a weight that's healthy for you. Talk to your doctor if you need help losing weight.     Try to get 7 to 9 hours of sleep each night.     Limit alcohol to 2 drinks a day for men and 1 drink a day for women. Too much alcohol can cause health problems.

## 2024-03-27 ENCOUNTER — PROCEDURE VISIT (OUTPATIENT)
Age: 69
End: 2024-03-27

## 2024-03-27 VITALS
SYSTOLIC BLOOD PRESSURE: 114 MMHG | DIASTOLIC BLOOD PRESSURE: 68 MMHG | HEART RATE: 87 BPM | OXYGEN SATURATION: 97 % | RESPIRATION RATE: 18 BRPM

## 2024-03-27 DIAGNOSIS — G43.709 CHRONIC MIGRAINE W/O AURA W/O STATUS MIGRAINOSUS, NOT INTRACTABLE: Primary | ICD-10-CM

## 2024-03-27 DIAGNOSIS — Z92.29 S/P BOTOX INJECTION: ICD-10-CM

## 2024-03-27 NOTE — PROGRESS NOTES
Botox Injection Note       ICD-10-CM    1. Chronic migraine w/o aura w/o status migrainosus, not intractable  G43.709       2. S/P Botox injection  Z92.29            Indication: patient has chronic recurrent migraine.      Procedure:   Botox concentration: 200 units in 4 ml of preservative-free normal saline.       31 sites injections, distribution as follow      Units/site  Sites Sides Subtotal    Procerus 5 1 1 5    5 1 2 10   Frontalis 5 2 2 20   Temporalis 5 4 2 40   Occipitalis 5 3 2 30   Upper cervical paraspinalis 5 2 2 20   Trapezius 5 3 2 30         200 units Botox were reconstituted, 155 units injected as above and the remainder was unavoidably wasted.     Patient tolerated procedure well.       _____________________________   partha Boswell APRN - NP

## 2024-05-05 NOTE — TELEPHONE ENCOUNTER
VOICEMAIL    Pt called about botox that was delivered on 6/10/22. As far as she's concerned she fired Lenore Waters at their last visit. She feels that we stole her botox and will get a  if need be.  Please call [phone] Contraindicated

## 2024-05-20 RX ORDER — FERROUS SULFATE 325(65) MG
5000 TABLET ORAL DAILY
Qty: 30 CAPSULE | Refills: 0 | Status: SHIPPED | OUTPATIENT
Start: 2024-05-20

## 2024-05-24 ENCOUNTER — TELEPHONE (OUTPATIENT)
Age: 69
End: 2024-05-24

## 2024-05-24 NOTE — TELEPHONE ENCOUNTER
Patient called stating that she has been having diarrhea every other day for a about 3 weeks.    Patient also stated that she has fallen 5 x in 3 weeks with 1 time going to the ER ([facility])    Patient # [phone]

## 2024-06-05 ENCOUNTER — TELEPHONE (OUTPATIENT)
Age: 69
End: 2024-06-05

## 2024-06-05 NOTE — TELEPHONE ENCOUNTER
----- Message from Tyrone Gutierrez sent at 6/4/2024  3:23 PM EDT -----  Subject: Message to Provider    QUESTIONS  Information for Provider? patient has had diarrhea and, sleeping 14 hrs   and falls for past month. received message 2 weeks ago to go to hospital   says that she didn't know what to tell them so didn't go also did not show   for 5/29/24 appt. patient wants to know if she should wait for 6/17/24 or   go to hospital and what to tell themat ED when she goes to the hospital  ---------------------------------------------------------------------------  --------------  CALL BACK INFO  [phone]; OK to leave message on voicemail  ---------------------------------------------------------------------------  --------------  SCRIPT ANSWERS  Relationship to Patient? Self

## 2024-06-07 ENCOUNTER — TELEMEDICINE (OUTPATIENT)
Age: 69
End: 2024-06-07
Payer: MEDICARE

## 2024-06-07 DIAGNOSIS — R19.7 DIARRHEA, UNSPECIFIED TYPE: Primary | ICD-10-CM

## 2024-06-07 DIAGNOSIS — K58.0 IRRITABLE BOWEL SYNDROME WITH DIARRHEA: ICD-10-CM

## 2024-06-07 DIAGNOSIS — E55.9 VITAMIN D DEFICIENCY: ICD-10-CM

## 2024-06-07 DIAGNOSIS — F31.75 BIPOLAR DISORDER, IN PARTIAL REMISSION, MOST RECENT EPISODE DEPRESSED (HCC): ICD-10-CM

## 2024-06-07 DIAGNOSIS — F60.3 BORDERLINE PERSONALITY DISORDER (HCC): ICD-10-CM

## 2024-06-07 DIAGNOSIS — E03.9 ACQUIRED HYPOTHYROIDISM: ICD-10-CM

## 2024-06-07 DIAGNOSIS — E78.5 DYSLIPIDEMIA (HIGH LDL; LOW HDL): ICD-10-CM

## 2024-06-07 PROCEDURE — 1123F ACP DISCUSS/DSCN MKR DOCD: CPT | Performed by: INTERNAL MEDICINE

## 2024-06-07 PROCEDURE — 99214 OFFICE O/P EST MOD 30 MIN: CPT | Performed by: INTERNAL MEDICINE

## 2024-06-07 RX ORDER — CYCLOBENZAPRINE HCL 10 MG
TABLET ORAL
COMMUNITY
Start: 2024-05-10

## 2024-06-07 RX ORDER — CIPROFLOXACIN 500 MG/1
500 TABLET, FILM COATED ORAL 2 TIMES DAILY
Qty: 14 TABLET | Refills: 0 | Status: SHIPPED | OUTPATIENT
Start: 2024-06-07 | End: 2024-06-14

## 2024-06-07 RX ORDER — GUANFACINE 2 MG/1
1 TABLET, EXTENDED RELEASE ORAL 2 TIMES DAILY
COMMUNITY
Start: 2024-05-09

## 2024-06-07 RX ORDER — METRONIDAZOLE 250 MG/1
250 TABLET ORAL 3 TIMES DAILY
Qty: 21 TABLET | Refills: 0 | Status: SHIPPED | OUTPATIENT
Start: 2024-06-07 | End: 2024-06-14

## 2024-06-07 ASSESSMENT — PATIENT HEALTH QUESTIONNAIRE - PHQ9
SUM OF ALL RESPONSES TO PHQ9 QUESTIONS 1 & 2: 0
2. FEELING DOWN, DEPRESSED OR HOPELESS: NOT AT ALL
1. LITTLE INTEREST OR PLEASURE IN DOING THINGS: NOT AT ALL
SUM OF ALL RESPONSES TO PHQ QUESTIONS 1-9: 0

## 2024-06-07 NOTE — PROGRESS NOTES
Chief Complaint   Patient presents with    Fatigue    Fall     Has fallen 5 times in 2 months     1. Have you been to the ER, urgent care clinic since your last visit?  Hospitalized since your last visit?Yes ER    2. Have you seen or consulted any other health care providers outside of the [facility] since your last visit?  Include any pap smears or colon screening. Yes

## 2024-06-07 NOTE — PROGRESS NOTES
Loli Bazan, was evaluated through a synchronous (real-time) audio-video encounter. The patient (or guardian if applicable) is aware that this is a billable service, which includes applicable co-pays. This Virtual Visit was conducted with patient's (and/or legal guardian's) consent. Patient identification was verified, and a caregiver was present when appropriate.   The patient was located at Home: [address] [place] [address]  Provider was located at Facility (Appt Dept): [address] [place] [address] [address]  [place] [address]  Confirm you are appropriately licensed, registered, or certified to deliver care in the state where the patient is located as indicated above. If you are not or unsure, please re-schedule the visit: Yes, I confirm.     Loli Bazan (:  1955) is a Established patient, presenting virtually for evaluation of the following:    Assessment & Plan   Below is the assessment and plan developed based on review of pertinent history, physical exam, labs, studies, and medications.  1. Diarrhea, unspecified type  -     [facility] - Bassam Lakhani MD, Gastroenterology, [place] ([address])  -     metroNIDAZOLE (FLAGYL) 250 MG tablet; Take 1 tablet by mouth 3 times daily for 7 days, Disp-21 tablet, R-0Normal  -     ciprofloxacin (CIPRO) 500 MG tablet; Take 1 tablet by mouth 2 times daily for 7 days, Disp-14 tablet, R-0Normal  -     Comprehensive Metabolic Panel; Future  -     CBC with Auto Differential; Future  -     Vitamin B12 & Folate; Future  2. Borderline personality disorder (HCC)  -     Comprehensive Metabolic Panel; Future  -     CBC with Auto Differential; Future  3. Bipolar disorder, in partial remission, most recent episode depressed (HCC)  -     Comprehensive Metabolic Panel; Future  -     CBC with Auto Differential; Future  4. Irritable bowel syndrome with diarrhea  -     Comprehensive Metabolic Panel; Future  -     CBC with Auto Differential; Future  -     Vitamin B12

## 2024-06-10 ENCOUNTER — TELEPHONE (OUTPATIENT)
Age: 69
End: 2024-06-10

## 2024-06-10 NOTE — TELEPHONE ENCOUNTER
Hey April     Is she all set for the Botox? She is scheduled to come in on 7/3/24.    Per notes:  Botox Drug Acquisition: BUY AND BILL  Drug: BOTOX 200 units Dx: G43.709  Insurance: [facility] - PRIMARY  Submission Type: [facility] portal  [facility]   Reference #: [id number]   Approval Range: 03/14/24 to 03/14/25        Botox Drug Acquisition: BUY AND BILL  Drug: BOTOX 200 units Dx: G43.709  Insurance: [facility]- SECONDARY  Submission Type: Availity  CPT: 47648 (PROCEDURE VISIT)  Request Tracking ID: [id number]   Reference #: [id number]   Approval Range: *PENDING*    So is Procedure visit still pending?

## 2024-06-18 NOTE — TELEPHONE ENCOUNTER
Her primary policy through [facility] is approved, however I am having to resubmit for secondary as Marleni is showing us as out of network incorrectly.     Botox Drug Acquisition: BUY AND BILL  Drug: BOTOX 200 units Dx: G43.709  Insurance: [place] [employer] (Secondary ins policy)  Submission Type: Availity  [facility]S:   CPT: 18799  Availity Request Tracking ID: [id number]  Reference #: [id number]   Approval Range: *DENIED*    APPEAL CASE ID: [id number] submitted on 06/25/24    Per Nurse Marla BHAKTA Patient completed and uploaded appeal request form on 06/27/24  Per appeal consent letter form must be mailed back, however it was not.    Called anthem 07/03/24 at 11:49 am, spoke with rep ref # [id number], explained mailing at this time would be a major inconvience as we need to keep patient on 12 week schedule. Rep provided fax and email to submit appeal consent form and supporting documentation:    Fax : [phone]- submitted 12:12 pm on 07/03/24 with consent form signed by patient  Email: [email or web address]- not submitted, as we do not use our email for authorizations    Rep also advised to be aware that a true appeal, such as this, can take an upward of 45 days to review and make a decision. Advised nurse of this information and uploaded documents faxed over

## 2024-06-22 ENCOUNTER — HOSPITAL ENCOUNTER (EMERGENCY)
Facility: HOSPITAL | Age: 69
Discharge: HOME | End: 2024-06-22
Attending: EMERGENCY MEDICINE
Payer: MEDICARE

## 2024-06-22 ENCOUNTER — APPOINTMENT (OUTPATIENT)
Facility: HOSPITAL | Age: 69
End: 2024-06-22
Payer: MEDICARE

## 2024-06-22 VITALS
OXYGEN SATURATION: 94 % | WEIGHT: 183.2 LBS | HEART RATE: 81 BPM | TEMPERATURE: 97 F | SYSTOLIC BLOOD PRESSURE: 135 MMHG | RESPIRATION RATE: 18 BRPM | DIASTOLIC BLOOD PRESSURE: 69 MMHG | HEIGHT: 69 IN | BODY MASS INDEX: 27.13 KG/M2

## 2024-06-22 DIAGNOSIS — K59.00 CONSTIPATION, UNSPECIFIED CONSTIPATION TYPE: Primary | ICD-10-CM

## 2024-06-22 LAB
ANION GAP SERPL CALC-SCNC: 4 MMOL/L (ref 5–15)
BASOPHILS # BLD: 0 K/UL (ref 0–0.1)
BASOPHILS NFR BLD: 1 % (ref 0–1)
BUN SERPL-MCNC: 18 MG/DL (ref 6–20)
BUN/CREAT SERPL: 23 (ref 12–20)
CALCIUM SERPL-MCNC: 9.3 MG/DL (ref 8.5–10.1)
CHLORIDE SERPL-SCNC: 108 MMOL/L (ref 97–108)
CO2 SERPL-SCNC: 28 MMOL/L (ref 21–32)
CREAT SERPL-MCNC: 0.77 MG/DL (ref 0.55–1.02)
DIFFERENTIAL METHOD BLD: NORMAL
EOSINOPHIL # BLD: 0.1 K/UL (ref 0–0.4)
EOSINOPHIL NFR BLD: 1 % (ref 0–7)
ERYTHROCYTE [DISTWIDTH] IN BLOOD BY AUTOMATED COUNT: 12.1 % (ref 11.5–14.5)
GLUCOSE SERPL-MCNC: 112 MG/DL (ref 65–100)
HCT VFR BLD AUTO: 39.8 % (ref 35–47)
HGB BLD-MCNC: 12.9 G/DL (ref 11.5–16)
IMM GRANULOCYTES # BLD AUTO: 0 K/UL (ref 0–0.04)
IMM GRANULOCYTES NFR BLD AUTO: 0 % (ref 0–0.5)
LYMPHOCYTES # BLD: 1.7 K/UL (ref 0.8–3.5)
LYMPHOCYTES NFR BLD: 27 % (ref 12–49)
MCH RBC QN AUTO: 31.3 PG (ref 26–34)
MCHC RBC AUTO-ENTMCNC: 32.4 G/DL (ref 30–36.5)
MCV RBC AUTO: 96.6 FL (ref 80–99)
MONOCYTES # BLD: 0.7 K/UL (ref 0–1)
MONOCYTES NFR BLD: 11 % (ref 5–13)
NEUTS SEG # BLD: 3.7 K/UL (ref 1.8–8)
NEUTS SEG NFR BLD: 60 % (ref 32–75)
NRBC # BLD: 0 K/UL (ref 0–0.01)
NRBC BLD-RTO: 0 PER 100 WBC
PLATELET # BLD AUTO: 241 K/UL (ref 150–400)
PMV BLD AUTO: 10.3 FL (ref 8.9–12.9)
POTASSIUM SERPL-SCNC: 4.2 MMOL/L (ref 3.5–5.1)
RBC # BLD AUTO: 4.12 M/UL (ref 3.8–5.2)
SODIUM SERPL-SCNC: 140 MMOL/L (ref 136–145)
WBC # BLD AUTO: 6.2 K/UL (ref 3.6–11)

## 2024-06-22 PROCEDURE — 36415 COLL VENOUS BLD VENIPUNCTURE: CPT

## 2024-06-22 PROCEDURE — 74018 RADEX ABDOMEN 1 VIEW: CPT

## 2024-06-22 PROCEDURE — 6370000000 HC RX 637 (ALT 250 FOR IP): Performed by: EMERGENCY MEDICINE

## 2024-06-22 PROCEDURE — 99284 EMERGENCY DEPT VISIT MOD MDM: CPT

## 2024-06-22 PROCEDURE — 96360 HYDRATION IV INFUSION INIT: CPT

## 2024-06-22 PROCEDURE — 2580000003 HC RX 258: Performed by: EMERGENCY MEDICINE

## 2024-06-22 PROCEDURE — 80048 BASIC METABOLIC PNL TOTAL CA: CPT

## 2024-06-22 PROCEDURE — 85025 COMPLETE CBC W/AUTO DIFF WBC: CPT

## 2024-06-22 PROCEDURE — 96361 HYDRATE IV INFUSION ADD-ON: CPT

## 2024-06-22 RX ORDER — HYDROCODONE BITARTRATE AND ACETAMINOPHEN 5; 325 MG/1; MG/1
1 TABLET ORAL
Status: COMPLETED | OUTPATIENT
Start: 2024-06-22 | End: 2024-06-22

## 2024-06-22 RX ORDER — 0.9 % SODIUM CHLORIDE 0.9 %
1000 INTRAVENOUS SOLUTION INTRAVENOUS ONCE
Status: COMPLETED | OUTPATIENT
Start: 2024-06-22 | End: 2024-06-22

## 2024-06-22 RX ADMIN — SODIUM CHLORIDE 1000 ML: 9 INJECTION, SOLUTION INTRAVENOUS at 13:03

## 2024-06-22 RX ADMIN — HYDROCODONE BITARTRATE AND ACETAMINOPHEN 1 TABLET: 5; 325 TABLET ORAL at 13:03

## 2024-06-22 ASSESSMENT — LIFESTYLE VARIABLES
HOW MANY STANDARD DRINKS CONTAINING ALCOHOL DO YOU HAVE ON A TYPICAL DAY: PATIENT DOES NOT DRINK
HOW OFTEN DO YOU HAVE A DRINK CONTAINING ALCOHOL: NEVER
HOW MANY STANDARD DRINKS CONTAINING ALCOHOL DO YOU HAVE ON A TYPICAL DAY: PATIENT DOES NOT DRINK
HOW OFTEN DO YOU HAVE A DRINK CONTAINING ALCOHOL: NEVER

## 2024-06-22 ASSESSMENT — PAIN DESCRIPTION - LOCATION
LOCATION: ABDOMEN
LOCATION: ABDOMEN

## 2024-06-22 ASSESSMENT — PAIN SCALES - GENERAL
PAINLEVEL_OUTOF10: 9
PAINLEVEL_OUTOF10: 9

## 2024-06-22 ASSESSMENT — PAIN DESCRIPTION - DESCRIPTORS
DESCRIPTORS: CRAMPING
DESCRIPTORS: ACHING;CRAMPING

## 2024-06-22 ASSESSMENT — PAIN - FUNCTIONAL ASSESSMENT: PAIN_FUNCTIONAL_ASSESSMENT: ACTIVITIES ARE NOT PREVENTED

## 2024-06-22 ASSESSMENT — PAIN DESCRIPTION - ORIENTATION: ORIENTATION: LOWER

## 2024-06-22 NOTE — ED PROVIDER NOTES
159/71    Pulse: 81    Resp: 16 18   Temp: 98.1 °F (36.7 °C)    TempSrc: Oral    SpO2: 95%    Weight: 83.1 kg (183 lb 3.2 oz)    Height: 1.74 m (5' 8.5\")         Patient was given the following medications:  Medications   sodium chloride 0.9 % bolus 1,000 mL (1,000 mLs IntraVENous New Bag 6/22/24 1303)   HYDROcodone-acetaminophen (NORCO) 5-325 MG per tablet 1 tablet (1 tablet Oral Given 6/22/24 1303)       Medical Decision Making  Amount and/or Complexity of Data Reviewed  Labs: ordered.  Radiology: ordered.    Risk  Prescription drug management.        Patient presents to the emergency department for eval ration of constipation.  Patient states that recently she been seen prior primary care physician and been placed on antibiotics secondary to a diarrheal illness.  She states that over the past 2 days she has not had any bowel movement.  She states she had taken Dulcolax yesterday without any relief.  She states that she was straining this morning to go to the bathroom without any success.  She states that she has lower abdominal pain rated 9 out of 10 also cramping abdominal pain.  She states these also had decreased urinary output.  She denies any fever or chills.  She denies any chest pain or shortness of breath.      Will obtain blood work to evaluate for anemia as well as electrolyte abnormality.  Patient be given IV fluids in addition to obtaining a KUB.    KUB shows a large amount of stool within the rectum as well as some stool within the left-sided colon.  Patient given a soapsuds enema.    Following the soapsuds enema patient had very large bowel movement and feeling better.  Patient be discharged home    FINAL IMPRESSION     1. Constipation, unspecified constipation type          DISPOSITION/PLAN   Loli Bazan's  results have been reviewed with her.  She has been counseled regarding her diagnosis, treatment, and plan.  She verbally conveys understanding and agreement of the signs, symptoms, diagnosis,

## 2024-06-24 ENCOUNTER — TELEPHONE (OUTPATIENT)
Age: 69
End: 2024-06-24

## 2024-06-24 NOTE — TELEPHONE ENCOUNTER
Trina April - just following up as it is getting closer. Patient due to have Botox on 7/3/24. Secondary Insurance is still pending approval as of 6/18/24. Any updates?

## 2024-06-27 ENCOUNTER — TELEPHONE (OUTPATIENT)
Age: 69
End: 2024-06-27

## 2024-06-27 NOTE — TELEPHONE ENCOUNTER
Hey April     Patient came in office today to sign the consent to file the appeal the Botox. I will put in quick scan to scan into media and mail back to [place].

## 2024-07-16 ENCOUNTER — TELEPHONE (OUTPATIENT)
Age: 69
End: 2024-07-16

## 2024-07-16 NOTE — TELEPHONE ENCOUNTER
Patient would like to know the status her her botox apptmt and if she can have a prescription for butalbital-aspirin-caffeine (FIORINAL) -40 MG capsule  sent to her [facility] pharmacy for her migraines. Pt can be reached at [phone]

## 2024-07-16 NOTE — TELEPHONE ENCOUNTER
This is an Trisha pt. I have sent a message to the PA specialist regarding any word on pt's appeal process.     Will send to Trisha and see if she will refill the Fiorinal.

## 2024-07-17 DIAGNOSIS — G43.709 CHRONIC MIGRAINE W/O AURA W/O STATUS MIGRAINOSUS, NOT INTRACTABLE: ICD-10-CM

## 2024-07-17 RX ORDER — BUTALBITAL, ASPIRIN, AND CAFFEINE 325; 50; 40 MG/1; MG/1; MG/1
CAPSULE ORAL
Qty: 10 CAPSULE | Refills: 1 | Status: SHIPPED | OUTPATIENT
Start: 2024-07-17 | End: 2025-01-05

## 2024-07-17 NOTE — TELEPHONE ENCOUNTER
Called pt,verified pt with two pt identifiers, advised pt that I have sent a message to our PA specialist yesterday regarding her Botox appeal. I have not heard anything back yet. I will let Marla LEROY know and she will reach out to PA specialist next week and call pt with update. I advised her I would call her back if I receive the update by end of week.     I advised that Trisha did send her Fiorinal to pharmacy. Verified it was sent today and correct pharmacy. Pt verbalized understanding and thanked me for call back.       Sending to Marla Diaz's nurse for update/scheduling of Botox next week.

## 2024-07-18 ENCOUNTER — OFFICE VISIT (OUTPATIENT)
Age: 69
End: 2024-07-18
Payer: MEDICARE

## 2024-07-18 VITALS
HEART RATE: 77 BPM | WEIGHT: 176 LBS | RESPIRATION RATE: 20 BRPM | TEMPERATURE: 97.3 F | OXYGEN SATURATION: 98 % | DIASTOLIC BLOOD PRESSURE: 62 MMHG | HEIGHT: 68 IN | BODY MASS INDEX: 26.67 KG/M2 | SYSTOLIC BLOOD PRESSURE: 106 MMHG

## 2024-07-18 DIAGNOSIS — Z12.11 ENCOUNTER FOR COLORECTAL CANCER SCREENING: ICD-10-CM

## 2024-07-18 DIAGNOSIS — F31.75 BIPOLAR DISORDER, IN PARTIAL REMISSION, MOST RECENT EPISODE DEPRESSED (HCC): ICD-10-CM

## 2024-07-18 DIAGNOSIS — F10.920 ACUTE ALCOHOLIC INTOXICATION WITHOUT COMPLICATION (HCC): ICD-10-CM

## 2024-07-18 DIAGNOSIS — E55.9 VITAMIN D DEFICIENCY: ICD-10-CM

## 2024-07-18 DIAGNOSIS — Z12.31 ENCOUNTER FOR SCREENING MAMMOGRAM FOR BREAST CANCER: ICD-10-CM

## 2024-07-18 DIAGNOSIS — F60.3 BORDERLINE PERSONALITY DISORDER (HCC): ICD-10-CM

## 2024-07-18 DIAGNOSIS — R06.02 SOB (SHORTNESS OF BREATH): ICD-10-CM

## 2024-07-18 DIAGNOSIS — E03.9 ACQUIRED HYPOTHYROIDISM: ICD-10-CM

## 2024-07-18 DIAGNOSIS — J20.9 ACUTE BRONCHITIS DUE TO INFECTION: Primary | ICD-10-CM

## 2024-07-18 DIAGNOSIS — E78.5 DYSLIPIDEMIA (HIGH LDL; LOW HDL): ICD-10-CM

## 2024-07-18 DIAGNOSIS — N18.31 STAGE 3A CHRONIC KIDNEY DISEASE (HCC): ICD-10-CM

## 2024-07-18 DIAGNOSIS — K58.0 IRRITABLE BOWEL SYNDROME WITH DIARRHEA: ICD-10-CM

## 2024-07-18 DIAGNOSIS — Z12.12 ENCOUNTER FOR COLORECTAL CANCER SCREENING: ICD-10-CM

## 2024-07-18 DIAGNOSIS — R19.7 DIARRHEA, UNSPECIFIED TYPE: ICD-10-CM

## 2024-07-18 PROCEDURE — 99214 OFFICE O/P EST MOD 30 MIN: CPT | Performed by: INTERNAL MEDICINE

## 2024-07-18 PROCEDURE — 1123F ACP DISCUSS/DSCN MKR DOCD: CPT | Performed by: INTERNAL MEDICINE

## 2024-07-18 RX ORDER — ALBUTEROL SULFATE 90 UG/1
2 AEROSOL, METERED RESPIRATORY (INHALATION) EVERY 6 HOURS PRN
Qty: 18 G | Refills: 3 | Status: SHIPPED | OUTPATIENT
Start: 2024-07-18

## 2024-07-18 NOTE — PROGRESS NOTES
Chief Complaint   Patient presents with    Follow-up     HPI:  Loli Bazan is a 69 y.o.  female with medical history below presents for follow-up.  Patient is doing well. Blood pressure is at goal. Patient has no new complaints.  She is requesting medication refill.    Review of Systems  As per hpi    Past Medical History:   Diagnosis Date    Anxiety disorder     Breast cancer (HCC)     right lumpectomy    Concussion     Depression     Fall 04/2021    Headache     Ill-defined condition     concussion March 2021 post fall    Ill-defined condition     migraine HA     Murmur     Psychotic disorder (HCC)     Radiation therapy complication     late 2011 or early 2012 for radiation    Thyroid disease     2005 thyroid no longer working post lithium med     Past Surgical History:   Procedure Laterality Date    ANKLE FRACTURE SURGERY      left surgical repair 2008    BREAST BIOPSY Left     benign 2011    BREAST LUMPECTOMY Right     2011    HEENT      tonsillectomy    HYSTERECTOMY (CERVIX STATUS UNKNOWN)      ORTHOPEDIC SURGERY       left meniscus repair x 2    OTHER SURGICAL HISTORY      barthalomew cyst rupture repair    ROTATOR CUFF REPAIR      right 2015     Social History     Socioeconomic History    Marital status:      Spouse name: None    Number of children: None    Years of education: None    Highest education level: None   Tobacco Use    Smoking status: Never    Smokeless tobacco: Never   Vaping Use    Vaping Use: Never used   Substance and Sexual Activity    Alcohol use: Yes     Alcohol/week: 10.0 standard drinks of alcohol     Types: 5 Glasses of wine, 5 Cans of beer per week    Drug use: Not Currently     Types: Cocaine    Sexual activity: Not Currently     Partners: Male     Social Determinants of Health     Financial Resource Strain: Low Risk  (8/31/2023)    Overall Financial Resource Strain (CARDIA)     Difficulty of Paying Living Expenses: Not hard at all   Transportation Needs: Unknown

## 2024-07-23 ENCOUNTER — TELEPHONE (OUTPATIENT)
Age: 69
End: 2024-07-23

## 2024-07-23 LAB
25(OH)D3 SERPL-MCNC: 34.5 NG/ML (ref 30–100)
ALBUMIN SERPL-MCNC: 4.2 G/DL (ref 3.5–5)
ALBUMIN/GLOB SERPL: 1.4 (ref 1.1–2.2)
ALP SERPL-CCNC: 98 U/L (ref 45–117)
ALT SERPL-CCNC: 28 U/L (ref 12–78)
ANION GAP SERPL CALC-SCNC: 8 MMOL/L (ref 5–15)
AST SERPL-CCNC: 20 U/L (ref 15–37)
BASOPHILS # BLD: 0 K/UL (ref 0–0.1)
BASOPHILS NFR BLD: 1 % (ref 0–1)
BILIRUB SERPL-MCNC: 0.2 MG/DL (ref 0.2–1)
BUN SERPL-MCNC: 16 MG/DL (ref 6–20)
BUN/CREAT SERPL: 21 (ref 12–20)
CALCIUM SERPL-MCNC: 9.9 MG/DL (ref 8.5–10.1)
CHLORIDE SERPL-SCNC: 104 MMOL/L (ref 97–108)
CHOLEST SERPL-MCNC: 205 MG/DL
CO2 SERPL-SCNC: 27 MMOL/L (ref 21–32)
CREAT SERPL-MCNC: 0.75 MG/DL (ref 0.55–1.02)
DIFFERENTIAL METHOD BLD: NORMAL
EOSINOPHIL # BLD: 0.1 K/UL (ref 0–0.4)
EOSINOPHIL NFR BLD: 2 % (ref 0–7)
ERYTHROCYTE [DISTWIDTH] IN BLOOD BY AUTOMATED COUNT: 12.1 % (ref 11.5–14.5)
FOLATE SERPL-MCNC: 11.7 NG/ML (ref 5–21)
GLOBULIN SER CALC-MCNC: 2.9 G/DL (ref 2–4)
GLUCOSE SERPL-MCNC: 87 MG/DL (ref 65–100)
HCT VFR BLD AUTO: 38.6 % (ref 35–47)
HDLC SERPL-MCNC: 61 MG/DL
HDLC SERPL: 3.4 (ref 0–5)
HGB BLD-MCNC: 12.8 G/DL (ref 11.5–16)
IMM GRANULOCYTES # BLD AUTO: 0 K/UL (ref 0–0.04)
IMM GRANULOCYTES NFR BLD AUTO: 0 % (ref 0–0.5)
LDLC SERPL CALC-MCNC: 102 MG/DL (ref 0–100)
LYMPHOCYTES # BLD: 2.3 K/UL (ref 0.8–3.5)
LYMPHOCYTES NFR BLD: 42 % (ref 12–49)
MCH RBC QN AUTO: 31.1 PG (ref 26–34)
MCHC RBC AUTO-ENTMCNC: 33.2 G/DL (ref 30–36.5)
MCV RBC AUTO: 93.7 FL (ref 80–99)
MONOCYTES # BLD: 0.5 K/UL (ref 0–1)
MONOCYTES NFR BLD: 8 % (ref 5–13)
NEUTS SEG # BLD: 2.5 K/UL (ref 1.8–8)
NEUTS SEG NFR BLD: 47 % (ref 32–75)
NRBC # BLD: 0 K/UL (ref 0–0.01)
NRBC BLD-RTO: 0 PER 100 WBC
PLATELET # BLD AUTO: 238 K/UL (ref 150–400)
PMV BLD AUTO: 10.7 FL (ref 8.9–12.9)
POTASSIUM SERPL-SCNC: 4.3 MMOL/L (ref 3.5–5.1)
PROT SERPL-MCNC: 7.1 G/DL (ref 6.4–8.2)
RBC # BLD AUTO: 4.12 M/UL (ref 3.8–5.2)
SODIUM SERPL-SCNC: 139 MMOL/L (ref 136–145)
TRIGL SERPL-MCNC: 210 MG/DL
TSH SERPL DL<=0.05 MIU/L-ACNC: 0.51 UIU/ML (ref 0.36–3.74)
VIT B12 SERPL-MCNC: 206 PG/ML (ref 193–986)
VLDLC SERPL CALC-MCNC: 42 MG/DL
WBC # BLD AUTO: 5.4 K/UL (ref 3.6–11)

## 2024-07-23 NOTE — TELEPHONE ENCOUNTER
Trina April    Patient is looking for an update on her Prior Auth for Botox. She was the one where the botox was covered but the procedure was not. Please advise

## 2024-07-23 NOTE — TELEPHONE ENCOUNTER
Sent message to Melanie Vargas through [employer] and E mail regarding status of PA. Awaiting response

## 2024-08-01 ENCOUNTER — OFFICE VISIT (OUTPATIENT)
Age: 69
End: 2024-08-01
Payer: MEDICARE

## 2024-08-01 VITALS
HEIGHT: 68 IN | WEIGHT: 176 LBS | TEMPERATURE: 98.7 F | OXYGEN SATURATION: 99 % | SYSTOLIC BLOOD PRESSURE: 133 MMHG | RESPIRATION RATE: 20 BRPM | BODY MASS INDEX: 26.67 KG/M2 | HEART RATE: 67 BPM | DIASTOLIC BLOOD PRESSURE: 72 MMHG

## 2024-08-01 DIAGNOSIS — E03.9 ACQUIRED HYPOTHYROIDISM: ICD-10-CM

## 2024-08-01 DIAGNOSIS — E78.2 MIXED HYPERLIPIDEMIA: Primary | ICD-10-CM

## 2024-08-01 DIAGNOSIS — I10 HYPERTENSION, WELL CONTROLLED: ICD-10-CM

## 2024-08-01 DIAGNOSIS — E55.9 VITAMIN D DEFICIENCY: ICD-10-CM

## 2024-08-01 DIAGNOSIS — G43.709 CHRONIC MIGRAINE W/O AURA W/O STATUS MIGRAINOSUS, NOT INTRACTABLE: ICD-10-CM

## 2024-08-01 PROCEDURE — 99214 OFFICE O/P EST MOD 30 MIN: CPT | Performed by: INTERNAL MEDICINE

## 2024-08-01 PROCEDURE — 3078F DIAST BP <80 MM HG: CPT | Performed by: INTERNAL MEDICINE

## 2024-08-01 PROCEDURE — 1123F ACP DISCUSS/DSCN MKR DOCD: CPT | Performed by: INTERNAL MEDICINE

## 2024-08-01 PROCEDURE — 3075F SYST BP GE 130 - 139MM HG: CPT | Performed by: INTERNAL MEDICINE

## 2024-08-01 RX ORDER — ROSUVASTATIN CALCIUM 10 MG/1
10 TABLET, COATED ORAL NIGHTLY
Qty: 30 TABLET | Refills: 3 | Status: SHIPPED | OUTPATIENT
Start: 2024-08-01

## 2024-08-01 RX ORDER — ACETAMINOPHEN 160 MG
1 TABLET,DISINTEGRATING ORAL DAILY
Qty: 90 CAPSULE | Refills: 1 | Status: SHIPPED | OUTPATIENT
Start: 2024-08-01

## 2024-08-01 NOTE — PROGRESS NOTES
Chief Complaint   Patient presents with    Results    Fatigue     HPI:   Loli Bazan is a 69 y.o. female presents for lab results review.   Total cholesterol, LDL and triglyceride levels are elevated.  Patient agreed to start treatment, diet and exercise.  Vit D level is at low normal level.    Review of Systems  As per hpi    Past Medical History:   Diagnosis Date    Anxiety disorder     Breast cancer (HCC)     right lumpectomy    Concussion     Depression     Fall 04/2021    Headache     Ill-defined condition     concussion March 2021 post fall    Ill-defined condition     migraine HA     Murmur     Psychotic disorder (HCC)     Radiation therapy complication     late 2011 or early 2012 for radiation    Thyroid disease     2005 thyroid no longer working post lithium med     Past Surgical History:   Procedure Laterality Date    ANKLE FRACTURE SURGERY      left surgical repair 2008    BREAST BIOPSY Left     benign 2011    BREAST LUMPECTOMY Right     2011    HEENT      tonsillectomy    HYSTERECTOMY (CERVIX STATUS UNKNOWN)      ORTHOPEDIC SURGERY       left meniscus repair x 2    OTHER SURGICAL HISTORY      barthalomew cyst rupture repair    ROTATOR CUFF REPAIR      right 2015     Social History     Socioeconomic History    Marital status:    Tobacco Use    Smoking status: Never    Smokeless tobacco: Never   Vaping Use    Vaping Use: Never used   Substance and Sexual Activity    Alcohol use: Yes     Alcohol/week: 10.0 standard drinks of alcohol     Types: 5 Glasses of wine, 5 Cans of beer per week    Drug use: Not Currently     Types: Cocaine    Sexual activity: Not Currently     Partners: Male     Social Determinants of Health     Financial Resource Strain: Low Risk  (8/31/2023)    Overall Financial Resource Strain (CARDIA)     Difficulty of Paying Living Expenses: Not hard at all   Transportation Needs: Unknown (8/31/2023)    PRAPARE - Transportation     Lack of Transportation (Non-Medical): No   Physical

## 2024-08-02 NOTE — TELEPHONE ENCOUNTER
Verified patient with 2 identifiers   Advised that we still have not heard back regarding botox appeal. Did advise that it could take up to 45 days for a decision. Patient verified understanding.

## 2024-08-13 ENCOUNTER — OFFICE VISIT (OUTPATIENT)
Age: 69
End: 2024-08-13
Payer: MEDICARE

## 2024-08-13 VITALS
BODY MASS INDEX: 26.67 KG/M2 | OXYGEN SATURATION: 98 % | HEIGHT: 68 IN | TEMPERATURE: 97 F | DIASTOLIC BLOOD PRESSURE: 64 MMHG | HEART RATE: 76 BPM | RESPIRATION RATE: 20 BRPM | SYSTOLIC BLOOD PRESSURE: 127 MMHG | WEIGHT: 176 LBS

## 2024-08-13 DIAGNOSIS — M62.830 SPASM OF MUSCLE, BACK: ICD-10-CM

## 2024-08-13 DIAGNOSIS — G89.29 CHRONIC RIGHT-SIDED LOW BACK PAIN WITHOUT SCIATICA: Primary | ICD-10-CM

## 2024-08-13 DIAGNOSIS — M54.50 CHRONIC RIGHT-SIDED LOW BACK PAIN WITHOUT SCIATICA: Primary | ICD-10-CM

## 2024-08-13 PROBLEM — S05.90XA INJURY OF EYE REGION: Status: ACTIVE | Noted: 2021-04-09

## 2024-08-13 PROBLEM — M47.812 CERVICAL SPONDYLOSIS: Status: ACTIVE | Noted: 2023-12-27

## 2024-08-13 PROBLEM — J10.1 INFLUENZA DUE TO INFLUENZA A VIRUS: Status: ACTIVE | Noted: 2022-12-16

## 2024-08-13 PROCEDURE — 1123F ACP DISCUSS/DSCN MKR DOCD: CPT | Performed by: INTERNAL MEDICINE

## 2024-08-13 PROCEDURE — 99213 OFFICE O/P EST LOW 20 MIN: CPT | Performed by: INTERNAL MEDICINE

## 2024-08-13 PROCEDURE — PBSHW PBB SHADOW CHARGE: Performed by: INTERNAL MEDICINE

## 2024-08-13 RX ORDER — ACETAMINOPHEN 500 MG
500 TABLET ORAL 4 TIMES DAILY PRN
Qty: 120 TABLET | Refills: 0 | Status: SHIPPED | OUTPATIENT
Start: 2024-08-13

## 2024-08-13 RX ORDER — PREDNISONE 20 MG/1
20 TABLET ORAL DAILY
Qty: 5 TABLET | Refills: 0 | Status: SHIPPED | OUTPATIENT
Start: 2024-08-14 | End: 2024-08-19

## 2024-08-13 RX ORDER — BACLOFEN 5 MG/1
10 TABLET ORAL 3 TIMES DAILY
Qty: 30 TABLET | Refills: 0 | Status: SHIPPED | OUTPATIENT
Start: 2024-08-13

## 2024-08-13 RX ORDER — TRIAMCINOLONE ACETONIDE 40 MG/ML
40 INJECTION, SUSPENSION INTRA-ARTICULAR; INTRAMUSCULAR ONCE
Status: COMPLETED | OUTPATIENT
Start: 2024-08-13 | End: 2024-08-13

## 2024-08-13 RX ADMIN — TRIAMCINOLONE ACETONIDE 40 MG: 40 INJECTION, SUSPENSION INTRA-ARTICULAR; INTRAMUSCULAR at 15:48

## 2024-08-13 NOTE — PROGRESS NOTES
After obtaining consent, and per orders of Dr. Arce  injection of tramicolon 4 mg  given in Right upper quad. gluteus by DANNIE LAMB LPN. Patient instructed to remain in clinic for 20 minutes afterwards, and to report adverse reaction to me immediately. Patient had no reaction to the injected medication.

## 2024-08-13 NOTE — PROGRESS NOTES
1. Have you been to the ER, urgent care clinic since your last visit?  Hospitalized since your last visit?No    2. Have you seen or consulted any other health care providers outside of the [facility] since your last visit?  Include any pap smears or colon screening. No

## 2024-08-13 NOTE — PROGRESS NOTES
Chief Complaint   Patient presents with    Back Pain     X 1 month  / OTC medication     Discuss Medications     HPI:  Loli Bazan is a 69 y.o.  female presents with worsening low back pain radiating down leg for a month.  OTC has medication has not helped.  Denies fall, injury, fever/chills.    Review of Systems  As per hpi    Past Medical History:   Diagnosis Date    Anxiety disorder     Breast cancer (HCC)     right lumpectomy    Concussion     Depression     Fall 04/2021    Headache     Ill-defined condition     concussion March 2021 post fall    Ill-defined condition     migraine HA     Murmur     Psychotic disorder (HCC)     Radiation therapy complication     late 2011 or early 2012 for radiation    Thyroid disease     2005 thyroid no longer working post lithium med     Past Surgical History:   Procedure Laterality Date    ANKLE FRACTURE SURGERY      left surgical repair 2008    BREAST BIOPSY Left     benign 2011    BREAST LUMPECTOMY Right     2011    HEENT      tonsillectomy    HYSTERECTOMY (CERVIX STATUS UNKNOWN)      ORTHOPEDIC SURGERY       left meniscus repair x 2    OTHER SURGICAL HISTORY      barthalomew cyst rupture repair    ROTATOR CUFF REPAIR      right 2015     Social History     Socioeconomic History    Marital status:    Tobacco Use    Smoking status: Never    Smokeless tobacco: Never   Vaping Use    Vaping status: Never Used   Substance and Sexual Activity    Alcohol use: Yes     Alcohol/week: 10.0 standard drinks of alcohol     Types: 5 Glasses of wine, 5 Cans of beer per week    Drug use: Not Currently     Types: Cocaine    Sexual activity: Not Currently     Partners: Male     Social Determinants of Health     Financial Resource Strain: Low Risk  (8/31/2023)    Overall Financial Resource Strain (CARDIA)     Difficulty of Paying Living Expenses: Not hard at all   Transportation Needs: Unknown (8/31/2023)    PRAPARE - Transportation     Lack of Transportation (Non-Medical): No

## 2024-08-19 ENCOUNTER — TELEPHONE (OUTPATIENT)
Age: 69
End: 2024-08-19

## 2024-08-19 NOTE — TELEPHONE ENCOUNTER
Spoke with patient. She said that she had a steroid injection a week ago. Now feeling weak, dizzy trouble standing. Instructed patient to go to ER for eval. Patient repeated back and said that she would go.

## 2024-08-22 NOTE — TELEPHONE ENCOUNTER
Per Little Pdailla,  Looks like Ms. Bazan has been approved - I will defer to April to update the note with the approval range, as I can only see that it has been approved in Availity at this time.     I cannot addend Aprils note to make the update, she will have to do that.  But she is good for injection!     Verified patient with 2 identifiers   Advised the botox has been approved and that we can get her scheduled. Scheduled for 9/4/24 at 2 pm

## 2024-08-27 ENCOUNTER — HOSPITAL ENCOUNTER (OUTPATIENT)
Facility: HOSPITAL | Age: 69
Discharge: HOME | End: 2024-08-30
Attending: INTERNAL MEDICINE
Payer: MEDICARE

## 2024-08-27 VITALS — BODY MASS INDEX: 26.67 KG/M2 | HEIGHT: 68 IN | WEIGHT: 176 LBS

## 2024-08-27 DIAGNOSIS — Z12.31 ENCOUNTER FOR SCREENING MAMMOGRAM FOR BREAST CANCER: ICD-10-CM

## 2024-08-27 PROCEDURE — 77067 SCR MAMMO BI INCL CAD: CPT

## 2024-08-27 RX ORDER — LEVOTHYROXINE SODIUM 150 UG/1
TABLET ORAL
Qty: 30 TABLET | Refills: 1 | Status: SHIPPED | OUTPATIENT
Start: 2024-08-27

## 2024-09-10 ENCOUNTER — TELEPHONE (OUTPATIENT)
Age: 69
End: 2024-09-10

## 2024-09-13 ENCOUNTER — TELEPHONE (OUTPATIENT)
Age: 69
End: 2024-09-13

## 2024-09-13 DIAGNOSIS — G43.709 CHRONIC MIGRAINE W/O AURA W/O STATUS MIGRAINOSUS, NOT INTRACTABLE: ICD-10-CM

## 2024-09-13 RX ORDER — BUTALBITAL, ASPIRIN, AND CAFFEINE 325; 50; 40 MG/1; MG/1; MG/1
CAPSULE ORAL
Qty: 10 CAPSULE | Refills: 1 | Status: SHIPPED | OUTPATIENT
Start: 2024-09-13 | End: 2025-03-04

## 2024-10-08 ENCOUNTER — TELEPHONE (OUTPATIENT)
Age: 69
End: 2024-10-08

## 2024-10-08 NOTE — TELEPHONE ENCOUNTER
Pt called.  Verified patient with two patient identifiers.    Had concussion 1 wk ago,. Now clammy, sweaty, nauseated  States she nit her head on bricks really hard.  Similar symptoms as with concussion.  Advised to go to ER for eval and possible treatment.  Advised to keep us posted.    Patient verbalized understanding.

## 2024-10-14 RX ORDER — LEVOTHYROXINE SODIUM 150 UG/1
TABLET ORAL
Qty: 30 TABLET | Refills: 0 | OUTPATIENT
Start: 2024-10-14

## 2024-10-23 DIAGNOSIS — G43.709 CHRONIC MIGRAINE W/O AURA W/O STATUS MIGRAINOSUS, NOT INTRACTABLE: ICD-10-CM

## 2024-10-24 NOTE — TELEPHONE ENCOUNTER
Patient is calling about her butalbital. She states she only has one pill left for her migraines. She would like it sent to [facility] on [address]. Thank you.

## 2024-10-25 RX ORDER — BUTALBITAL, ASPIRIN, AND CAFFEINE 325; 50; 40 MG/1; MG/1; MG/1
CAPSULE ORAL
Qty: 10 CAPSULE | Refills: 0 | Status: SHIPPED | OUTPATIENT
Start: 2024-10-25 | End: 2024-11-20

## 2024-10-25 NOTE — TELEPHONE ENCOUNTER
I just see Procedure visits in her chart. Not an actual OV   Last filled on 9/13/24 # 10 with 1 refill

## 2024-10-27 DIAGNOSIS — G43.709 CHRONIC MIGRAINE W/O AURA W/O STATUS MIGRAINOSUS, NOT INTRACTABLE: ICD-10-CM

## 2024-10-28 RX ORDER — LEVOTHYROXINE SODIUM 150 UG/1
TABLET ORAL
Qty: 30 TABLET | Refills: 0 | OUTPATIENT
Start: 2024-10-28

## 2024-10-28 RX ORDER — BUTALBITAL, ASPIRIN, AND CAFFEINE 325; 50; 40 MG/1; MG/1; MG/1
CAPSULE ORAL
Qty: 10 CAPSULE | Refills: 0 | OUTPATIENT
Start: 2024-10-28

## 2024-10-29 RX ORDER — LEVOTHYROXINE SODIUM 150 UG/1
TABLET ORAL
Qty: 30 TABLET | Refills: 0 | OUTPATIENT
Start: 2024-10-29

## 2024-10-31 RX ORDER — LEVOTHYROXINE SODIUM 150 UG/1
150 TABLET ORAL
Qty: 30 TABLET | Refills: 0 | Status: SHIPPED | OUTPATIENT
Start: 2024-10-31

## 2024-11-01 ENCOUNTER — OFFICE VISIT (OUTPATIENT)
Age: 69
End: 2024-11-01

## 2024-11-01 VITALS
WEIGHT: 181.6 LBS | DIASTOLIC BLOOD PRESSURE: 72 MMHG | HEART RATE: 71 BPM | SYSTOLIC BLOOD PRESSURE: 112 MMHG | HEIGHT: 68 IN | BODY MASS INDEX: 27.52 KG/M2

## 2024-11-01 DIAGNOSIS — E55.9 VITAMIN D DEFICIENCY, UNSPECIFIED: ICD-10-CM

## 2024-11-01 DIAGNOSIS — E03.9 ACQUIRED HYPOTHYROIDISM: Primary | ICD-10-CM

## 2024-11-01 RX ORDER — TRAZODONE HYDROCHLORIDE 100 MG/1
300 TABLET ORAL NIGHTLY
COMMUNITY
Start: 2024-10-08

## 2024-11-01 NOTE — PROGRESS NOTES
Chief Complaint   Patient presents with    Thyroid Problem     Pcp and pharmacy verified     History of Present Illness: Loli Bazan is a 69 y.o. female here for follow up of hypothyroidism.  \"I had been taking lithium in the past, for bipolar d/0 and it killed my thyroid.\"  At our initial visit in April 2021 her TSH was 7.5 so I increased her LT4 to 150mcg every day. Pt instructed to take her LT4 by itself, on an empty stomach with a full glass of water and wait 30 minutes before she takes any other medications or eats.    Pt has not been back to see me since March 2023.    \"I have been falling a lot, I had spinal surgery in January 2024 and now I am on my 4th round of epidural injections and they do not seem to help.\"    Pt is taking the LT4 150mcg every day. She takes this first thing in the morning, on an empty stomach, with a full glass of water by itself and waits 30-60 minutes before she eats.    She is taking vitamin D 5,000 units \"when I think about it.\"    She is still  seeing Dr. Tex Leach of Neurology.       She denies issues of palpitations, or CP, SOB, tremors, heat or cold intolerance, diarrhea or constipation.  She denies issues of dysphagia, dysphonia or chocking issues.    Since I have not been as physically active I have gained weight and I need labs drawn.  Her weight today was 181 pounds. \"I went on a low carb diet and I would like to lose another 10-15 pounds. (Her weight in March 2023 was 210 pounds).      Current Outpatient Medications   Medication Sig    traZODone (DESYREL) 100 MG tablet Take 3 tablets by mouth nightly    levothyroxine (SYNTHROID) 150 MCG tablet Take 1 tablet by mouth every morning (before breakfast)    butalbital-aspirin-caffeine (FIORINAL) -40 MG capsule TAKE 1 CAPSULE BY MOUTH EVERY 6 HOURS AS NEEDED FOR MIGRAINE HEADACHE    acetaminophen (TYLENOL) 500 MG tablet Take 1 tablet by mouth 4 times daily as needed for Pain    rosuvastatin (CRESTOR) 10 MG tablet Take

## 2024-11-04 ENCOUNTER — TELEPHONE (OUTPATIENT)
Age: 69
End: 2024-11-04

## 2024-11-04 NOTE — TELEPHONE ENCOUNTER
Patient is scheduled for botox on Wed, 11/6. She is asking to reschedule. Please call her back at . Thank you.

## 2024-11-13 ENCOUNTER — TELEMEDICINE (OUTPATIENT)
Age: 69
End: 2024-11-13

## 2024-11-13 DIAGNOSIS — J20.8 ACUTE BRONCHITIS DUE TO COVID-19 VIRUS: Primary | ICD-10-CM

## 2024-11-13 DIAGNOSIS — U07.1 ACUTE BRONCHITIS DUE TO COVID-19 VIRUS: Primary | ICD-10-CM

## 2024-11-13 PROBLEM — S00.83XA CONTUSION OF FOREHEAD: Status: ACTIVE | Noted: 2024-04-17

## 2024-11-13 PROBLEM — S06.0XAA CONCUSSION INJURY OF BODY STRUCTURE: Status: ACTIVE | Noted: 2019-08-11

## 2024-11-13 RX ORDER — DEXTROMETHORPHAN HYDROBROMIDE AND PROMETHAZINE HYDROCHLORIDE 15; 6.25 MG/5ML; MG/5ML
5 SYRUP ORAL 4 TIMES DAILY PRN
Qty: 240 ML | Refills: 0 | Status: SHIPPED | OUTPATIENT
Start: 2024-11-13 | End: 2024-11-25

## 2024-11-13 RX ORDER — AZITHROMYCIN 250 MG/1
TABLET, FILM COATED ORAL
Qty: 6 TABLET | Refills: 0 | Status: SHIPPED | OUTPATIENT
Start: 2024-11-13 | End: 2024-11-23

## 2024-11-13 ASSESSMENT — PATIENT HEALTH QUESTIONNAIRE - PHQ9
SUM OF ALL RESPONSES TO PHQ QUESTIONS 1-9: 0
SUM OF ALL RESPONSES TO PHQ9 QUESTIONS 1 & 2: 0
1. LITTLE INTEREST OR PLEASURE IN DOING THINGS: NOT AT ALL
2. FEELING DOWN, DEPRESSED OR HOPELESS: NOT AT ALL

## 2024-11-13 NOTE — PROGRESS NOTES
Loli Bazan, was evaluated through a synchronous (real-time) audio-video encounter. The patient (or guardian if applicable) is aware that this is a billable service, which includes applicable co-pays. This Virtual Visit was conducted with patient's (and/or legal guardian's) consent. Patient identification was verified, and a caregiver was present when appropriate.   The patient was located at Home: [address] [place] [id number]  Provider was located at Facility (Appt Dept): [address] [facility] [address]  [place] [address]  Confirm you are appropriately licensed, registered, or certified to deliver care in the [facility] where the patient is located as indicated above. If you are not or unsure, please re-schedule the visit: Yes, I confirm.     Loli Bazan (:  1955) is a Established patient, presenting virtually for evaluation of the following:      Below is the assessment and plan developed based on review of pertinent history, physical exam, labs, studies, and medications.     Assessment & Plan  Acute bronchitis due to COVID-19 virus   Orders:    azithromycin (ZITHROMAX) 250 MG tablet; 500mg on day 1 followed by 250mg on days 2 - 5    promethazine-dextromethorphan (PROMETHAZINE-DM) 6.25-15 MG/5ML syrup; Take 5 mLs by mouth 4 times daily as needed for Cough    nirmatrelvir/ritonavir 300/100 (PAXLOVID) 20 x 150 MG & 10 x 100MG TBPK; Take 3 tablets (two 150 mg nirmatrelvir and one 100 mg ritonavir tablets) by mouth every 12 hours for 5 days.      Return if symptoms worsen or fail to improve.       Subjective   Loli Bazan is a 69 y.o. female is seen for positive Covid test with productive cough, low grade fever ongoing for 3 days. Denies wheezing, sob      Review of Systems   As per hpi    Objective   Patient-Reported Vitals  Patient-Reported Temperature: 100.5  Patient-Reported Weight: 181lb  Patient-Reported Height: 5.8       Physical Exam  [INSTRUCTIONS:  \"[x]\" Indicates a positive

## 2024-11-25 ENCOUNTER — TELEPHONE (OUTPATIENT)
Age: 69
End: 2024-11-25

## 2024-11-25 DIAGNOSIS — G43.709 CHRONIC MIGRAINE W/O AURA W/O STATUS MIGRAINOSUS, NOT INTRACTABLE: ICD-10-CM

## 2024-11-25 RX ORDER — BUTALBITAL, ASPIRIN, AND CAFFEINE 325; 50; 40 MG/1; MG/1; MG/1
CAPSULE ORAL
Qty: 10 CAPSULE | Refills: 0 | Status: SHIPPED | OUTPATIENT
Start: 2024-11-25 | End: 2024-12-24

## 2024-11-25 NOTE — TELEPHONE ENCOUNTER
Botox Drug Acquisition: BUY AND BILL  POS 11 Druu btx  Dx: G43.709  Insurance: [facility] Medicare  Submission Type: [facility] Portal  Lists of hospitals in the [place]:   CPT: 31246 ([place] not required)  Reference #: [id number]  Approval Range: 24-25

## 2024-11-26 RX ORDER — LEVOTHYROXINE SODIUM 150 UG/1
TABLET ORAL
Qty: 90 TABLET | Refills: 1 | Status: SHIPPED | OUTPATIENT
Start: 2024-11-26

## 2024-11-30 DIAGNOSIS — E78.2 MIXED HYPERLIPIDEMIA: ICD-10-CM

## 2024-12-02 NOTE — TELEPHONE ENCOUNTER
Last appointment: 11/13/24  Next appointment: none  Previous refill encounter(s): 8/1/24 #30 with 3 refills    Requested Prescriptions     Pending Prescriptions Disp Refills    rosuvastatin (CRESTOR) 10 MG tablet [Pharmacy Med Name: Rosuvastatin Calcium 10 MG Oral Tablet] 30 tablet 0     Sig: Take 1 tablet by mouth nightly         For Pharmacy Admin Tracking Only    Program: Medication Refill  CPA in place:    Recommendation Provided To:   Intervention Detail: New Rx: 1, reason: Patient Preference  Intervention Accepted By:   Gap Closed?:    Time Spent (min): 5

## 2024-12-03 RX ORDER — ROSUVASTATIN CALCIUM 10 MG/1
10 TABLET, COATED ORAL NIGHTLY
Qty: 30 TABLET | Refills: 0 | Status: SHIPPED | OUTPATIENT
Start: 2024-12-03

## 2024-12-04 ENCOUNTER — OFFICE VISIT (OUTPATIENT)
Age: 69
End: 2024-12-04
Payer: MEDICARE

## 2024-12-04 DIAGNOSIS — G43.719 CHRONIC MIGRAINE WITHOUT AURA, INTRACTABLE, WITHOUT STATUS MIGRAINOSUS: Primary | ICD-10-CM

## 2024-12-04 DIAGNOSIS — Z92.29 S/P BOTOX INJECTION: ICD-10-CM

## 2024-12-04 PROCEDURE — 64615 CHEMODENERV MUSC MIGRAINE: CPT | Performed by: NURSE PRACTITIONER

## 2024-12-04 NOTE — PROGRESS NOTES
Botox Injection Note       ICD-10-CM    1. Chronic migraine without aura, intractable, without status migrainosus  G43.719 Onabotulinumtoxin A (BOTOX) injection 200 Units      2. S/P Botox injection  Z92.29            Indication: patient has chronic recurrent migraine.      Procedure:   Botox concentration: 200 units in 4 ml of preservative-free normal saline.       31 sites injections, distribution as follow      Units/site  Sites Sides Subtotal    Procerus 5 1 1 5    5 1 2 10   Frontalis 5 2 2 20   Temporalis 5 4 2 40   Occipitalis 5 3 2 30   Upper cervical paraspinalis 5 2 2 20   Trapezius 5 3 2 30         200 units Botox were reconstituted, 155 units injected as above and the remainder was unavoidably wasted.     Patient tolerated procedure well.     With Treatment: current relief is: 12/month  Prior to Botox:  15+/month    Preventative medications previously tried              Botox   Topamax              Trazodone              Propranolol              Patient is under the care of mental health and is presently on Viibryd which prevents her from using any other form of antidepressant medication     Rescue medications tried              Fiorinal              Maxalt              Imitrex              Amerge    _____________________________   partha Boswell APRN - NP

## 2024-12-09 ENCOUNTER — TELEPHONE (OUTPATIENT)
Age: 69
End: 2024-12-09

## 2024-12-09 NOTE — TELEPHONE ENCOUNTER
Patient reports feeling chills, dizziness and sweats for last 2 weeks. She denied any other symptoms. Advised her to go to an urgent care or ER ASAP. Advised that she do not drive herself anywhere. Explained that she can follow-up with Dr. Arce after she is evaluated.

## 2024-12-16 ENCOUNTER — TELEPHONE (OUTPATIENT)
Age: 69
End: 2024-12-16

## 2024-12-16 NOTE — TELEPHONE ENCOUNTER
Patient called regarding an eye injury. State she fell getting out of tub and hit her eye. Suggest her to go to ER to be triage completely. Patient have someone to transport her to the ER.

## 2024-12-17 ENCOUNTER — TELEPHONE (OUTPATIENT)
Age: 69
End: 2024-12-17

## 2024-12-17 NOTE — TELEPHONE ENCOUNTER
Patient called, stating she went to [facility] yesterday    They did a CT scan  this was fantastic    Her labs showed low potassium,  they gave her 2 potassium pills    She has a bladder infection and they have sent in an antibiotic for her    And they gave her pain medication for her eye    Please call patient [phone]

## 2024-12-17 NOTE — TELEPHONE ENCOUNTER
ECC Message to Provider  Received: Today  Lona Jones [facility] [address] Clinical Staff  ECC Message to Provider    Relationship to Patient: Self    Additional Information Patient wants to send a message to  Nurse Omaira from doctor Claude's office. Patient wants the nurse to know that she went to the ER last night and they did a CAT SCAN and there's nothing wrong. She was given anti-biotics and pain medication.  --------------------------------------------------------------------------------------------------------------------------    Call Back Information: OK to leave message on voicemail  Preferred Call Back Number: Phone [phone]

## 2024-12-31 DIAGNOSIS — E78.2 MIXED HYPERLIPIDEMIA: ICD-10-CM

## 2024-12-31 RX ORDER — ROSUVASTATIN CALCIUM 10 MG/1
10 TABLET, COATED ORAL NIGHTLY
Qty: 30 TABLET | Refills: 3 | Status: SHIPPED | OUTPATIENT
Start: 2024-12-31

## 2025-01-15 DIAGNOSIS — G43.709 CHRONIC MIGRAINE W/O AURA W/O STATUS MIGRAINOSUS, NOT INTRACTABLE: ICD-10-CM

## 2025-01-15 RX ORDER — BUTALBITAL, ASPIRIN, AND CAFFEINE 325; 50; 40 MG/1; MG/1; MG/1
CAPSULE ORAL
Qty: 10 CAPSULE | Refills: 0 | Status: SHIPPED | OUTPATIENT
Start: 2025-01-15 | End: 2025-02-13

## 2025-01-15 NOTE — TELEPHONE ENCOUNTER
Patient is asking if she can get a refill of butalbital for her migraines. She would like it sent to [facility] on [address] Thank you.

## 2025-02-06 ENCOUNTER — TELEPHONE (OUTPATIENT)
Age: 70
End: 2025-02-06

## 2025-02-10 ENCOUNTER — TELEPHONE (OUTPATIENT)
Age: 70
End: 2025-02-10

## 2025-02-10 DIAGNOSIS — G43.709 CHRONIC MIGRAINE W/O AURA W/O STATUS MIGRAINOSUS, NOT INTRACTABLE: ICD-10-CM

## 2025-02-10 RX ORDER — BUTALBITAL, ASPIRIN, AND CAFFEINE 325; 50; 40 MG/1; MG/1; MG/1
CAPSULE ORAL
Qty: 10 CAPSULE | Refills: 0 | Status: SHIPPED | OUTPATIENT
Start: 2025-02-10 | End: 2025-03-11

## 2025-02-10 NOTE — TELEPHONE ENCOUNTER
Patient needs to reschedule her botox appt that was on March 5th. She will be in [place]. Please call her back at  to get that rescheduled. Thank you.

## 2025-02-10 NOTE — TELEPHONE ENCOUNTER
Patient is asking for a refill of butalbital. She only has two days left. She would like it sent to [facility] on [address], [place]. Thank you.

## 2025-02-12 DIAGNOSIS — G89.29 CHRONIC RIGHT-SIDED LOW BACK PAIN WITHOUT SCIATICA: ICD-10-CM

## 2025-02-12 DIAGNOSIS — M62.830 SPASM OF MUSCLE, BACK: ICD-10-CM

## 2025-02-12 DIAGNOSIS — M54.50 CHRONIC RIGHT-SIDED LOW BACK PAIN WITHOUT SCIATICA: ICD-10-CM

## 2025-02-12 NOTE — TELEPHONE ENCOUNTER
Last appointment: 11/13/24  Next appointment: 3/4/25  Previous refill encounter(s): 8/13/24 #30    Requested Prescriptions     Pending Prescriptions Disp Refills    Baclofen (LIORESAL) 5 MG tablet [Pharmacy Med Name: Baclofen 5 MG Oral Tablet] 30 tablet 0     Sig: TAKE 2 TABLETS BY MOUTH THREE TIMES DAILY         For Pharmacy Admin Tracking Only    Program: Medication Refill  CPA in place:    Recommendation Provided To:   Intervention Detail: New Rx: 1, reason: Patient Preference  Intervention Accepted By:   Gap Closed?:    Time Spent (min): 5

## 2025-02-14 RX ORDER — BACLOFEN 5 MG/1
10 TABLET ORAL 3 TIMES DAILY
Qty: 30 TABLET | Refills: 2 | Status: SHIPPED | OUTPATIENT
Start: 2025-02-14

## 2025-03-17 DIAGNOSIS — G43.709 CHRONIC MIGRAINE W/O AURA W/O STATUS MIGRAINOSUS, NOT INTRACTABLE: ICD-10-CM

## 2025-03-17 NOTE — TELEPHONE ENCOUNTER
HIPAA Verified (if caller is someone other than patient): no       Reason for Call:   Medication refill  Medication Name:   butalbital-aspirin-caffeine (FIORINAL) -40 MG capsule     Pharmacy (if different from pharmacy on file):   [address] - P [phone] - F [phone]    Prescribing Provider:   Andreia    Last Office Visit (must be within last 12 months - if not, schedule appointment then send refill encounter):   2/8/2023     Is patient completely out of medication?   NA        Message: (any additional details from patient/caller not covered above)  Pt called to request refills for butalbital. She can be reached at [phone]

## 2025-03-18 RX ORDER — BUTALBITAL, ASPIRIN, AND CAFFEINE 325; 50; 40 MG/1; MG/1; MG/1
CAPSULE ORAL
Qty: 10 CAPSULE | Refills: 0 | Status: SHIPPED | OUTPATIENT
Start: 2025-03-18 | End: 2025-04-15

## 2025-03-27 ENCOUNTER — OFFICE VISIT (OUTPATIENT)
Age: 70
End: 2025-03-27
Payer: MEDICARE

## 2025-03-27 VITALS
BODY MASS INDEX: 26.64 KG/M2 | TEMPERATURE: 96.6 F | HEIGHT: 68 IN | DIASTOLIC BLOOD PRESSURE: 78 MMHG | RESPIRATION RATE: 18 BRPM | SYSTOLIC BLOOD PRESSURE: 136 MMHG | WEIGHT: 175.8 LBS | OXYGEN SATURATION: 95 % | HEART RATE: 67 BPM

## 2025-03-27 DIAGNOSIS — M62.838 MUSCLE SPASM OF SHOULDER REGION: ICD-10-CM

## 2025-03-27 DIAGNOSIS — I10 HYPERTENSION, WELL CONTROLLED: ICD-10-CM

## 2025-03-27 DIAGNOSIS — N18.31 STAGE 3A CHRONIC KIDNEY DISEASE (HCC): ICD-10-CM

## 2025-03-27 DIAGNOSIS — R73.03 BORDERLINE TYPE 2 DIABETES MELLITUS: ICD-10-CM

## 2025-03-27 DIAGNOSIS — E78.5 DYSLIPIDEMIA (HIGH LDL; LOW HDL): ICD-10-CM

## 2025-03-27 DIAGNOSIS — Z91.81 AT HIGH RISK FOR FALLS: ICD-10-CM

## 2025-03-27 DIAGNOSIS — N30.00 ACUTE CYSTITIS WITHOUT HEMATURIA: ICD-10-CM

## 2025-03-27 DIAGNOSIS — E03.9 ACQUIRED HYPOTHYROIDISM: ICD-10-CM

## 2025-03-27 DIAGNOSIS — E55.9 VITAMIN D DEFICIENCY: ICD-10-CM

## 2025-03-27 DIAGNOSIS — Z00.00 MEDICARE ANNUAL WELLNESS VISIT, SUBSEQUENT: ICD-10-CM

## 2025-03-27 DIAGNOSIS — F90.9 ATTENTION DEFICIT HYPERACTIVITY DISORDER (ADHD), UNSPECIFIED ADHD TYPE: ICD-10-CM

## 2025-03-27 DIAGNOSIS — F60.3 BORDERLINE PERSONALITY DISORDER (HCC): ICD-10-CM

## 2025-03-27 DIAGNOSIS — Z00.00 MEDICARE ANNUAL WELLNESS VISIT, SUBSEQUENT: Primary | ICD-10-CM

## 2025-03-27 PROCEDURE — 3078F DIAST BP <80 MM HG: CPT | Performed by: INTERNAL MEDICINE

## 2025-03-27 PROCEDURE — 1125F AMNT PAIN NOTED PAIN PRSNT: CPT | Performed by: INTERNAL MEDICINE

## 2025-03-27 PROCEDURE — 1159F MED LIST DOCD IN RCRD: CPT | Performed by: INTERNAL MEDICINE

## 2025-03-27 PROCEDURE — 1123F ACP DISCUSS/DSCN MKR DOCD: CPT | Performed by: INTERNAL MEDICINE

## 2025-03-27 PROCEDURE — G0439 PPPS, SUBSEQ VISIT: HCPCS | Performed by: INTERNAL MEDICINE

## 2025-03-27 PROCEDURE — 3017F COLORECTAL CA SCREEN DOC REV: CPT | Performed by: INTERNAL MEDICINE

## 2025-03-27 PROCEDURE — 1160F RVW MEDS BY RX/DR IN RCRD: CPT | Performed by: INTERNAL MEDICINE

## 2025-03-27 PROCEDURE — 3075F SYST BP GE 130 - 139MM HG: CPT | Performed by: INTERNAL MEDICINE

## 2025-03-27 RX ORDER — PHENTERMINE HYDROCHLORIDE 37.5 MG/1
TABLET ORAL
COMMUNITY
Start: 2025-02-09

## 2025-03-27 RX ORDER — ATOMOXETINE 25 MG/1
25 CAPSULE ORAL DAILY
COMMUNITY
Start: 2025-03-16

## 2025-03-27 RX ORDER — BACLOFEN 10 MG/1
10 TABLET ORAL 3 TIMES DAILY
COMMUNITY
Start: 2025-02-21

## 2025-03-27 RX ORDER — BRIMONIDINE TARTRATE 2 MG/ML
SOLUTION/ DROPS OPHTHALMIC
COMMUNITY
Start: 2025-03-26

## 2025-03-27 SDOH — ECONOMIC STABILITY: FOOD INSECURITY: WITHIN THE PAST 12 MONTHS, YOU WORRIED THAT YOUR FOOD WOULD RUN OUT BEFORE YOU GOT MONEY TO BUY MORE.: NEVER TRUE

## 2025-03-27 SDOH — ECONOMIC STABILITY: FOOD INSECURITY: WITHIN THE PAST 12 MONTHS, THE FOOD YOU BOUGHT JUST DIDN'T LAST AND YOU DIDN'T HAVE MONEY TO GET MORE.: NEVER TRUE

## 2025-03-27 ASSESSMENT — LIFESTYLE VARIABLES
HOW MANY STANDARD DRINKS CONTAINING ALCOHOL DO YOU HAVE ON A TYPICAL DAY: 1 OR 2
HOW OFTEN DO YOU HAVE A DRINK CONTAINING ALCOHOL: 2-3 TIMES A WEEK

## 2025-03-27 ASSESSMENT — PATIENT HEALTH QUESTIONNAIRE - PHQ9
7. TROUBLE CONCENTRATING ON THINGS, SUCH AS READING THE NEWSPAPER OR WATCHING TELEVISION: NEARLY EVERY DAY
6. FEELING BAD ABOUT YOURSELF - OR THAT YOU ARE A FAILURE OR HAVE LET YOURSELF OR YOUR FAMILY DOWN: NOT AT ALL
SUM OF ALL RESPONSES TO PHQ QUESTIONS 1-9: 17
9. THOUGHTS THAT YOU WOULD BE BETTER OFF DEAD, OR OF HURTING YOURSELF: NOT AT ALL
8. MOVING OR SPEAKING SO SLOWLY THAT OTHER PEOPLE COULD HAVE NOTICED. OR THE OPPOSITE, BEING SO FIGETY OR RESTLESS THAT YOU HAVE BEEN MOVING AROUND A LOT MORE THAN USUAL: NEARLY EVERY DAY
1. LITTLE INTEREST OR PLEASURE IN DOING THINGS: NEARLY EVERY DAY
4. FEELING TIRED OR HAVING LITTLE ENERGY: NEARLY EVERY DAY
2. FEELING DOWN, DEPRESSED OR HOPELESS: NEARLY EVERY DAY
5. POOR APPETITE OR OVEREATING: NOT AT ALL
10. IF YOU CHECKED OFF ANY PROBLEMS, HOW DIFFICULT HAVE THESE PROBLEMS MADE IT FOR YOU TO DO YOUR WORK, TAKE CARE OF THINGS AT HOME, OR GET ALONG WITH OTHER PEOPLE: VERY DIFFICULT
3. TROUBLE FALLING OR STAYING ASLEEP: MORE THAN HALF THE DAYS
SUM OF ALL RESPONSES TO PHQ QUESTIONS 1-9: 17

## 2025-03-27 NOTE — PROGRESS NOTES
Medicare Annual Wellness Visit    Loli Bazan is here for Medicare AWV, Fatigue (Also hot all the time), and Fall (About 8 mo ago)    Assessment & Plan   Assessment & Plan  Medicare annual wellness visit, subsequent  Orders:    Comprehensive Metabolic Panel; Future    CBC with Auto Differential; Future    Acquired hypothyroidism  Orders:    TSH; Future    Attention deficit hyperactivity disorder (ADHD), unspecified ADHD type  Orders:    Comprehensive Metabolic Panel; Future    CBC with Auto Differential; Future    Borderline personality disorder (HCC)  Orders:    Comprehensive Metabolic Panel; Future    CBC with Auto Differential; Future    Hypertension, well controlled  Orders:    Comprehensive Metabolic Panel; Future    CBC with Auto Differential; Future    Vitamin D deficiency  Orders:    Vitamin D 25 Hydroxy; Future    Dyslipidemia (high LDL; low HDL)  Orders:    Lipid Panel; Future    Muscle spasm of shoulder region  Orders:    Comprehensive Metabolic Panel; Future    CBC with Auto Differential; Future    Borderline type 2 diabetes mellitus  Orders:    Hemoglobin A1C; Future    Acute cystitis without hematuria  Orders:    Urinalysis with Reflex to Culture; Future    At high risk for falls   Monitored by specialist- no acute findings meriting change in the plan         Stage 3a chronic kidney disease (HCC)   Orders:    Comprehensive Metabolic Panel; Future    CBC with Auto Differential; Future          Return in about 3 months (around 6/27/2025) for results review.     Subjective   Loli Bazan is a 69 y.o. female with medical history below is seen for medicare wellness.    Past Medical History:   Diagnosis Date    Anxiety disorder     Breast cancer (HCC)     right lumpectomy    Concussion     Depression     Fall 04/2021    Headache     Ill-defined condition     concussion March 2021 post fall    Ill-defined condition     migraine HA     Murmur     Psychotic disorder (HCC)     Radiation therapy complication

## 2025-03-27 NOTE — PATIENT INSTRUCTIONS
also includes taking medicines as directed, managing other health conditions, and trying to get a healthy amount of sleep.  Follow-up care is a key part of your treatment and safety. Be sure to make and go to all appointments, and call your doctor if you are having problems. It's also a good idea to know your test results and keep a list of the medicines you take.  How can you care for yourself at home?  Diet    Use less salt when you cook and eat. This helps lower your blood pressure. Taste food before salting. Add only a little salt when you think you need it. With time, your taste buds will adjust to less salt.     Eat fewer snack items, fast foods, canned soups, and other high-salt, high-fat, processed foods.     Read food labels and try to avoid saturated and trans fats. They increase your risk of heart disease by raising cholesterol levels.     Limit the amount of solid fat--butter, margarine, and shortening--you eat. Use olive, peanut, or canola oil when you cook. Bake, broil, and steam foods instead of frying them.     Eat a variety of fruit and vegetables every day. Dark green, deep orange, red, or yellow fruits and vegetables are especially good for you. Examples include spinach, carrots, peaches, and berries.     Foods high in fiber can reduce your cholesterol and provide important vitamins and minerals. High-fiber foods include whole-grain cereals and breads, oatmeal, beans, brown rice, citrus fruits, and apples.     Eat lean proteins. Heart-healthy proteins include seafood, lean meats and poultry, eggs, beans, peas, nuts, seeds, and soy products.     Limit drinks and foods with added sugar. These include candy, desserts, and soda pop.   Heart-healthy lifestyle    If your doctor recommends it, get more exercise. For many people, walking is a good choice. Or you may want to swim, bike, or do other activities. Bit by bit, increase the time you're active every day. Try for at least 30 minutes on most days

## 2025-03-27 NOTE — PROGRESS NOTES
Chief Complaint   Patient presents with    Medicare AWV    Fatigue     Also hot all the time    Fall     About 8 mo ago       \"Have you been to the ER, urgent care clinic since your last visit?  Hospitalized since your last visit?\"    Yes    “Have you seen or consulted any other health care providers outside of [facility] since your last visit?”    yes        “Have you had a colorectal cancer screening such as a colonoscopy/FIT/Cologuard?    yes    No colonoscopy on file  Date of last Cologuard: 4/3/2021  No FIT/FOBT on file   No flexible sigmoidoscopy on file         Click Here for Release of Records Request       There were no vitals filed for this visit.   Health Maintenance Due   Topic Date Due    DTaP/Tdap/Td vaccine (1 - Tdap) Never done    Shingles vaccine (1 of 2) Never done    Respiratory Syncytial Virus (RSV) Pregnant or age 60 yrs+ (1 - Risk 60-74 years 1-dose series) Never done    Colorectal Cancer Screen  2024    Flu vaccine (1) 2024    COVID-19 Vaccine (3 - - season) 2024    Annual Wellness Visit (Medicare Advantage)  2025        The patient, Loli Bazan, identity was verified by name and .

## 2025-03-28 LAB
25(OH)D3 SERPL-MCNC: 36.7 NG/ML (ref 30–100)
ALBUMIN SERPL-MCNC: 4.3 G/DL (ref 3.5–5)
ALBUMIN/GLOB SERPL: 1.7 (ref 1.1–2.2)
ALP SERPL-CCNC: 102 U/L (ref 45–117)
ALT SERPL-CCNC: 21 U/L (ref 12–78)
ANION GAP SERPL CALC-SCNC: 5 MMOL/L (ref 2–12)
APPEARANCE UR: ABNORMAL
AST SERPL-CCNC: 16 U/L (ref 15–37)
BACTERIA URNS QL MICRO: NEGATIVE /HPF
BASOPHILS # BLD: 0.04 K/UL (ref 0–0.1)
BASOPHILS NFR BLD: 0.6 % (ref 0–1)
BILIRUB SERPL-MCNC: 0.3 MG/DL (ref 0.2–1)
BILIRUB UR QL: NEGATIVE
BUN SERPL-MCNC: 22 MG/DL (ref 6–20)
BUN/CREAT SERPL: 25 (ref 12–20)
CALCIUM SERPL-MCNC: 9.8 MG/DL (ref 8.5–10.1)
CHLORIDE SERPL-SCNC: 108 MMOL/L (ref 97–108)
CHOLEST SERPL-MCNC: 193 MG/DL
CO2 SERPL-SCNC: 27 MMOL/L (ref 21–32)
COLOR UR: ABNORMAL
CREAT SERPL-MCNC: 0.89 MG/DL (ref 0.55–1.02)
DIFFERENTIAL METHOD BLD: NORMAL
EOSINOPHIL # BLD: 0.1 K/UL (ref 0–0.4)
EOSINOPHIL NFR BLD: 1.6 % (ref 0–7)
EPITH CASTS URNS QL MICRO: ABNORMAL /LPF
ERYTHROCYTE [DISTWIDTH] IN BLOOD BY AUTOMATED COUNT: 12.8 % (ref 11.5–14.5)
EST. AVERAGE GLUCOSE BLD GHB EST-MCNC: 97 MG/DL
GLOBULIN SER CALC-MCNC: 2.6 G/DL (ref 2–4)
GLUCOSE SERPL-MCNC: 85 MG/DL (ref 65–100)
GLUCOSE UR STRIP.AUTO-MCNC: NEGATIVE MG/DL
HBA1C MFR BLD: 5 % (ref 4–5.6)
HCT VFR BLD AUTO: 41.2 % (ref 35–47)
HDLC SERPL-MCNC: 72 MG/DL
HDLC SERPL: 2.7 (ref 0–5)
HGB BLD-MCNC: 13 G/DL (ref 11.5–16)
HGB UR QL STRIP: NEGATIVE
HYALINE CASTS URNS QL MICRO: ABNORMAL /LPF (ref 0–5)
IMM GRANULOCYTES # BLD AUTO: 0.01 K/UL (ref 0–0.04)
IMM GRANULOCYTES NFR BLD AUTO: 0.2 % (ref 0–0.5)
KETONES UR QL STRIP.AUTO: NEGATIVE MG/DL
LDLC SERPL CALC-MCNC: 83.6 MG/DL (ref 0–100)
LEUKOCYTE ESTERASE UR QL STRIP.AUTO: ABNORMAL
LYMPHOCYTES # BLD: 2.48 K/UL (ref 0.8–3.5)
LYMPHOCYTES NFR BLD: 39.6 % (ref 12–49)
MCH RBC QN AUTO: 31 PG (ref 26–34)
MCHC RBC AUTO-ENTMCNC: 31.6 G/DL (ref 30–36.5)
MCV RBC AUTO: 98.3 FL (ref 80–99)
MONOCYTES # BLD: 0.7 K/UL (ref 0–1)
MONOCYTES NFR BLD: 11.2 % (ref 5–13)
NEUTS SEG # BLD: 2.94 K/UL (ref 1.8–8)
NEUTS SEG NFR BLD: 46.8 % (ref 32–75)
NITRITE UR QL STRIP.AUTO: NEGATIVE
NRBC # BLD: 0 K/UL (ref 0–0.01)
NRBC BLD-RTO: 0 PER 100 WBC
PH UR STRIP: 6 (ref 5–8)
PLATELET # BLD AUTO: 263 K/UL (ref 150–400)
PMV BLD AUTO: 11.2 FL (ref 8.9–12.9)
POTASSIUM SERPL-SCNC: 3.9 MMOL/L (ref 3.5–5.1)
PROT SERPL-MCNC: 6.9 G/DL (ref 6.4–8.2)
PROT UR STRIP-MCNC: NEGATIVE MG/DL
RBC # BLD AUTO: 4.19 M/UL (ref 3.8–5.2)
RBC #/AREA URNS HPF: ABNORMAL /HPF (ref 0–5)
SODIUM SERPL-SCNC: 140 MMOL/L (ref 136–145)
SP GR UR REFRACTOMETRY: 1.02 (ref 1–1.03)
TRIGL SERPL-MCNC: 187 MG/DL
TSH SERPL DL<=0.05 MIU/L-ACNC: 0.45 UIU/ML (ref 0.36–3.74)
URINE CULTURE IF INDICATED: ABNORMAL
UROBILINOGEN UR QL STRIP.AUTO: 0.2 EU/DL (ref 0.2–1)
VLDLC SERPL CALC-MCNC: 37.4 MG/DL
WBC # BLD AUTO: 6.3 K/UL (ref 3.6–11)
WBC URNS QL MICRO: ABNORMAL /HPF (ref 0–4)

## 2025-04-10 ENCOUNTER — OFFICE VISIT (OUTPATIENT)
Age: 70
End: 2025-04-10
Payer: MEDICARE

## 2025-04-10 VITALS
RESPIRATION RATE: 18 BRPM | HEART RATE: 68 BPM | OXYGEN SATURATION: 97 % | DIASTOLIC BLOOD PRESSURE: 76 MMHG | BODY MASS INDEX: 25.91 KG/M2 | SYSTOLIC BLOOD PRESSURE: 120 MMHG | WEIGHT: 171 LBS | HEIGHT: 68 IN

## 2025-04-10 DIAGNOSIS — G43.719 CHRONIC MIGRAINE WITHOUT AURA, INTRACTABLE, WITHOUT STATUS MIGRAINOSUS: Primary | ICD-10-CM

## 2025-04-10 DIAGNOSIS — M50.30 DDD (DEGENERATIVE DISC DISEASE), CERVICAL: ICD-10-CM

## 2025-04-10 PROCEDURE — 1125F AMNT PAIN NOTED PAIN PRSNT: CPT | Performed by: NURSE PRACTITIONER

## 2025-04-10 PROCEDURE — G8399 PT W/DXA RESULTS DOCUMENT: HCPCS | Performed by: NURSE PRACTITIONER

## 2025-04-10 PROCEDURE — 1090F PRES/ABSN URINE INCON ASSESS: CPT | Performed by: NURSE PRACTITIONER

## 2025-04-10 PROCEDURE — 1160F RVW MEDS BY RX/DR IN RCRD: CPT | Performed by: NURSE PRACTITIONER

## 2025-04-10 PROCEDURE — 99214 OFFICE O/P EST MOD 30 MIN: CPT | Performed by: NURSE PRACTITIONER

## 2025-04-10 PROCEDURE — 1123F ACP DISCUSS/DSCN MKR DOCD: CPT | Performed by: NURSE PRACTITIONER

## 2025-04-10 PROCEDURE — 3017F COLORECTAL CA SCREEN DOC REV: CPT | Performed by: NURSE PRACTITIONER

## 2025-04-10 PROCEDURE — G8427 DOCREV CUR MEDS BY ELIG CLIN: HCPCS | Performed by: NURSE PRACTITIONER

## 2025-04-10 PROCEDURE — G8419 CALC BMI OUT NRM PARAM NOF/U: HCPCS | Performed by: NURSE PRACTITIONER

## 2025-04-10 PROCEDURE — 1159F MED LIST DOCD IN RCRD: CPT | Performed by: NURSE PRACTITIONER

## 2025-04-10 PROCEDURE — 1036F TOBACCO NON-USER: CPT | Performed by: NURSE PRACTITIONER

## 2025-04-10 RX ORDER — BUTALBITAL, ASPIRIN, AND CAFFEINE 325; 50; 40 MG/1; MG/1; MG/1
CAPSULE ORAL
Qty: 10 CAPSULE | Refills: 5 | Status: SHIPPED | OUTPATIENT
Start: 2025-04-10 | End: 2025-05-08

## 2025-04-10 ASSESSMENT — PATIENT HEALTH QUESTIONNAIRE - PHQ9
1. LITTLE INTEREST OR PLEASURE IN DOING THINGS: NOT AT ALL
SUM OF ALL RESPONSES TO PHQ QUESTIONS 1-9: 0
SUM OF ALL RESPONSES TO PHQ QUESTIONS 1-9: 0
2. FEELING DOWN, DEPRESSED OR HOPELESS: NOT AT ALL
SUM OF ALL RESPONSES TO PHQ QUESTIONS 1-9: 0
SUM OF ALL RESPONSES TO PHQ QUESTIONS 1-9: 0

## 2025-04-10 ASSESSMENT — ENCOUNTER SYMPTOMS
VOMITING: 0
NAUSEA: 1

## 2025-04-10 NOTE — PROGRESS NOTES
Chief Complaint   Patient presents with    Migraine     Follow up - coming and going - if takes med right away she is ok     1. Have you been to the ER, urgent care clinic since your last visit?  Hospitalized since your last visit? No     2. Have you seen or consulted any other health care providers outside of the [facility] since your last visit?  Include any pap smears or colon screening.  No

## 2025-04-10 NOTE — PROGRESS NOTES
[facility]  [address]  [address]  [place] [address]  Tel: [phone]  Fax: [phone]      Date:  04/10/25     Name:  JACKSON URIBE  :  1955  MRN:  [id number]     PCP:  Wilmer Arce MD    Chief Complaint   Patient presents with    Migraine     Follow up - coming and going - if takes med right away she is ok       HISTORY OF PRESENT ILLNESS:  History of Present Illness  The patient is a 69-year-old female who presents today for a regular follow-up. She was last seen in the office in 2024, at which time she received Botox for chronic migraine.    Migraines persist, with an average of 10 headache days per month. Life is reported to be very busy and stressful. Botox treatment had not been administered for 7 months due to insurance issues, and its current efficacy is uncertain.   Nurtec was tried in the past but was found to be too expensive.  Migraine headache Episodes are managed with Fiorinal, ice, rest, and darkness. Stress and damp weather are identified as potential triggers. Symptoms are exacerbated by light and sound, and occasional nausea is experienced, although without vomiting. Accompanying symptoms include lightheadedness and dizziness.    Persistent neck pain is reported despite previous interventions including surgery, epidurals, cortisone injections, and ablation. A consultation is scheduled with Dr. Oneal next week, but there is reluctance to undergo further surgery. Falls have occurred, including one today as she notes that her legs give out. Physical therapy has been tried, and neck exercises are performed at home. Residual tingling from a past neck surgery is noted. Gabapentin was previously taken but discontinued due to side effects.    Satisfactory sleep quality is reported, aided by trazodone and ramelteon, but severe insomnia occurs without these medications.       PAST SURGICAL HISTORY: Neck surgery    SOCIAL

## 2025-04-12 DIAGNOSIS — E78.2 MIXED HYPERLIPIDEMIA: ICD-10-CM

## 2025-04-14 RX ORDER — ROSUVASTATIN CALCIUM 10 MG/1
10 TABLET, COATED ORAL NIGHTLY
Qty: 30 TABLET | Refills: 0 | Status: SHIPPED | OUTPATIENT
Start: 2025-04-14

## 2025-05-12 ENCOUNTER — TELEPHONE (OUTPATIENT)
Age: 70
End: 2025-05-12

## 2025-05-12 NOTE — TELEPHONE ENCOUNTER
HIPAA Verified (if caller is someone other than patient): N/A       Reason for Call:   Medication refill    Medication Name:     butalbital-aspirin-caffeine (FIORINAL) -40 MG capsule     Pharmacy (if different from pharmacy on file):   [facility] [address] [place] [address] RD - P [phone] - F [phone]       Prescribing Provider:   Andreia    Last Office Visit (must be within last 12 months - if not, schedule appointment then send refill encounter):   4/10/2025     Is patient completely out of medication?   yes        Level 1 Calls - attempted to reach practice? N/A     Reason Call Marked High Priority (if applicable):   Pt out of medication

## 2025-05-13 NOTE — TELEPHONE ENCOUNTER
Verified patient with 2 identifiers   Advised that Trisha sent in a refill for Fiorinal on 4/10/25 # 10 with 5 refills. Patient verified understanding and will contact [facility]

## 2025-05-28 DIAGNOSIS — E78.2 MIXED HYPERLIPIDEMIA: ICD-10-CM

## 2025-05-28 RX ORDER — ROSUVASTATIN CALCIUM 10 MG/1
10 TABLET, COATED ORAL NIGHTLY
Qty: 30 TABLET | Refills: 2 | Status: SHIPPED | OUTPATIENT
Start: 2025-05-28

## 2025-06-02 ENCOUNTER — OFFICE VISIT (OUTPATIENT)
Age: 70
End: 2025-06-02
Payer: MEDICARE

## 2025-06-02 ENCOUNTER — TELEPHONE (OUTPATIENT)
Age: 70
End: 2025-06-02

## 2025-06-02 VITALS
HEART RATE: 86 BPM | WEIGHT: 171.8 LBS | SYSTOLIC BLOOD PRESSURE: 124 MMHG | HEIGHT: 68 IN | DIASTOLIC BLOOD PRESSURE: 70 MMHG | BODY MASS INDEX: 26.04 KG/M2

## 2025-06-02 DIAGNOSIS — E55.9 VITAMIN D DEFICIENCY, UNSPECIFIED: ICD-10-CM

## 2025-06-02 DIAGNOSIS — E03.9 ACQUIRED HYPOTHYROIDISM: Primary | ICD-10-CM

## 2025-06-02 PROCEDURE — 1160F RVW MEDS BY RX/DR IN RCRD: CPT | Performed by: INTERNAL MEDICINE

## 2025-06-02 PROCEDURE — G8427 DOCREV CUR MEDS BY ELIG CLIN: HCPCS | Performed by: INTERNAL MEDICINE

## 2025-06-02 PROCEDURE — 1159F MED LIST DOCD IN RCRD: CPT | Performed by: INTERNAL MEDICINE

## 2025-06-02 PROCEDURE — 1123F ACP DISCUSS/DSCN MKR DOCD: CPT | Performed by: INTERNAL MEDICINE

## 2025-06-02 PROCEDURE — 1036F TOBACCO NON-USER: CPT | Performed by: INTERNAL MEDICINE

## 2025-06-02 PROCEDURE — G2211 COMPLEX E/M VISIT ADD ON: HCPCS | Performed by: INTERNAL MEDICINE

## 2025-06-02 PROCEDURE — 1090F PRES/ABSN URINE INCON ASSESS: CPT | Performed by: INTERNAL MEDICINE

## 2025-06-02 PROCEDURE — G8419 CALC BMI OUT NRM PARAM NOF/U: HCPCS | Performed by: INTERNAL MEDICINE

## 2025-06-02 PROCEDURE — 1125F AMNT PAIN NOTED PAIN PRSNT: CPT | Performed by: INTERNAL MEDICINE

## 2025-06-02 PROCEDURE — G8399 PT W/DXA RESULTS DOCUMENT: HCPCS | Performed by: INTERNAL MEDICINE

## 2025-06-02 PROCEDURE — 99214 OFFICE O/P EST MOD 30 MIN: CPT | Performed by: INTERNAL MEDICINE

## 2025-06-02 PROCEDURE — 3017F COLORECTAL CA SCREEN DOC REV: CPT | Performed by: INTERNAL MEDICINE

## 2025-06-02 RX ORDER — ATOMOXETINE 60 MG/1
60 CAPSULE ORAL DAILY
COMMUNITY
Start: 2025-05-19

## 2025-06-02 NOTE — TELEPHONE ENCOUNTER
Spoke with pt, she is requesting more Butalbital, she said that taking them every 6 hours and there only being a quantity of 10 she will run out. Please call with any questions.        [phone]

## 2025-06-02 NOTE — PROGRESS NOTES
Chief Complaint   Patient presents with    Thyroid Problem     Pcp and pharmacy verified     History of Present Illness: Loli Bazan is a 70 y.o. female here for follow up of hypothyroidism.  \"I had been taking lithium in the past, for bipolar d/0 and it killed my thyroid.\"  At our initial visit in April 2021 her TSH was 7.5 so I increased her Levothyroxine to 150mcg every day. Pt instructed to take her LT4 by itself, on an empty stomach with a full glass of water and wait 30 minutes before she takes any other medications or eats.    \"I am having spinal fusion surgery next week. I had it done 2 years ago and now they are doing to different vertebrae.\"    Pt denies any recent illnesses, injuries or hospitalizations.    Pt is taking the Levothyroxine 150mcg every day. She takes this first thing in the morning, on an empty stomach, with a full glass of water by itself and waits 30-60 minutes before she eats.    She is not currently taking any Vitamin D.  She is taking vitamin D 5,000 units \"when I think about it.\"    She is still  seeing Dr. Tex Leach of Neurology.    She denies issues of palpitations, or CP, SOB, tremors, heat or cold intolerance, diarrhea or constipation.  She denies issues of dysphagia, dysphonia or chocking issues.    Since I have not been as physically active I have gained weight and I need labs drawn.  Her weight today was down from 181 pounds in November 2024 to 171 pounds. (Her weight in March 2023 was 210 pounds).      Current Outpatient Medications   Medication Sig    atomoxetine (STRATTERA) 60 MG capsule Take 1 capsule by mouth daily    rosuvastatin (CRESTOR) 10 MG tablet Take 1 tablet by mouth nightly    butalbital-aspirin-caffeine (FIORINAL) -40 MG capsule TAKE 1 CAPSULE BY MOUTH EVERY 6 HOURS AS NEEDED FOR MIGRAINE HEADACHE    brimonidine (ALPHAGAN) 0.2 % ophthalmic solution     phentermine (ADIPEX-P) 37.5 MG tablet TAKE 1 TABLET BY MOUTH ONCE DAILY FOR 30 DAYS    baclofen

## 2025-06-02 NOTE — TELEPHONE ENCOUNTER
Verified patient with 2 identifiers   Patient states she gets 7-8 migraines per month. States it take 3 Fiorinal to get rid of. States otherwise it will last days. Requesting more that 10 caps per month. Please advise

## 2025-06-03 DIAGNOSIS — G43.719 CHRONIC MIGRAINE WITHOUT AURA, INTRACTABLE, WITHOUT STATUS MIGRAINOSUS: Primary | ICD-10-CM

## 2025-06-03 RX ORDER — BUTALBITAL, ASPIRIN, AND CAFFEINE 325; 50; 40 MG/1; MG/1; MG/1
CAPSULE ORAL
Qty: 20 CAPSULE | Refills: 3 | Status: SHIPPED | OUTPATIENT
Start: 2025-06-03 | End: 2025-07-01

## 2025-06-03 NOTE — TELEPHONE ENCOUNTER
Verified patient with 2 identifiers  Advised per Trisha Boswell NP: I have modifed her Rx, but the 20 tablets must last 30 days. Patient verified understanding and was very appreciative.

## 2025-06-03 NOTE — TELEPHONE ENCOUNTER
Patient does not take 3 together - she takes 1 tab every 6 hours when she gets a migraine - 3 in a day when she gets one

## 2025-06-04 ENCOUNTER — OFFICE VISIT (OUTPATIENT)
Age: 70
End: 2025-06-04
Payer: MEDICARE

## 2025-06-04 VITALS
SYSTOLIC BLOOD PRESSURE: 105 MMHG | HEIGHT: 68 IN | RESPIRATION RATE: 16 BRPM | BODY MASS INDEX: 25.7 KG/M2 | WEIGHT: 169.6 LBS | DIASTOLIC BLOOD PRESSURE: 68 MMHG | OXYGEN SATURATION: 96 % | HEART RATE: 72 BPM | TEMPERATURE: 96.8 F

## 2025-06-04 DIAGNOSIS — Z01.818 PRE-OP EVALUATION: Primary | ICD-10-CM

## 2025-06-04 DIAGNOSIS — R06.02 SOB (SHORTNESS OF BREATH): ICD-10-CM

## 2025-06-04 DIAGNOSIS — I10 HYPERTENSION, WELL CONTROLLED: ICD-10-CM

## 2025-06-04 DIAGNOSIS — E03.9 ACQUIRED HYPOTHYROIDISM: ICD-10-CM

## 2025-06-04 PROCEDURE — 1090F PRES/ABSN URINE INCON ASSESS: CPT | Performed by: INTERNAL MEDICINE

## 2025-06-04 PROCEDURE — G8427 DOCREV CUR MEDS BY ELIG CLIN: HCPCS | Performed by: INTERNAL MEDICINE

## 2025-06-04 PROCEDURE — 1159F MED LIST DOCD IN RCRD: CPT | Performed by: INTERNAL MEDICINE

## 2025-06-04 PROCEDURE — 3074F SYST BP LT 130 MM HG: CPT | Performed by: INTERNAL MEDICINE

## 2025-06-04 PROCEDURE — 1125F AMNT PAIN NOTED PAIN PRSNT: CPT | Performed by: INTERNAL MEDICINE

## 2025-06-04 PROCEDURE — 1160F RVW MEDS BY RX/DR IN RCRD: CPT | Performed by: INTERNAL MEDICINE

## 2025-06-04 PROCEDURE — 99214 OFFICE O/P EST MOD 30 MIN: CPT | Performed by: INTERNAL MEDICINE

## 2025-06-04 PROCEDURE — 1036F TOBACCO NON-USER: CPT | Performed by: INTERNAL MEDICINE

## 2025-06-04 PROCEDURE — 1123F ACP DISCUSS/DSCN MKR DOCD: CPT | Performed by: INTERNAL MEDICINE

## 2025-06-04 PROCEDURE — G8399 PT W/DXA RESULTS DOCUMENT: HCPCS | Performed by: INTERNAL MEDICINE

## 2025-06-04 PROCEDURE — 3078F DIAST BP <80 MM HG: CPT | Performed by: INTERNAL MEDICINE

## 2025-06-04 PROCEDURE — G8419 CALC BMI OUT NRM PARAM NOF/U: HCPCS | Performed by: INTERNAL MEDICINE

## 2025-06-04 PROCEDURE — 3017F COLORECTAL CA SCREEN DOC REV: CPT | Performed by: INTERNAL MEDICINE

## 2025-06-04 RX ORDER — ALBUTEROL SULFATE 90 UG/1
2 INHALANT RESPIRATORY (INHALATION) EVERY 6 HOURS PRN
Qty: 18 G | Refills: 1 | Status: SHIPPED | OUTPATIENT
Start: 2025-06-04

## 2025-06-04 ASSESSMENT — PATIENT HEALTH QUESTIONNAIRE - PHQ9
SUM OF ALL RESPONSES TO PHQ QUESTIONS 1-9: 0
SUM OF ALL RESPONSES TO PHQ QUESTIONS 1-9: 0
1. LITTLE INTEREST OR PLEASURE IN DOING THINGS: NOT AT ALL
2. FEELING DOWN, DEPRESSED OR HOPELESS: NOT AT ALL
SUM OF ALL RESPONSES TO PHQ QUESTIONS 1-9: 0
SUM OF ALL RESPONSES TO PHQ QUESTIONS 1-9: 0

## 2025-06-04 NOTE — PROGRESS NOTES
Chief Complaint   Patient presents with    Pre-op Exam     HPI:  Loli Bazan is a 70 y.o.  female with significant medical history below including hypothyroidism, anxiety and depression presents for pre-operative evaluation. Patient has a cervical infusion procedure scheduled with Dr. Oneal at [facility] on 6/9/2025.      Blood pressure reading is at goal. Most recent lab showed good control of hypothyroidism. Depression and anxiety is stable on multiple medications.   Patient follows a specialist for psychiatry diagnoses.    Results of pre-op labs and EKG done at [facility] have been reviewed. Twelve lead EKG showed normal sinus rhythm. Complete blood count is within normal limits. Basic Metabolic Panel is stable except for slightly elevated serum calcium and glucose levels.     Review of Systems  As per hpi    Past Medical History:   Diagnosis Date    Anxiety disorder     Breast cancer (HCC)     right lumpectomy    Concussion     Depression     Fall 04/2021    Headache     Ill-defined condition     concussion March 2021 post fall    Ill-defined condition     migraine HA     Murmur     Psychotic disorder (HCC)     Radiation therapy complication     late 2011 or early 2012 for radiation    Thyroid disease     2005 thyroid no longer working post lithium med     Past Surgical History:   Procedure Laterality Date    ANKLE FRACTURE SURGERY      left surgical repair 2008    BREAST BIOPSY Left     benign 2011    BREAST LUMPECTOMY Right     2011    HEENT      tonsillectomy    HYSTERECTOMY (CERVIX STATUS UNKNOWN)      ORTHOPEDIC SURGERY       left meniscus repair x 2    OTHER SURGICAL HISTORY      barthalomew cyst rupture repair    ROTATOR CUFF REPAIR      right 2015     Social History     Socioeconomic History    Marital status:      Spouse name: None    Number of children: None    Years of education: None    Highest education level: None   Tobacco Use    Smoking status:

## 2025-06-04 NOTE — PROGRESS NOTES
Chief Complaint   Patient presents with    Pre-op Exam       \"Have you been to the ER, urgent care clinic since your last visit?  Hospitalized since your last visit?\"    YES - When: approximately 1 months ago.  Where and Why: ER.    “Have you seen or consulted any other health care providers outside of [facility] since your last visit?”    YES - When: approximately 1 months ago.  Where and Why: Ortho.        “Have you had a colorectal cancer screening such as a colonoscopy/FIT/Cologuard?    NO    No colonoscopy on file  Date of last Cologuard: 4/3/2021  No FIT/FOBT on file   No flexible sigmoidoscopy on file         Click Here for Release of Records Request       There were no vitals filed for this visit.   Health Maintenance Due   Topic Date Due    DTaP/Tdap/Td vaccine (1 - Tdap) Never done    Shingles vaccine (1 of 2) Never done    Respiratory Syncytial Virus (RSV) Pregnant or age 60 yrs+ (1 - Risk 60-74 years 1-dose series) Never done    Colorectal Cancer Screen  2024    COVID-19 Vaccine (3 - 2024- season) 2024        The patient, Loli Bazan, identity was verified by name and .

## 2025-06-16 RX ORDER — LEVOTHYROXINE SODIUM 150 UG/1
TABLET ORAL
Qty: 90 TABLET | Refills: 1 | Status: SHIPPED | OUTPATIENT
Start: 2025-06-16

## 2025-06-24 ENCOUNTER — COMMUNITY OUTREACH (OUTPATIENT)
Age: 70
End: 2025-06-24

## 2025-08-24 DIAGNOSIS — E78.2 MIXED HYPERLIPIDEMIA: ICD-10-CM

## 2025-08-25 RX ORDER — ROSUVASTATIN CALCIUM 10 MG/1
10 TABLET, COATED ORAL NIGHTLY
Qty: 90 TABLET | Refills: 1 | Status: SHIPPED | OUTPATIENT
Start: 2025-08-25